# Patient Record
Sex: FEMALE | Race: WHITE | NOT HISPANIC OR LATINO | Employment: OTHER | URBAN - METROPOLITAN AREA
[De-identification: names, ages, dates, MRNs, and addresses within clinical notes are randomized per-mention and may not be internally consistent; named-entity substitution may affect disease eponyms.]

---

## 2017-01-31 ENCOUNTER — ALLSCRIPTS OFFICE VISIT (OUTPATIENT)
Dept: OTHER | Facility: OTHER | Age: 67
End: 2017-01-31

## 2017-01-31 ENCOUNTER — LAB REQUISITION (OUTPATIENT)
Dept: LAB | Facility: HOSPITAL | Age: 67
End: 2017-01-31
Payer: COMMERCIAL

## 2017-01-31 ENCOUNTER — GENERIC CONVERSION - ENCOUNTER (OUTPATIENT)
Dept: OTHER | Facility: OTHER | Age: 67
End: 2017-01-31

## 2017-01-31 ENCOUNTER — TRANSCRIBE ORDERS (OUTPATIENT)
Dept: ADMINISTRATIVE | Facility: HOSPITAL | Age: 67
End: 2017-01-31

## 2017-01-31 DIAGNOSIS — M25.512 PAIN IN LEFT SHOULDER: ICD-10-CM

## 2017-01-31 DIAGNOSIS — R31.29 OTHER MICROSCOPIC HEMATURIA: ICD-10-CM

## 2017-01-31 DIAGNOSIS — R10.32 ABDOMINAL PAIN, LEFT LOWER QUADRANT: Primary | ICD-10-CM

## 2017-01-31 DIAGNOSIS — R10.32 LEFT LOWER QUADRANT PAIN: ICD-10-CM

## 2017-01-31 DIAGNOSIS — Z12.31 ENCOUNTER FOR SCREENING MAMMOGRAM FOR MALIGNANT NEOPLASM OF BREAST: ICD-10-CM

## 2017-01-31 DIAGNOSIS — R31.29 MICROSCOPIC HEMATURIA: ICD-10-CM

## 2017-01-31 DIAGNOSIS — R63.5 ABNORMAL WEIGHT GAIN: ICD-10-CM

## 2017-01-31 DIAGNOSIS — E04.1 NONTOXIC SINGLE THYROID NODULE: ICD-10-CM

## 2017-01-31 DIAGNOSIS — M75.82 OTHER SHOULDER LESIONS, LEFT SHOULDER: ICD-10-CM

## 2017-01-31 DIAGNOSIS — Z12.11 ENCOUNTER FOR SCREENING FOR MALIGNANT NEOPLASM OF COLON: ICD-10-CM

## 2017-01-31 DIAGNOSIS — Z78.0 ASYMPTOMATIC MENOPAUSAL STATE: ICD-10-CM

## 2017-01-31 LAB
BILIRUB UR QL STRIP: NEGATIVE
CLARITY UR: NORMAL
COLOR UR: CLEAR
GLUCOSE (HISTORICAL): NEGATIVE
HGB UR QL STRIP.AUTO: NORMAL
KETONES UR STRIP-MCNC: NEGATIVE MG/DL
LEUKOCYTE ESTERASE UR QL STRIP: NEGATIVE
NITRITE UR QL STRIP: NEGATIVE
PH UR STRIP.AUTO: 5 [PH]
PROT UR STRIP-MCNC: NEGATIVE MG/DL
SP GR UR STRIP.AUTO: 1
UROBILINOGEN UR QL STRIP.AUTO: 0.2

## 2017-01-31 PROCEDURE — 87086 URINE CULTURE/COLONY COUNT: CPT | Performed by: PHYSICIAN ASSISTANT

## 2017-02-01 LAB — BACTERIA UR CULT: NORMAL

## 2017-02-03 ENCOUNTER — GENERIC CONVERSION - ENCOUNTER (OUTPATIENT)
Dept: OTHER | Facility: OTHER | Age: 67
End: 2017-02-03

## 2017-02-07 ENCOUNTER — HOSPITAL ENCOUNTER (OUTPATIENT)
Dept: CT IMAGING | Facility: HOSPITAL | Age: 67
Discharge: HOME | End: 2017-02-07
Payer: COMMERCIAL

## 2017-02-07 DIAGNOSIS — R31.29 OTHER MICROSCOPIC HEMATURIA: ICD-10-CM

## 2017-02-07 DIAGNOSIS — R10.32 LEFT LOWER QUADRANT PAIN: ICD-10-CM

## 2017-02-07 PROCEDURE — 74178 CT ABD&PLV WO CNTR FLWD CNTR: CPT

## 2017-02-07 RX ADMIN — IOHEXOL 100 ML: 350 INJECTION, SOLUTION INTRAVENOUS at 17:03

## 2017-02-09 ENCOUNTER — APPOINTMENT (OUTPATIENT)
Dept: PHYSICAL THERAPY | Facility: MEDICAL CENTER | Age: 67
End: 2017-02-09
Payer: COMMERCIAL

## 2017-02-10 ENCOUNTER — GENERIC CONVERSION - ENCOUNTER (OUTPATIENT)
Dept: OTHER | Facility: OTHER | Age: 67
End: 2017-02-10

## 2017-02-15 ENCOUNTER — APPOINTMENT (OUTPATIENT)
Dept: PHYSICAL THERAPY | Facility: MEDICAL CENTER | Age: 67
End: 2017-02-15
Payer: COMMERCIAL

## 2017-02-15 ENCOUNTER — GENERIC CONVERSION - ENCOUNTER (OUTPATIENT)
Dept: OTHER | Facility: OTHER | Age: 67
End: 2017-02-15

## 2017-02-15 PROCEDURE — G8991 OTHER PT/OT GOAL STATUS: HCPCS

## 2017-02-15 PROCEDURE — 97161 PT EVAL LOW COMPLEX 20 MIN: CPT

## 2017-02-15 PROCEDURE — G8990 OTHER PT/OT CURRENT STATUS: HCPCS

## 2017-02-21 ENCOUNTER — APPOINTMENT (OUTPATIENT)
Dept: PHYSICAL THERAPY | Facility: MEDICAL CENTER | Age: 67
End: 2017-02-21
Payer: COMMERCIAL

## 2017-02-21 PROCEDURE — 97110 THERAPEUTIC EXERCISES: CPT

## 2017-02-21 PROCEDURE — 97140 MANUAL THERAPY 1/> REGIONS: CPT

## 2017-02-24 ENCOUNTER — APPOINTMENT (OUTPATIENT)
Dept: PHYSICAL THERAPY | Facility: MEDICAL CENTER | Age: 67
End: 2017-02-24
Payer: COMMERCIAL

## 2017-02-27 ENCOUNTER — APPOINTMENT (OUTPATIENT)
Dept: PHYSICAL THERAPY | Facility: MEDICAL CENTER | Age: 67
End: 2017-02-27
Payer: COMMERCIAL

## 2017-02-28 ENCOUNTER — ALLSCRIPTS OFFICE VISIT (OUTPATIENT)
Dept: OTHER | Facility: OTHER | Age: 67
End: 2017-02-28

## 2017-03-01 ENCOUNTER — LAB CONVERSION - ENCOUNTER (OUTPATIENT)
Dept: OTHER | Facility: OTHER | Age: 67
End: 2017-03-01

## 2017-03-01 ENCOUNTER — HOSPITAL ENCOUNTER (OUTPATIENT)
Dept: BONE DENSITY | Facility: MEDICAL CENTER | Age: 67
Discharge: HOME | End: 2017-03-01
Payer: COMMERCIAL

## 2017-03-01 DIAGNOSIS — Z78.0 ASYMPTOMATIC MENOPAUSAL STATE: ICD-10-CM

## 2017-03-01 LAB — TSH SERPL DL<=0.05 MIU/L-ACNC: 1.36 MIU/L (ref 0.4–4.5)

## 2017-03-01 PROCEDURE — 77080 DXA BONE DENSITY AXIAL: CPT

## 2017-03-02 ENCOUNTER — GENERIC CONVERSION - ENCOUNTER (OUTPATIENT)
Dept: OTHER | Facility: OTHER | Age: 67
End: 2017-03-02

## 2017-03-02 ENCOUNTER — HOSPITAL ENCOUNTER (OUTPATIENT)
Dept: MAMMOGRAPHY | Facility: MEDICAL CENTER | Age: 67
Discharge: HOME | End: 2017-03-02
Payer: COMMERCIAL

## 2017-03-02 ENCOUNTER — APPOINTMENT (OUTPATIENT)
Dept: PHYSICAL THERAPY | Facility: MEDICAL CENTER | Age: 67
End: 2017-03-02
Payer: COMMERCIAL

## 2017-03-02 ENCOUNTER — APPOINTMENT (OUTPATIENT)
Dept: ULTRASOUND IMAGING | Facility: MEDICAL CENTER | Age: 67
End: 2017-03-02
Payer: COMMERCIAL

## 2017-03-02 DIAGNOSIS — Z12.31 ENCOUNTER FOR SCREENING MAMMOGRAM FOR MALIGNANT NEOPLASM OF BREAST: ICD-10-CM

## 2017-03-02 PROCEDURE — G0202 SCR MAMMO BI INCL CAD: HCPCS

## 2017-03-03 ENCOUNTER — GENERIC CONVERSION - ENCOUNTER (OUTPATIENT)
Dept: OTHER | Facility: OTHER | Age: 67
End: 2017-03-03

## 2017-03-03 ENCOUNTER — HOSPITAL ENCOUNTER (OUTPATIENT)
Dept: ULTRASOUND IMAGING | Facility: MEDICAL CENTER | Age: 67
Discharge: HOME | End: 2017-03-03
Payer: COMMERCIAL

## 2017-03-03 DIAGNOSIS — E04.1 NONTOXIC SINGLE THYROID NODULE: ICD-10-CM

## 2017-03-03 DIAGNOSIS — M81.0 AGE-RELATED OSTEOPOROSIS WITHOUT CURRENT PATHOLOGICAL FRACTURE: ICD-10-CM

## 2017-03-03 PROCEDURE — 76536 US EXAM OF HEAD AND NECK: CPT

## 2017-03-06 ENCOUNTER — GENERIC CONVERSION - ENCOUNTER (OUTPATIENT)
Dept: OTHER | Facility: OTHER | Age: 67
End: 2017-03-06

## 2017-03-07 ENCOUNTER — LAB CONVERSION - ENCOUNTER (OUTPATIENT)
Dept: OTHER | Facility: OTHER | Age: 67
End: 2017-03-07

## 2017-03-07 ENCOUNTER — GENERIC CONVERSION - ENCOUNTER (OUTPATIENT)
Dept: OTHER | Facility: OTHER | Age: 67
End: 2017-03-07

## 2017-03-07 LAB
25(OH)D3 SERPL-MCNC: 33 NG/ML (ref 30–100)
A/G RATIO (HISTORICAL): 1.5 (CALC) (ref 1–2.5)
ALBUMIN SERPL BCP-MCNC: 4 G/DL (ref 3.6–5.1)
ALP SERPL-CCNC: 89 U/L (ref 33–130)
ALT SERPL W P-5'-P-CCNC: 11 U/L (ref 6–29)
AST SERPL W P-5'-P-CCNC: 11 U/L (ref 10–35)
BILIRUB SERPL-MCNC: 0.3 MG/DL (ref 0.2–1.2)
BUN SERPL-MCNC: 16 MG/DL (ref 7–25)
BUN/CREA RATIO (HISTORICAL): NORMAL (CALC) (ref 6–22)
CALCIUM SERPL-MCNC: 9 MG/DL (ref 8.6–10.4)
CALCIUM SERPL-MCNC: 9 MG/DL (ref 8.6–10.4)
CHLORIDE SERPL-SCNC: 105 MMOL/L (ref 98–110)
CO2 SERPL-SCNC: 27 MMOL/L (ref 20–31)
CREAT SERPL-MCNC: 0.57 MG/DL (ref 0.5–0.99)
EGFR AFRICAN AMERICAN (HISTORICAL): 112 ML/MIN/1.73M2
EGFR-AMERICAN CALC (HISTORICAL): 97 ML/MIN/1.73M2
GAMMA GLOBULIN (HISTORICAL): 2.6 G/DL (CALC) (ref 1.9–3.7)
GLUCOSE (HISTORICAL): 98 MG/DL (ref 65–99)
POTASSIUM SERPL-SCNC: 4.9 MMOL/L (ref 3.5–5.3)
PTH-INTACT SERPL-MCNC: 60 PG/ML (ref 14–64)
SODIUM SERPL-SCNC: 137 MMOL/L (ref 135–146)
TOTAL PROTEIN (HISTORICAL): 6.6 G/DL (ref 6.1–8.1)

## 2017-03-09 ENCOUNTER — APPOINTMENT (OUTPATIENT)
Dept: PHYSICAL THERAPY | Facility: MEDICAL CENTER | Age: 67
End: 2017-03-09
Payer: COMMERCIAL

## 2017-03-09 PROCEDURE — 97110 THERAPEUTIC EXERCISES: CPT

## 2017-03-09 PROCEDURE — 97140 MANUAL THERAPY 1/> REGIONS: CPT

## 2017-03-16 ENCOUNTER — APPOINTMENT (OUTPATIENT)
Dept: PHYSICAL THERAPY | Facility: MEDICAL CENTER | Age: 67
End: 2017-03-16
Payer: COMMERCIAL

## 2017-03-16 PROCEDURE — 97110 THERAPEUTIC EXERCISES: CPT

## 2018-01-10 NOTE — RESULT NOTES
Verified Results  (Q) QUANTIFERON(R)-TB [facility] INCUBATED) [id number] 08:13AM Irena Newman   REPORT COMMENT:  SPLIT 08/30/2016 FROM [id number]  FASTING:YES     Test Name Result Flag Reference   QUANTIFERON(R)-TB GOLD,$(INCUBATED) NEGATIVE  NEGATIVE   Negative test result  M  tuberculosis complex   infection unlikely  NIL 0 05 IU/mL     MITOGEN-NIL 9 63 IU/mL     TB-NIL <0 00 IU/mL     The Nil tube value is used to determine if the patient  has a preexisting immune response which could cause a   false-positive reading on the test  In order for a   test to be valid, the Nil tube must have a value of   less than or equal to 8 0 IU/mL  The mitogen control tube is used to assure the patient   has a healthy immune status and also serves as a   control for correct blood handling and incubation  It   is used to detect false-negative readings  The mitogen   tube must have a gamma interferon value of greater   than or equal to 0 5 IU/mL higher than the value of   the Nil tube  The TB antigen tube is coated with the M  tuberculosis  specific antigens  For a test to be considered   positive, the TB antigen tube value minus the Nil tube   value must be greater than or equal to 0 35 IU/mL  For additional information, please refer to   http://education  [employer]/faq/QFT  (This link is being provided for informational/  educational purposes only )

## 2018-01-10 NOTE — RESULT NOTES
Verified Results  CT RENAL PROTOCOL [id number] 04:32PM William Peres   TW Order Number: [id number]    - Patient Instructions: To schedule this appointment, please contact Central Scheduling at [phone]  Test Name Result Flag Reference   CT RENAL PROTOCOL (Report)     CT ABDOMEN AND PELVIS WITH AND WITHOUT IV CONTRAST     INDICATION: Left lower quadrant pain and microhematuria  COMPARISON: November 3, 2015     TECHNIQUE: CT of the kidneys was performed without intravenous contrast  Dynamic postcontrast CT evaluation of the abdomen and pelvis was performed in both nephrographic and delayed phases after the administration of intravenous contrast  This    examination, like all CT scans performed in the [facility], was performed utilizing techniques to minimize radiation dose exposure, including the use of iterative reconstruction and automated exposure control  Axial, sagittal and    coronal reformatted images were submitted for interpretation  IV Contrast: iohexol (OMNIPAQUE) 350 MG/ML injection (MULTI-DOSE) 100 mL Note: (SINGLE DOSE/MULTI DOSE) information refers to the container from which the contrast was acquired  Contrast was injected one time intravenously without immediate    complication  Enteric Contrast: Enteric contrast was not administered  FINDINGS:     ABDOMEN     RIGHT KIDNEY AND URETER:   6 mm fat density mass triangular-shaped in the posterior midpole the right kidney is stable from prior studies likely angiomyolipoma    No detectable ureteral mass  No hydronephrosis or hydroureter  No urinary tract calculi  No perinephric collection  LEFT KIDNEY AND URETER:   No solid renal mass  No detectable ureteral mass  No hydronephrosis or hydroureter  No urinary tract calculi  No perinephric collection  URINARY BLADDER:   No bladder wall mass  No calculi  LUNG BASES: Unremarkable  LIVER/BILIARY TREE: Unremarkable       GALLBLADDER: No calcified gallstones  No pericholecystic inflammatory change  SPLEEN: Unremarkable  PANCREAS: Unremarkable  ADRENAL GLANDS: Unremarkable  STOMACH, BOWEL AND APPENDIX: Unremarkable  ABDOMINOPELVIC CAVITY: No ascites  No free intraperitoneal air  No lymphadenopathy  VESSELS: Unremarkable for patient's age  PELVIS     REPRODUCTIVE ORGANS: Unremarkable for patient's age  ABDOMINAL WALL/INGUINAL REGIONS: Unremarkable  OSSEOUS STRUCTURES: No acute fracture or destructive osseous lesion  IMPRESSION:     Normal examination  No CT abnormality identified to account for hematuria  6 mm angiomyolipoma posterior midpole cortex of the right kidney  This is stable dating back to November 20, 2010         Workstation performed: [id number]     Signed by:   Cathlean Primrose, DO   2/9/17

## 2018-01-11 NOTE — RESULT NOTES
Verified Results  (1) COMPREHENSIVE METABOLIC PANEL [id number] 00:36LF Tiana Aleksandar     Test Name Result Flag Reference   GLUCOSE 98 mg/dL  65-99   Fasting reference interval   UREA NITROGEN (BUN) 16 mg/dL  7-25   CREATININE 0 57 mg/dL  0 50-0 99   For patients >52years of age, the reference limit  for Creatinine is approximately 13% higher for people  identified as -American  eGFR NON-AFR   AMERICAN 97 mL/min/1 73m2  > OR = 60   eGFR AFRICAN AMERICAN 112 mL/min/1 73m2  > OR = 60   BUN/CREATININE RATIO   3-68   NOT APPLICABLE (calc)   SODIUM 137 mmol/L  135-146   POTASSIUM 4 9 mmol/L  3 5-5 3   CHLORIDE 105 mmol/L     CARBON DIOXIDE 27 mmol/L  20-31   CALCIUM 9 0 mg/dL  8 6-10 4   PROTEIN, TOTAL 6 6 g/dL  6 1-8 1   ALBUMIN 4 0 g/dL  3 6-5 1   GLOBULIN 2 6 g/dL (calc)  1 9-3 7   ALBUMIN/GLOBULIN RATIO 1 5 (calc)  1 0-2 5   BILIRUBIN, TOTAL 0 3 mg/dL  0 2-1 2   ALKALINE PHOSPHATASE 89 U/L     AST 11 U/L  10-35   ALT 11 U/L  6-29     (Q) PTH, INTACT AND CALCIUM [id number] 10:49AM Tiana Huertas     Test Name Result Flag Reference   PARATHYROID HORMONE,$INTACT 60 pg/mL  14-64   Interpretive Guide    Intact PTH           Calcium  ------------------    ----------           -------  Normal Parathyroid    Normal               Normal  Hypoparathyroidism    Low or Low Normal    Low  Hyperparathyroidism     Primary            Normal or High       High     Secondary          High                 Normal or Low     Tertiary           High                 High  Non-Parathyroid     Hypercalcemia      Low or Low Normal    High   CALCIUM 9 0 mg/dL  8 6-10 4     *(Q) VITAMIN D, 25-HYDROXY, LC/MS/MS [id number] 10:49AM Tiana Huertas   REPORT COMMENT:  FASTING:NO     Test Name Result Flag Reference   VITAMIN D, 25-OH, TOTAL 33 ng/mL     Vitamin D Status         25-OH Vitamin D:     Deficiency:                    <20 ng/mL  Insufficiency:             20 - 29 ng/mL  Optimal:                 > or = 30 ng/mL For 25-OH Vitamin D testing on patients on   D2-supplementation and patients for whom quantitation   of D2 and D3 fractions is required, the QuestAssureD(TM)  25-OH VIT D, (D2,D3), LC/MS/MS is recommended: order   code 26209 (patients >2yrs)  For more information on this test, go to:  http://education  [employer]/faq/YOK559  (This link is being provided for   informational/educational purposes only )

## 2018-01-12 VITALS
WEIGHT: 139.38 LBS | HEIGHT: 64 IN | BODY MASS INDEX: 23.79 KG/M2 | TEMPERATURE: 98 F | HEART RATE: 80 BPM | DIASTOLIC BLOOD PRESSURE: 78 MMHG | SYSTOLIC BLOOD PRESSURE: 134 MMHG

## 2018-01-12 NOTE — RESULT NOTES
Verified Results  * MAMMO SCREENING BILATERAL W CAD [id number] 05:49PM Sherrell Harp Order Number: [id number]    - Patient Instructions: To schedule this appointment, please contact Central Scheduling at [phone]  Do not wear any perfume, powder, lotion or deodorant on breast or underarm area  Please bring your doctors order, referral (if needed) and insurance information with you on the day of the test  Failure to bring this information may result in this test being rescheduled  Arrive 15 minutes prior to your appointment time to register  On the day of your test, please bring any prior mammogram or breast studies with you that were not performed at a [facility]  Failure to bring prior exams may result in your test needing to be rescheduled  Test Name Result Flag Reference   MAMMO SCREENING BILATERAL W CAD (Report)     Patient History:   Patient is postmenopausal    Family history of unknown cancer in unspecified relative  No Hormone Replacement Therapy   Patient has never smoked  Patient's BMI is 24 2  Reason for exam: screening, asymptomatic  Mammo Screening Bilateral W CAD: March 2, 2017 - Check In #:    [de-identified]   Bilateral CC and MLO view(s) were taken  Technologist: [place], RT(R)(M)   Prior study comparison: June 19, 2012, bilateral digital    screening mammogram performed at [place] [address]  2010, digital bilateral screening mammogram,    performed at [facility]  2002, bilateral screening    mammogram, performed at [facility]  There are scattered fibroglandular densities  The parenchymal pattern appears stable  No dominant soft tissue    mass or suspicious calcifications are noted  The skin and nipple   contours are within normal limits  No mammographic evidence of malignancy  No    significant changes when compared with prior studies       ACR BI-RADSï¾® Assessments: BiRad:1 - Negative Recommendation:   Routine screening mammogram in 1 year  A reminder letter will be   scheduled  Analyzed by CAD     8-10% of cancers will be missed on mammography  Management of a    palpable abnormality must be based on clinical grounds  Patients   will be notified of their results via letter from our facility  Accredited by [employer] of [employer] and FDA  Transcription Location: [facility] [address]: [id number]     Risk Value(s):   Tyrer-Cuzick 10 Year: 3 100%, Tyrer-Cuzick Lifetime: 6 300%,    Myriad Table: 1 5%, NAI 5 Year: 2 0%, NCI Lifetime: 7 3%   Signed by:   Desiree Haro MD   3/3/17     * DXA BONE DENSITY SPINE HIP AND PELVIS [id number] 07:51AM Heidi Valles    Order Number: [id number]    - Patient Instructions: To schedule this appointment, please contact Central Scheduling at [phone]  Test Name Result Flag Reference   DXA BONE DENSITY SPINE HIP AND PELVIS (Report)     CENTRAL DXA SCAN     CLINICAL HISTORY:  77year old post-menopausal  female risk factors include estrogen deficiency  TECHNIQUE: Bone densitometry was performed using a Droplr's W bone densitometer  Regions of interest appear properly placed  There are no obvious fractures or other confounding variables which could limit the study  None     COMPARISON: Baseline     RESULTS:    LUMBAR SPINE: L1-L4:   BMD 0 717 gm/cm2   T-score below normal, -3 0, serious osteoporosis  Z-score -1 1     LEFT TOTAL HIP:   BMD 0 761 gm/cm2   T-score below normal, -1 6   Z-score -0 1     LEFT FEMORAL NECK:   BMD 0 664 gm/cm2   T-score below normal, -1 7   Z-score 0 0     A 25-hydroxy vitamin D level, an intact PTH, and a comprehensive metabolic panel are suggested in view of the study results  A classic postmenopausal osteoporosis pattern is noted in this patient  Treatment is advised perhaps with oral or intravenous Bisphosphonates  IMPRESSION:   1   Based on the [facility] classification, this study identifies serious osteoporosis at the spine area with moderate cortical osteopenia and the patient is considered at elevated risk for fracture  2  A daily intake of calcium of at least 1200 mg and vitamin D, 800-1000 IU, as well as weight bearing and muscle strengthening exercise, fall prevention and avoidance of tobacco and excessive alcohol intake as basic preventive measures are recommended  3  Repeat DXA scan on the same equipment in 18-24 months as clinically indicated  The 10 year risk of hip fracture is 1 2%, with the 10 year risk of major osteoporotic fracture being 9 7%, as calculated by the [facility] fracture risk assessment tool (FRAX)  The current NOF guidelines recommend treating patients with FRAX 10 year risk score   of >3% for hip fracture and >20% for major osteoporotic fracture  WHO CLASSIFICATION:   Normal (a T-score of -1 0 or higher)   Low bone mineral density (a T-score of less than -1 0 but higher than -2 5)   Osteoporosis (a T-score of -2 5 or less)   Severe osteoporosis (a T-score of -2 5 or less with a fragility fracture)     Thank you for allowing us the opportunity to participate in your patient care  The expanded DEXA report will no longer be arriving in your mail  If you desire to view the full report please contact [facility] or access the PACS system          Workstation performed: [id number]     Signed by:   Sarah Chavarria MD   3/3/17

## 2018-01-13 VITALS
HEART RATE: 88 BPM | HEIGHT: 62 IN | SYSTOLIC BLOOD PRESSURE: 118 MMHG | BODY MASS INDEX: 25.99 KG/M2 | DIASTOLIC BLOOD PRESSURE: 62 MMHG | WEIGHT: 141.25 LBS | TEMPERATURE: 98.5 F

## 2018-01-13 NOTE — RESULT NOTES
Verified Results  [address] [place] [address] [id number] 01:32PM Aylin Wood   TW Order Number: [id number]    - Patient Instructions: To schedule this appointment, please contact Central Scheduling at [phone]  Test Name Result Flag Reference   US THYROID (Report)     THYROID ULTRASOUND     INDICATION: Follow-up thyroid nodules  COMPARISON: December 8, 2015     TECHNIQUE:  Ultrasound of the thyroid was performed with a high frequency linear transducer in transverse and sagittal planes including volumetric imaging sweeps as well as traditional still imaging technique  FINDINGS:     RIGHT GLAND: Enlarged measuring 4 5 x 2 0 x 2 2 cm  Volume = 9 3 mL, previously 5 3 mL  Parenchymal echotexture is diffusely heterogeneous  Lower pole heterogeneous hypoechoic nodule measuring 1 2 x 0 9 x 0 8 cm, previously 1 2 x 1 0 x 0 7 cm  VERY LOW SUSPICION PATTERN  Right lower pole nodule was biopsied in 2013, reportedly benign  LEFT GLAND: Enlarged measuring 4 5 x 1 7 x 1 7 cm  Volume = 6 mL, previously 5 mL  Parenchymal echotexture is diffusely heterogeneous  Lower interpolar heterogeneous hypoechoic nodule with hypoechoic rim measuring 0 9 x 0 6 x 0 5 cm, previously 0 8 x 0 5 x 0 5 cm  INTERMEDIATE SUSPICION PATTERN  ISTHMUS: The isthmus is 0 5 cm in AP dimension  No discrete nodules  IMPRESSION:   Diffusely enlarged heterogeneous thyroid gland, increasing from prior exam      Bilateral thyroid nodules  Right lower pole nodule has a very low suspicion pattern by GLENN criteria , has been stable in size, and was biopsied in 2013  The left lower interpolar nodule has an intermediate suspicion pattern by GLENN criteria and measures very slightly larger (which is not unexpected even for a benign nodule), but does not yet meet criteria for biopsy  Follow-up recommended in 12 months             GLENN GUIDELINES PROVIDED FOR REFERENCE:   INTERMEDIATE SUSPICION PATTERN (estimated risk of malignancy 10-20%): Solid hypoechoic nodule with smooth margins, without microcalcifications, extrathyroid extension, or shape taller than wide  BIOSPY AT >/= 1 cm  VERY LOW SUSPICION PATTERN (estimated risk of malignancy <3%): Spongiform or partially cystic nodule without any of the sonographic features described in low, intermediate, or high suspicion patterns  CONSIDER BIOPSY at >/= 2 cm, or OBSERVATION  Reference: 2015 American Thyroid Association Management Guidelines for Adult Patients with Thyroid Nodules and Differentiated Thyroid Cancer  Thyroid, Volume 26, Number 1, 2016  ##[id number]##[id number]          Workstation performed: [id number]     Signed by:    Davon Mulligan DO   3/5/17

## 2018-01-13 NOTE — RESULT NOTES
Verified Results  (1) URINE CULTURE 31Jan2017 11:55AM Chantal Sharp   TW Order Number: [id number]     Test Name Result Flag Reference   CLINICAL REPORT (Report)     Test:        Urine culture  Specimen Type:   Urine  Specimen Date:   1/31/2017 11:55 AM  Result Date:    2/1/2017 12:29 PM  Result Status:   Final result  Resulting Lab:   [address] [place] [address]            Tel: [phone]      CULTURE                                       ------------------                                   <10,000 cfu/ml Gram Negative Cocci

## 2018-01-15 NOTE — RESULT NOTES
Verified Results  Urine Dip Non-Automated- POC [id number] 11:55AM Mei Zapata     Test Name Result Flag Reference   Color Clear     Clarity Transparent     Leukocytes Negative     Nitrite Negative     Blood Moderate     Bilirubin Negative     Urobilinogen 0 2     Protein Negative     Ph 5 0     Specific Gravity 1 000     Ketone Negative     Glucose Negative

## 2018-01-15 NOTE — RESULT NOTES
Verified Results  (Q) TSH, 3RD GENERATION W/REFLEX TO FT4 [id number] 02:20PM Megan Cisneros   REPORT COMMENT:  FASTING:NO     Test Name Result Flag Reference   TSH W/REFLEX TO FT4 1 36 mIU/L  0 40-4 50

## 2018-01-18 NOTE — RESULT NOTES
Verified Results  (1) COMPREHENSIVE METABOLIC PANEL [id number] 98:77OH McGjv Quijano     Test Name Result Flag Reference   GLUCOSE 92 mg/dL  65-99   Fasting reference interval   UREA NITROGEN (BUN) 15 mg/dL  7-25   CREATININE 0 61 mg/dL  0 50-0 99   For patients >52years of age, the reference limit  for Creatinine is approximately 13% higher for people  identified as -American  eGFR NON-AFR  AMERICAN 94 mL/min/1 73m2  > OR = 60   eGFR AFRICAN AMERICAN 109 mL/min/1 73m2  > OR = 60   BUN/CREATININE RATIO   5-60   NOT APPLICABLE (calc)   ALT 15 U/L  6-29   ALBUMIN 4 1 g/dL  3 6-5 1   GLOBULIN 2 5 g/dL (calc)  1 9-3 7   ALBUMIN/GLOBULIN RATIO 1 6 (calc)  1 0-2 5   BILIRUBIN, TOTAL 0 4 mg/dL  0 2-1 2   ALKALINE PHOSPHATASE 74 U/L     AST 15 U/L  10-35   SODIUM 138 mmol/L  135-146   POTASSIUM 4 3 mmol/L  3 5-5 3   CHLORIDE 103 mmol/L     CARBON DIOXIDE 30 mmol/L  20-31   CALCIUM 8 9 mg/dL  8 6-10 4   PROTEIN, TOTAL 6 6 g/dL  6 1-8 1     (1) LIPID PANEL, FASTING [id number] 08:10AM Johann Tao     Test Name Result Flag Reference   CHOLESTEROL, TOTAL 188 mg/dL  125-200   HDL CHOLESTEROL 60 mg/dL  > OR = 46   TRIGLICERIDES 79 mg/dL  <469   LDL-CHOLESTEROL 112 mg/dL (calc)  <130   Desirable range <100 mg/dL for patients with CHD or  diabetes and <70 mg/dL for diabetic patients with  known heart disease  CHOL/HDLC RATIO 3 1 (calc)  < OR = 5 0   NON HDL CHOLESTEROL 128 mg/dL (calc)     Target for non-HDL cholesterol is 30 mg/dL higher than   LDL cholesterol target       (1) CBC/PLT/DIFF [id number] 08:10AM Iglesia Quijano     Test Name Result Flag Reference   WHITE BLOOD CELL COUNT 5 5 Thousand/uL  3 8-10 8   RED BLOOD CELL COUNT 4 68 Million/uL  3 80-5 10   HEMOGLOBIN 13 9 g/dL  11 7-15 5   HEMATOCRIT 41 6 %  35 0-45 0   MCV 88 9 fL  80 0-100 0   MCH 29 7 pg  27 0-33 0   EOSINOPHILS 2 0 %     BASOPHILS 0 6 %     ABSOLUTE MONOCYTES 424 cells/uL  200-950   ABSOLUTE EOSINOPHILS 110 cells/uL    ABSOLUTE BASOPHILS 33 cells/uL  0-200   NEUTROPHILS 36 9 %     LYMPHOCYTES 52 8 %     MONOCYTES 7 7 %     MCHC 33 4 g/dL  32 0-36 0   RDW 13 0 %  11 0-15 0   PLATELET COUNT 235 Thousand/uL  140-400   MPV 8 6 fL  7 5-11 5   ABSOLUTE NEUTROPHILS 2030 cells/uL  4115-2054   ABSOLUTE LYMPHOCYTES 2904 cells/uL  850-3900     (1) VITAMIN B12 [id number] 08:10AM Iglesia Barrett     Test Name Result Flag Reference   VITAMIN B12 249 pg/mL  200-1100   Please Note: Although the reference range for vitamin  B12 is 200-1100 pg/mL, it has been reported that between  5 and 10% of patients with values between 200 and 400  pg/mL may experience neuropsychiatric and hematologic  abnormalities due to occult B12 deficiency; less than 1%  of patients with values above 400 pg/mL will have symptoms  (1) IRON [id number] 08:10AM Tj Bocanegra     Test Name Result Flag Reference   IRON, TOTAL 100 mcg/dL       (1) OP TATO FERRITIN [id number] 08:10AM Iglesia Barrett     Test Name Result Flag Reference   FERRITIN 57 ng/mL       *(Q) VITAMIN D, 25-HYDROXY, LC/MS/MS [id number] 08:10AM Tj Bocanegra     Test Name Result Flag Reference   VITAMIN D, 25-OH, TOTAL 41 ng/mL     Vitamin D Status         25-OH Vitamin D:     Deficiency:                    <20 ng/mL  Insufficiency:             20 - 29 ng/mL  Optimal:                 > or = 30 ng/mL     For 25-OH Vitamin D testing on patients on   D2-supplementation and patients for whom quantitation   of D2 and D3 fractions is required, the QuestAssureD(TM)  25-OH VIT D, (D2,D3), LC/MS/MS is recommended: order   code 23428 (patients >2yrs)  For more information on this test, go to:  http://[employer]/faq/MFM416  (This link is being provided for   informational/educational purposes only )     (Q) TSH, 3RD GENERATION W/REFLEX TO FT4 [id number] 08:10AM Iglesia Barrett   REPORT COMMENT:  FASTING:YES  SPECIMEN BEING INCUBATED FOR TESTING, RESULTS TO FOLLOW       Test Name Result Flag Reference   TSH W/REFLEX TO FT4 1 49 mIU/L  0 40-4 50

## 2018-06-02 PROBLEM — R63.5 ABNORMAL WEIGHT GAIN: Status: ACTIVE | Noted: 2017-02-28

## 2018-06-02 PROBLEM — M81.0 OSTEOPOROSIS: Status: ACTIVE | Noted: 2017-03-03

## 2018-06-20 RX ORDER — UBIDECARENONE 50 MG
1 CAPSULE ORAL DAILY
COMMUNITY
End: 2019-02-19

## 2018-06-20 NOTE — PROGRESS NOTES
[place] [facility]  FAMILY PRACTICE OFFICE VISIT       NAME: Francisco Mary  AGE: 76 y o  SEX: female       : 1950        MRN: [id number]    DATE: 2018  TIME: 1:07 PM    Assessment and Plan     Problem List Items Addressed This Visit     Microscopic hematuria       The patient never followed up with Urology last year after having blood still in the urine upon repeat urinalysis  She did another urine dip today which shows that she does still have some blood in the urine  She was referred to Urology again today  Relevant Orders    Ambulatory referral to Urology    POCT urine dip (Completed)    Urine culture    Nontoxic single thyroid nodule       Given slip to recheck thyroid labs as well as thyroid ultrasound  Relevant Orders    Comprehensive metabolic panel    TSH, 3rd generation with Free T4 reflex    US thyroid    Osteoporosis      Patient had her last DEXA scan done in 2017  She was not interested in taking medication for her osteoporosis  She is currently only using vitamin-D 1000 daily  She reports not having very good dairy intake  She was encouraged to start using calcium 600 mg twice daily  We will plan on reviewing her DEXA scan next year to reassess her bone density  Vitamin B12 deficiency       Patient is currently using vitamin B12 1000 mcg daily  We will recheck with labs as ordered today  Relevant Orders    CBC and differential    Vitamin B12    Vitamin D deficiency       Currently taking 1000 units of vitamin-D daily  We will recheck with labs as ordered           Relevant Orders    CBC and differential    Vitamin D 25 hydroxy      Other Visit Diagnoses     Medicare annual wellness visit, subsequent    -  Primary    Relevant Orders    Ambulatory referral to Ophthalmology    Encounter for immunization        Relevant Orders    PNEUMOCOCCAL POLYSACCHARIDE VACCINE 23-VALENT =>3YO SQ IM (Pneumovax) (Completed)    Encounter for hepatitis C screening test for low risk patient        Relevant Orders    Hepatitis C antibody    Seborrheic keratosis        Relevant Orders    Ambulatory referral to Dermatology          Nontoxic single thyroid nodule    Given slip to recheck thyroid labs as well as thyroid ultrasound  Osteoporosis   Patient had her last DEXA scan done in March 2017  She was not interested in taking medication for her osteoporosis  She is currently only using vitamin-D 1000 daily  She reports not having very good dairy intake  She was encouraged to start using calcium 600 mg twice daily  We will plan on reviewing her DEXA scan next year to reassess her bone density  Microscopic hematuria    The patient never followed up with Urology last year after having blood still in the urine upon repeat urinalysis  She did another urine dip today which shows that she does still have some blood in the urine  She was referred to Urology again today  Vitamin B12 deficiency    Patient is currently using vitamin B12 1000 mcg daily  We will recheck with labs as ordered today  Vitamin D deficiency    Currently taking 1000 units of vitamin-D daily  We will recheck with labs as ordered  Chief Complaint     Chief Complaint   Patient presents with    Medicare Wellness Visit       History of Present Illness   Antonio Shaw is a 76y o -year-old female who presents for health maintenance visit  She notes that she had a blood in the urine last year  She forgot to see urology so wants to recheck now       She notes that she has a lesion on the right cheek - notes that it is itchy and getting a little bigger - would like it removed     Has a corn on the bottom of the feet - never saw podiatry but notes that when I have scraped it she is good for 1 year    She notes that she had a thyroid u/s last year that required a 1 year follow-up - denies sensing any growth of thyroid          Review of Systems   Review of Systems Constitutional: Negative for chills and fever  HENT: Negative for rhinorrhea and sore throat  Eyes: Positive for visual disturbance (thinks a little blurry)  Respiratory: Negative for cough, shortness of breath and wheezing  Cardiovascular: Negative for chest pain, palpitations and leg swelling  Gastrointestinal: Positive for nausea (in the morning for the last few years - not daily )  Negative for abdominal pain, constipation, diarrhea and vomiting  Endocrine: Negative for polydipsia and polyuria  Genitourinary: Positive for dysuria  Dyspareunia: occasionally with drinking something sweet    Musculoskeletal: Negative for arthralgias, back pain (not too much sciatica this year yet) and myalgias  Skin: Negative for rash  Notes lesion on right cheek   Neurological: Negative for dizziness, syncope and headaches  Hematological: Does not bruise/bleed easily  Psychiatric/Behavioral: Negative for dysphoric mood  The patient is not nervous/anxious          Active Problem List     Patient Active Problem List   Diagnosis    Fatigue    Microscopic hematuria    Nontoxic single thyroid nodule    Osteoporosis    Positive PPD    Sciatica    Simple goiter    Vitamin B12 deficiency    Vitamin D deficiency         Past Medical History:  Past Medical History:   Diagnosis Date    Anemia     last assessed: 5/27/2016    Anxiety     last assessed: 11/16/2015    Esophageal reflux     resolved: 2/28/2017    External hemorrhoids     last assessed: 11/20/2012    Migraine     resolved after went through menopause       Past Surgical History:  Past Surgical History:   Procedure Laterality Date    ESOPHAGOGASTRODUODENOSCOPY  2015    diagnostic; neg    FINE NEEDLE ASPIRATION  2013    thyroid nodule-negative for malignancy       Family History:  Family History   Problem Relation Age of Onset    Diabetes Mother         mellitus    Diabetes Father         mellitus    Arthritis Sister     Kidney disease Sister         kidneys are "shrinking" due to lots of protein    Thyroid disease Sister         disorder    Hypertension Sister        Social History:  Social History     Social History    Marital status: /Civil Union     Spouse name: N/A    Number of children: N/A    Years of education: N/A     Occupational History    Not on file  Social History Main Topics    Smoking status: Never Smoker    Smokeless tobacco: Never Used    Alcohol use No    Drug use: No    Sexual activity: Not on file     Other Topics Concern    Not on file     Social History Narrative    Denied: history of caffeine use       Objective     Vitals:    06/22/18 1005   BP: 110/70   Pulse: 72   Temp: 98 3 °F (36 8 °C)   SpO2: 97%     Wt Readings from Last 3 Encounters:   06/22/18 56 8 kg (125 lb 4 oz)   02/28/17 64 1 kg (141 lb 4 oz)   01/31/17 63 2 kg (139 lb 6 1 oz)       Physical Exam   Constitutional: She appears well-developed and well-nourished  HENT:   Head: Normocephalic and atraumatic  Right Ear: Hearing, tympanic membrane, external ear and ear canal normal    Left Ear: Hearing, tympanic membrane, external ear and ear canal normal    Nose: Nose normal    Mouth/Throat: Oropharynx is clear and moist and mucous membranes are normal    Eyes: Conjunctivae and lids are normal  Pupils are equal, round, and reactive to light  Neck: Trachea normal and normal range of motion  Neck supple  Carotid bruit is not present  Thyromegaly (borderline) present  No thyroid mass present  Cardiovascular: Normal rate, regular rhythm, S1 normal, S2 normal, normal heart sounds and intact distal pulses  No murmur heard  Pulses:       Radial pulses are 2+ on the right side, and 2+ on the left side  Posterior tibial pulses are 2+ on the right side, and 2+ on the left side  Pulmonary/Chest: Effort normal and breath sounds normal  She has no decreased breath sounds  She has no wheezes  She has no rhonchi   She has no rales    Abdominal: Soft  Normal appearance and bowel sounds are normal  She exhibits no distension and no mass  There is no hepatosplenomegaly  There is no tenderness  No hernia  Musculoskeletal: Normal range of motion  She exhibits no edema  Lymphadenopathy:     She has no cervical adenopathy  Neurological: She is alert  She has normal strength  No sensory deficit (light touch sensation intact and equal in UE and LE bilaterally)  Skin: Skin is warm and dry  No rash noted  Two-toned keratotic patch on the right cheek with a similar smaller lesion on the left cheek as noted   Psychiatric: She has a normal mood and affect   Her behavior is normal        Pertinent Laboratory/Diagnostic Studies:  Lab Results   Component Value Date    GLUCOSE 113 11/19/2015    BUN 16 03/06/2017    CREATININE 0 57 03/06/2017    CALCIUM 9 0 03/06/2017    CALCIUM 9 0 03/06/2017     03/06/2017    K 4 9 03/06/2017    CO2 27 03/06/2017     03/06/2017     Lab Results   Component Value Date    ALT 11 03/06/2017    AST 11 03/06/2017    ALKPHOS 89 03/06/2017    BILITOT 0 3 03/06/2017       Lab Results   Component Value Date    WBC 5 5 08/30/2016    HGB 13 9 08/30/2016    HCT 41 6 08/30/2016    MCV 88 9 08/30/2016     08/30/2016     Lab Results   Component Value Date    CHOL 188 08/30/2016     Lab Results   Component Value Date    TRIG 79 08/30/2016     Lab Results   Component Value Date    HDL 60 08/30/2016     Results for orders placed or performed in visit on 06/22/18   POCT urine dip   Result Value Ref Range    LEUKOCYTE ESTERASE,UA Negative      NITRITE,UA Negative     SL AMB POCT UROBILINOGEN 0 2     SL AMB POCT URINE PROTEIN Negative      PH,UA 5 0      BLOOD,UA Trace      SPECIFIC GRAVITY,UA 1 000      KETONES,UA Negative      BILIRUBIN,UA Negative     GLUCOSE, UA Negative      COLOR,UA Yellow      CLARITY,UA Clears        Orders Placed This Encounter   Procedures    Urine culture    US thyroid    PNEUMOCOCCAL POLYSACCHARIDE VACCINE 23-VALENT =>1YO SQ IM (Pneumovax)    CBC and differential    Comprehensive metabolic panel    TSH, 3rd generation with Free T4 reflex    Vitamin B12    Vitamin D 25 hydroxy    Hepatitis C antibody    Ambulatory referral to Urology    Ambulatory referral to Dermatology    Ambulatory referral to Ophthalmology    POCT urine dip       ALLERGIES:  No Known Allergies    Current Medications     Current Outpatient Prescriptions   Medication Sig Dispense Refill    Calcium Carbonate (CALCIUM 600 PO) Take 1 capsule by mouth 2 (two) times a day      cholecalciferol (VITAMIN D3) 1,000 units tablet Take 1 tablet by mouth daily      Red Yeast Rice 600 MG TABS Take 1 capsule by mouth daily      vitamin B-12 (CYANOCOBALAMIN) 100 MCG TABS Take 1,000 mcg by mouth daily       No current facility-administered medications for this visit            Health Maintenance     Health Maintenance   Topic Date Due    Hepatitis C Screening  1950    PAP SMEAR  1950    SLP PLAN OF CARE  1950    CRC Screening: Colonoscopy  1950    PNEUMOCOCCAL POLYSACCHARIDE VACCINE AGE 65 AND OVER  03/21/2015    GLAUCOMA SCREENING 67+ YR  03/21/2017    MAMMOGRAM  03/02/2018    INFLUENZA VACCINE  09/01/2018    DTaP,Tdap,and Td Vaccines (2 - Td) 05/12/2019    Fall Risk  06/22/2019    Depression Screening PHQ-9  06/22/2019    Urinary Incontinence Screening  06/22/2019     Immunization History   Administered Date(s) Administered    H1N1, All Formulations 10/29/2009    Hep A, adult 05/12/2009    Influenza Split High Dose Preservative Free IM 09/01/2016, 10/05/2017    Influenza TIV (IM) 12/19/2005, 11/08/2008, 10/11/2012, 09/26/2014, 09/16/2015    Pneumococcal Conjugate 13-Valent 02/28/2017    Pneumococcal Polysaccharide PPV23 06/22/2018    Tdap 05/12/2009    Tuberculin Skin Test-PPD Intradermal 04/01/1998       Tyree Vance PA-C  6/22/2018 1:07 PM  [place] [facility] [address] [facility]

## 2018-06-22 ENCOUNTER — OFFICE VISIT (OUTPATIENT)
Dept: FAMILY MEDICINE CLINIC | Facility: CLINIC | Age: 68
End: 2018-06-22
Payer: COMMERCIAL

## 2018-06-22 VITALS
DIASTOLIC BLOOD PRESSURE: 70 MMHG | OXYGEN SATURATION: 97 % | WEIGHT: 125.25 LBS | BODY MASS INDEX: 23.05 KG/M2 | HEART RATE: 72 BPM | TEMPERATURE: 98.3 F | HEIGHT: 62 IN | SYSTOLIC BLOOD PRESSURE: 110 MMHG

## 2018-06-22 DIAGNOSIS — L82.1 SEBORRHEIC KERATOSIS: ICD-10-CM

## 2018-06-22 DIAGNOSIS — E55.9 VITAMIN D DEFICIENCY: ICD-10-CM

## 2018-06-22 DIAGNOSIS — Z11.59 ENCOUNTER FOR HEPATITIS C SCREENING TEST FOR LOW RISK PATIENT: ICD-10-CM

## 2018-06-22 DIAGNOSIS — E53.8 VITAMIN B12 DEFICIENCY: ICD-10-CM

## 2018-06-22 DIAGNOSIS — Z00.00 MEDICARE ANNUAL WELLNESS VISIT, SUBSEQUENT: Primary | ICD-10-CM

## 2018-06-22 DIAGNOSIS — R31.29 MICROSCOPIC HEMATURIA: ICD-10-CM

## 2018-06-22 DIAGNOSIS — Z23 ENCOUNTER FOR IMMUNIZATION: ICD-10-CM

## 2018-06-22 DIAGNOSIS — M81.0 OSTEOPOROSIS, UNSPECIFIED OSTEOPOROSIS TYPE, UNSPECIFIED PATHOLOGICAL FRACTURE PRESENCE: ICD-10-CM

## 2018-06-22 DIAGNOSIS — E04.1 NONTOXIC SINGLE THYROID NODULE: ICD-10-CM

## 2018-06-22 PROBLEM — R63.5 ABNORMAL WEIGHT GAIN: Status: RESOLVED | Noted: 2017-02-28 | Resolved: 2018-06-22

## 2018-06-22 LAB
SL AMB  POCT GLUCOSE, UA: NEGATIVE
SL AMB LEUKOCYTE ESTERASE,UA: NEGATIVE
SL AMB POCT BILIRUBIN,UA: NEGATIVE
SL AMB POCT BLOOD,UA: ABNORMAL
SL AMB POCT CLARITY,UA: ABNORMAL
SL AMB POCT COLOR,UA: YELLOW
SL AMB POCT KETONES,UA: NEGATIVE
SL AMB POCT NITRITE,UA: NEGATIVE
SL AMB POCT PH,UA: 5
SL AMB POCT SPECIFIC GRAVITY,UA: 1
SL AMB POCT URINE PROTEIN: NEGATIVE
SL AMB POCT UROBILINOGEN: 0.2

## 2018-06-22 PROCEDURE — 3008F BODY MASS INDEX DOCD: CPT | Performed by: FAMILY MEDICINE

## 2018-06-22 PROCEDURE — 90732 PPSV23 VACC 2 YRS+ SUBQ/IM: CPT

## 2018-06-22 PROCEDURE — 1101F PT FALLS ASSESS-DOCD LE1/YR: CPT | Performed by: FAMILY MEDICINE

## 2018-06-22 PROCEDURE — G0439 PPPS, SUBSEQ VISIT: HCPCS | Performed by: FAMILY MEDICINE

## 2018-06-22 PROCEDURE — 87086 URINE CULTURE/COLONY COUNT: CPT | Performed by: PHYSICIAN ASSISTANT

## 2018-06-22 PROCEDURE — 99214 OFFICE O/P EST MOD 30 MIN: CPT | Performed by: FAMILY MEDICINE

## 2018-06-22 PROCEDURE — 81002 URINALYSIS NONAUTO W/O SCOPE: CPT | Performed by: FAMILY MEDICINE

## 2018-06-22 PROCEDURE — G0009 ADMIN PNEUMOCOCCAL VACCINE: HCPCS

## 2018-06-22 PROCEDURE — 3725F SCREEN DEPRESSION PERFORMED: CPT | Performed by: FAMILY MEDICINE

## 2018-06-22 RX ORDER — PYRIDOXINE HCL (VITAMIN B6) 50 MG
1000 TABLET ORAL DAILY
COMMUNITY
End: 2018-11-01

## 2018-06-22 NOTE — PATIENT INSTRUCTIONS
Nontoxic single thyroid nodule    Given slip to recheck thyroid labs as well as thyroid ultrasound  Osteoporosis   Patient had her last DEXA scan done in March 2017  She was not interested in taking medication for her osteoporosis  She is currently only using vitamin-D 1000 daily  She reports not having very good dairy intake  She was encouraged to start using calcium 600 mg twice daily  We will plan on reviewing her DEXA scan next year to reassess her bone density  Microscopic hematuria    The patient never followed up with Urology last year after having blood still in the urine upon repeat urinalysis  She did another urine dip today which shows that she does still have some blood in the urine  She was referred to Urology again today  Vitamin B12 deficiency    Patient is currently using vitamin B12 1000 mcg daily  We will recheck with labs as ordered today  Vitamin D deficiency    Currently taking 1000 units of vitamin-D daily  We will recheck with labs as ordered

## 2018-06-22 NOTE — PROGRESS NOTES
Assessment and Plan:    Problem List Items Addressed This Visit     None      Visit Diagnoses     Medicare annual wellness visit, subsequent        Encounter for immunization        Relevant Orders    PNEUMOCOCCAL POLYSACCHARIDE VACCINE 23-VALENT =>1YO SQ IM (Pneumovax)        Health Maintenance Due   Topic Date Due    Hepatitis C Screening  1950    PAP SMEAR  1950    SLP PLAN OF CARE  1950    CRC Screening: Colonoscopy  1950    PNEUMOCOCCAL POLYSACCHARIDE VACCINE AGE 72 AND OVER  03/21/2015    GLAUCOMA SCREENING 67+ YR  03/21/2017    MAMMOGRAM  03/02/2018         HPI:  Ruth Johansen is a 76 y o  female here for her Subsequent Wellness Visit      Patient Active Problem List   Diagnosis    Abnormal weight gain    Fatigue    Microscopic hematuria    Nontoxic single thyroid nodule    Osteoporosis    Positive PPD    Sciatica    Simple goiter    Vitamin B12 deficiency    Vitamin D deficiency     Past Medical History:   Diagnosis Date    Anemia     last assessed: 5/27/2016    Anxiety     last assessed: 11/16/2015    Esophageal reflux     resolved: 2/28/2017    External hemorrhoids     last assessed: 11/20/2012    Migraine     resolved after went through menopause     Past Surgical History:   Procedure Laterality Date    ESOPHAGOGASTRODUODENOSCOPY  2015    diagnostic; neg    FINE NEEDLE ASPIRATION  2013    thyroid nodule-negative for malignancy     Family History   Problem Relation Age of Onset    Diabetes Mother         mellitus    Diabetes Father         mellitus    Arthritis Sister     Kidney disease Sister         kidneys are "shrinking" due to lots of protein    Thyroid disease Sister         disorder    Hypertension Sister      History   Smoking Status    Never Smoker   Smokeless Tobacco    Never Used     History   Alcohol Use No      History   Drug Use No       Current Outpatient Prescriptions   Medication Sig Dispense Refill    Calcium Carbonate (CALCIUM 600 PO) Take 1 capsule by mouth 2 (two) times a day      cholecalciferol (VITAMIN D3) 1,000 units tablet Take 1 tablet by mouth daily      Red Yeast Rice 600 MG TABS Take 1 capsule by mouth daily      vitamin B-12 (CYANOCOBALAMIN) 100 MCG TABS Take 1,000 mcg by mouth daily       No current facility-administered medications for this visit  No Known Allergies  Immunization History   Administered Date(s) Administered    H1N1, All Formulations 10/29/2009    Hep A, adult 05/12/2009    Influenza Split High Dose Preservative Free IM 09/01/2016, 10/05/2017    Influenza TIV (IM) 12/19/2005, 11/08/2008, 10/11/2012, 09/26/2014, 09/16/2015    Pneumococcal Conjugate 13-Valent 02/28/2017    Tdap 05/12/2009    Tuberculin Skin Test-PPD Intradermal 04/01/1998       Patient Care Team:  Yancy Stovall MD as PCP - General  Diana Leggett MD      Medicare Screening Tests and Risk Assessments:  [facility]     ISAR:   Previous hospitalizations?:  No       Once in a Lifetime Medicare Screening:       Medicare Screening Tests and Risk Assessment:   AAA Risk Assessment    None Indicated:  Yes    Osteoporosis Risk Assessment    HIV Risk Assessment    None indicated:  Yes        Drug and Alcohol Use:   Tobacco use    Cigarettes:  never smoker    Tobacco use duration    Tobacco Cessation Readiness    Alcohol use    Alcohol use:  never    Alcohol Treatment Readiness   Illicit Drug Use    Drug use:  never        Diet & Exercise:   Diet   What is your diet?:  Regular   How many servings a day of the following:   Fruits and Vegetables:  1-2, 3-4 Meat:  1-2   Whole Grains:  1    Dairy:  1 Soda:  1   Coffee:  1 Tea:  1   Exercise    Do you currently exercise?:  yes    Frequency:  occasional    Minutes per day:  40    Type of exercise:  walking   Streching         Cognitive Impairment Screening:   Anxiety screenings preformed:   Yes Anxiety screen score:  0   Depression screening preformed:  Yes    Geriatric Depression scale score:  0    Depression screening results:  negative for symptoms   Cognitive Impairment Screening    Do you have difficulty learning or retaining new information?:  No Do you have difficulty handling new tasks?:  No   Do you have difficulty with reasoning?:  No Do you have difficulty with spatial ability and orientation?:  No   Do you have difficulty with language?:  No Do you have difficulty with behavior?:  No       Functional Ability/Level of Safety:   Hearing    Hearing difficulties:  No Bilateral:  normal   Left:  normal Right:  normal   Hearing aid:  No    Hearing Impairment Assessment    Hearing status:  No impairment   Current Activities    Help needed with the folllowing:    Using the phone:  No Transportation:  No   Shopping:  No Preparing Meals:  No   Doing Housework:  No Doing Laundry:  No    Managing Money:  No   ADL    Feeding:  Independant   Oral hygiene and Facial grooming:  Independant   Bathing:  Independant   Upper Body Dressing:  Independant   Lower Body Dressing:  Independant   Toileting:  Independant   Bed Mobility:  Independant   Fall Risk   Have you fallen in the last 12 months?:  No    Injury History       Home Safety:   Home Safety Risk Factors   Unfamilar with surroundings:  No Uneven floors:  No   Stairs or handrail saftey risk:  Yes Loose rugs:  No   Household clutter:  No Poor household lighting:  No   No grab bars in bathroom:  Yes Further evaluation needed:  No       Advanced Directives:   Advanced Directives    Living Will:  No Durable POA for healthcare:  No   Advanced directive:  No    Patient's End of Life Decisions        Urinary Incontinence:   Do you have urinary incontinence?:  No        Glaucoma:            Provider Screening     Preventative Screening/Counseling:   Cardiovascular Screening/Counseling:   (Labs Q5 years, EKG optional one-time)   General:  Screening Current           Diabetes Screening/Counseling:   (2 tests/year if Pre-Diabetes or 1 test/year if no Diabetes)     Due for: Labs:  Blood Glucose         Colorectal Cancer Screening/Counseling:   (FOBT Q1 yr; Flex Sig Q4 yrs or Q10 yrs after Screening Colonoscopy; Screening Colonoscpy Q2 yrs High Risk or Q10 yrs Low Risk; Barium Enema Q2 yrs High Risk or Q4 yrs Low Risk)   General:  Patient Declines           Prostate Cancer Screening/Counseling:   (Annual)          Breast Cancer Screening/Counseling:   (Baseline Age 28 - 43; Annual Age 36+)   General:  Patient Declines          Cervical Cancer Screening/Counseling:   (Annual for High Risk or Childbearing Age with Abnormal Pap in Last 3 yrs; Every 2 all others)   General:  Screening Not Indicated           Osteoporosis Screening/Counseling:   (Every 2 Yrs if at risk or more if medically necessary)   General:  Screening Current           AAA Screening/Counseling:   (Once per Lifetime with risk factors)    General:  Screening Not Indicated           Glaucoma Screening/Counseling:   (Annual)    Due for: Referrals:  referral to ophthalmology         HIV Screening/Counseling:   (Voluntary; Once annually for high risk OR 3 times for Pregnancy at diagnosis of IUP; 3rd trimester; and at Labor         Hepatitis C Screening:   Hepatitis C Counseling Provided:  Yes Hepatitis C Screening Accepted: Yes              Immunizations:   Influenza (annual): Influenza UTD This Year   Pneumococcal (Once in a Lifetime):  Pneumococcal Due Today   Zostavax (Medicare D Coverage, Pt >70 yo):  Risks & Benefits Discussed   Tdap (Non-Medicare Wellness Visit required):   Tdap Vaccine UTD       Other Preventative Couseling (Non-Medicare Wellness Visit Required):       Referrals (Non-Medicare Wellness Visit Required):       Medical Equipment/Suppliers:

## 2018-06-22 NOTE — ASSESSMENT & PLAN NOTE
Patient had her last DEXA scan done in March 2017  She was not interested in taking medication for her osteoporosis  She is currently only using vitamin-D 1000 daily  She reports not having very good dairy intake  She was encouraged to start using calcium 600 mg twice daily  We will plan on reviewing her DEXA scan next year to reassess her bone density

## 2018-06-22 NOTE — ASSESSMENT & PLAN NOTE
The patient never followed up with Urology last year after having blood still in the urine upon repeat urinalysis  She did another urine dip today which shows that she does still have some blood in the urine  She was referred to Urology again today

## 2018-06-23 LAB — BACTERIA UR CULT: NORMAL

## 2018-07-06 ENCOUNTER — HOSPITAL ENCOUNTER (OUTPATIENT)
Dept: ULTRASOUND IMAGING | Facility: MEDICAL CENTER | Age: 68
Discharge: HOME | End: 2018-07-06
Payer: COMMERCIAL

## 2018-07-06 DIAGNOSIS — E04.1 NONTOXIC SINGLE THYROID NODULE: ICD-10-CM

## 2018-07-06 PROCEDURE — 76536 US EXAM OF HEAD AND NECK: CPT

## 2018-07-10 LAB
25(OH)D3 SERPL-MCNC: 41 NG/ML (ref 30–100)
ALBUMIN SERPL-MCNC: 4.2 G/DL (ref 3.6–5.1)
ALBUMIN/GLOB SERPL: 1.5 (CALC) (ref 1–2.5)
ALP SERPL-CCNC: 97 U/L (ref 33–130)
ALT SERPL-CCNC: 24 U/L (ref 6–29)
AST SERPL-CCNC: 19 U/L (ref 10–35)
BASOPHILS # BLD AUTO: 41 CELLS/UL (ref 0–200)
BASOPHILS NFR BLD AUTO: 0.7 %
BILIRUB SERPL-MCNC: 0.4 MG/DL (ref 0.2–1.2)
BUN SERPL-MCNC: 12 MG/DL (ref 7–25)
BUN/CREAT SERPL: NORMAL (CALC) (ref 6–22)
CALCIUM SERPL-MCNC: 9 MG/DL (ref 8.6–10.4)
CHLORIDE SERPL-SCNC: 104 MMOL/L (ref 98–110)
CO2 SERPL-SCNC: 25 MMOL/L (ref 20–31)
CREAT SERPL-MCNC: 0.65 MG/DL (ref 0.5–0.99)
EOSINOPHIL # BLD AUTO: 87 CELLS/UL (ref 15–500)
EOSINOPHIL NFR BLD AUTO: 1.5 %
ERYTHROCYTE [DISTWIDTH] IN BLOOD BY AUTOMATED COUNT: 12.2 % (ref 11–15)
GLOBULIN SER CALC-MCNC: 2.8 G/DL (CALC) (ref 1.9–3.7)
GLUCOSE SERPL-MCNC: 89 MG/DL (ref 65–99)
HCT VFR BLD AUTO: 42 % (ref 35–45)
HCV AB S/CO SERPL IA: 0.02
HCV AB SERPL QL IA: NORMAL
HGB BLD-MCNC: 14.1 G/DL (ref 11.7–15.5)
LYMPHOCYTES # BLD AUTO: 2819 CELLS/UL (ref 850–3900)
LYMPHOCYTES NFR BLD AUTO: 48.6 %
MCH RBC QN AUTO: 29.4 PG (ref 27–33)
MCHC RBC AUTO-ENTMCNC: 33.6 G/DL (ref 32–36)
MCV RBC AUTO: 87.5 FL (ref 80–100)
MONOCYTES # BLD AUTO: 429 CELLS/UL (ref 200–950)
MONOCYTES NFR BLD AUTO: 7.4 %
NEUTROPHILS # BLD AUTO: 2424 CELLS/UL (ref 1500–7800)
NEUTROPHILS NFR BLD AUTO: 41.8 %
PLATELET # BLD AUTO: 324 THOUSAND/UL (ref 140–400)
PMV BLD REES-ECKER: 10.3 FL (ref 7.5–12.5)
POTASSIUM SERPL-SCNC: 4.5 MMOL/L (ref 3.5–5.3)
PROT SERPL-MCNC: 7 G/DL (ref 6.1–8.1)
RBC # BLD AUTO: 4.8 MILLION/UL (ref 3.8–5.1)
SL AMB EGFR AFRICAN AMERICAN: 106 ML/MIN/1.73M2
SL AMB EGFR NON AFRICAN AMERICAN: 91 ML/MIN/1.73M2
SODIUM SERPL-SCNC: 138 MMOL/L (ref 135–146)
TSH SERPL-ACNC: 1.62 MIU/L (ref 0.4–4.5)
VIT B12 SERPL-MCNC: 1722 PG/ML (ref 200–1100)
WBC # BLD AUTO: 5.8 THOUSAND/UL (ref 3.8–10.8)

## 2018-11-01 ENCOUNTER — OFFICE VISIT (OUTPATIENT)
Dept: FAMILY MEDICINE CLINIC | Facility: CLINIC | Age: 68
End: 2018-11-01
Payer: COMMERCIAL

## 2018-11-01 VITALS
DIASTOLIC BLOOD PRESSURE: 80 MMHG | HEART RATE: 76 BPM | WEIGHT: 134 LBS | HEIGHT: 62 IN | SYSTOLIC BLOOD PRESSURE: 130 MMHG | BODY MASS INDEX: 24.66 KG/M2 | TEMPERATURE: 99.1 F | OXYGEN SATURATION: 98 %

## 2018-11-01 DIAGNOSIS — K52.9 GASTROENTERITIS: Primary | ICD-10-CM

## 2018-11-01 PROCEDURE — 99213 OFFICE O/P EST LOW 20 MIN: CPT | Performed by: FAMILY MEDICINE

## 2018-11-01 RX ORDER — ONDANSETRON 4 MG/1
4 TABLET, ORALLY DISINTEGRATING ORAL EVERY 6 HOURS PRN
Qty: 10 TABLET | Refills: 0 | Status: SHIPPED | OUTPATIENT
Start: 2018-11-01 | End: 2019-12-05 | Stop reason: SDUPTHER

## 2018-11-01 NOTE — PROGRESS NOTES
Assessment/Plan:     Diagnoses and all orders for this visit:    Gastroenteritis  Comments: Will treat symptoms of viral gastroenteritis with Zofran 0DT tablets every 6 hours as needed  Recommended popsicles and very light diet as she feels better  Orders:  -     ondansetron (ZOFRAN-ODT) 4 mg disintegrating tablet; Take 1 tablet (4 mg total) by mouth every 6 (six) hours as needed for nausea or vomiting          Subjective:      Patient ID: Evelyne Ontiveros is a 76 y o  female  She is here with her   She woke up in the middle the night last night with vomiting  This has continued during the day  She denies diarrhea or change in bowels  Other members of the family had it with a recovered rapidly  She she feels feverish and chilled  The following portions of the patient's history were reviewed and updated as appropriate: allergies, current medications, past family history, past medical history, past social history, past surgical history and problem list     Review of Systems   Constitutional: Positive for chills, fatigue and fever  HENT: Negative for congestion  Respiratory: Negative for cough  Gastrointestinal: Positive for nausea and vomiting  Negative for diarrhea  Objective:      /80   Pulse 76   Temp 99 1 °F (37 3 °C) (Tympanic)   Ht 5' 1 8" (1 57 m)   Wt 60 8 kg (134 lb)   SpO2 98%   BMI 24 67 kg/m²          Physical Exam   Constitutional: She appears well-developed and well-nourished  Appears fatigued   Neck: Normal range of motion  Cardiovascular: Normal rate and regular rhythm  Pulmonary/Chest: Effort normal and breath sounds normal    Abdominal: Soft  Bowel sounds are normal  She exhibits no distension  There is no tenderness  Nursing note and vitals reviewed

## 2018-11-20 ENCOUNTER — OFFICE VISIT (OUTPATIENT)
Dept: FAMILY MEDICINE CLINIC | Facility: CLINIC | Age: 68
End: 2018-11-20
Payer: COMMERCIAL

## 2018-11-20 VITALS
SYSTOLIC BLOOD PRESSURE: 132 MMHG | BODY MASS INDEX: 24.73 KG/M2 | HEIGHT: 62 IN | WEIGHT: 134.4 LBS | HEART RATE: 76 BPM | DIASTOLIC BLOOD PRESSURE: 78 MMHG | RESPIRATION RATE: 16 BRPM | OXYGEN SATURATION: 98 %

## 2018-11-20 DIAGNOSIS — S39.012A STRAIN OF LUMBAR REGION, INITIAL ENCOUNTER: Primary | ICD-10-CM

## 2018-11-20 PROCEDURE — 3008F BODY MASS INDEX DOCD: CPT | Performed by: PHYSICIAN ASSISTANT

## 2018-11-20 PROCEDURE — 1036F TOBACCO NON-USER: CPT | Performed by: PHYSICIAN ASSISTANT

## 2018-11-20 PROCEDURE — 1160F RVW MEDS BY RX/DR IN RCRD: CPT | Performed by: PHYSICIAN ASSISTANT

## 2018-11-20 PROCEDURE — 99213 OFFICE O/P EST LOW 20 MIN: CPT | Performed by: PHYSICIAN ASSISTANT

## 2018-11-20 RX ORDER — CELECOXIB 200 MG/1
200 CAPSULE ORAL DAILY PRN
Qty: 30 CAPSULE | Refills: 0 | Status: SHIPPED | OUTPATIENT
Start: 2018-11-20 | End: 2019-02-14

## 2018-11-20 NOTE — PROGRESS NOTES
[facility]  FAMILY PRACTICE ACUTE OFFICE VISIT       NAME: Antonio Nieves  AGE: 76 y o  SEX: female       : 1950        MRN: [id number]    DATE: 2018  TIME: 7:26 PM    Assessment and Plan     Problem List Items Addressed This Visit     None      Visit Diagnoses     Strain of lumbar region, initial encounter    -  Primary    Reassured appears muscular and not kidney related  Start Celebrex daily with food and try heat  Stretches for back provided  Call if worsens or persists  Relevant Medications    celecoxib (CeleBREX) 200 mg capsule              Chief Complaint     Chief Complaint   Patient presents with    Back Pain     left lower back pain times one week        History of Present Illness   Antonio Sumner is a 76y o -year-old female who presents for back pain  Back Pain   This is a new problem  Episode onset: about a week ago  The problem occurs intermittently  The pain is present in the lumbar spine (on left)  Radiates to: had left shoulder pain too but this has resolved  Pain scale: 5-6/10 today  Associated symptoms include leg pain (chronic in the left leg)  Pertinent negatives include no abdominal pain, bladder incontinence, bowel incontinence, dysuria, fever, numbness, tingling or weakness  Risk factors: notes that he was lifting heavy pot 2 days prior to this happening  Treatments tried: Tylenol a few times  The treatment provided no relief  Review of Systems   Review of Systems   Constitutional: Negative for fever  Gastrointestinal: Negative for abdominal pain and bowel incontinence  Genitourinary: Negative for bladder incontinence and dysuria  Musculoskeletal: Positive for back pain  Neurological: Negative for tingling, weakness and numbness         Active Problem List     Patient Active Problem List   Diagnosis    Fatigue    Microscopic hematuria    Nontoxic single thyroid nodule    Osteoporosis    Positive PPD    Sciatica    Simple goiter    Vitamin B12 deficiency    Vitamin D deficiency    Gastroenteritis         Social History:  Social History     Social History    Marital status: /Civil Union     Spouse name: N/A    Number of children: N/A    Years of education: N/A     Occupational History    Not on file  Social History Main Topics    Smoking status: Never Smoker    Smokeless tobacco: Never Used    Alcohol use No    Drug use: No    Sexual activity: Not on file     Other Topics Concern    Not on file     Social History Narrative    Denied: history of caffeine use       Objective     Vitals:    11/20/18 1627   BP: 132/78   BP Location: Left arm   Patient Position: Sitting   Cuff Size: Standard   Pulse: 76   Resp: 16   SpO2: 98%   Weight: 61 kg (134 lb 6 4 oz)   Height: 5' 1 8" (1 57 m)     Wt Readings from Last 3 Encounters:   11/20/18 61 kg (134 lb 6 4 oz)   11/01/18 60 8 kg (134 lb)   06/22/18 56 8 kg (125 lb 4 oz)       Physical Exam   Constitutional: She appears well-developed and well-nourished  No distress  Cardiovascular: Normal rate, regular rhythm, normal heart sounds and intact distal pulses  No murmur heard  Pulmonary/Chest: Effort normal and breath sounds normal  She has no wheezes  She has no rales  Musculoskeletal:        Right shoulder: She exhibits normal range of motion (but painful with lateral rotation to the left (in the left lumbar area)), no tenderness and no spasm  Lumbar back: She exhibits normal range of motion and no tenderness  Neurological: She has normal strength  No sensory deficit  Reflex Scores:       Patellar reflexes are 2+ on the right side and 2+ on the left side  Negative seated SLR bilaterally   Psychiatric: She has a normal mood and affect   Her behavior is normal            ALLERGIES:  No Known Allergies    Current Medications     Current Outpatient Prescriptions   Medication Sig Dispense Refill    Calcium Carbonate (CALCIUM 600 PO) Take 1 capsule by mouth 2 (two) times a day      cholecalciferol (VITAMIN D3) 1,000 units tablet Take 1 tablet by mouth daily      Red Yeast Rice 600 MG TABS Take 1 capsule by mouth daily      celecoxib (CeleBREX) 200 mg capsule Take 1 capsule (200 mg total) by mouth daily as needed (back pain) Take with food  30 capsule 0    ondansetron (ZOFRAN-ODT) 4 mg disintegrating tablet Take 1 tablet (4 mg total) by mouth every 6 (six) hours as needed for nausea or vomiting (Patient not taking: Reported on 11/20/2018 ) 10 tablet 0     No current facility-administered medications for this visit            Lucia Mello PA-C  11/20/2018 7:26 PM  [facility]

## 2018-11-20 NOTE — PATIENT INSTRUCTIONS
Lower Back Exercises   WHAT YOU NEED TO KNOW:   Lower back exercises help heal and strengthen your back muscles to prevent another injury  Ask your healthcare provider if you need to see a physical therapist for more advanced exercises  DISCHARGE INSTRUCTIONS:   Return to the emergency department if:   · You have severe pain that prevents you from moving  Contact your healthcare provider if:   · Your pain becomes worse  · You have new pain  · You have questions or concerns about your condition or care  Do lower back exercises safely:   · Do the exercises on a mat or firm surface  (not on a bed) to support your spine and prevent low back pain  · Move slowly and smoothly  Avoid fast or jerky motions  · Breathe normally  Do not hold your breath  · Stop if you feel pain  It is normal to feel some discomfort at first  Regular exercise will help decrease your discomfort over time  Lower back exercises: Your healthcare provider may recommend that you do back exercises 10 to 30 minutes each day  He may also recommend that you do exercises 1 to 3 times each day  Ask your healthcare provider which exercises are best for you and how often to do them  · Ankle pumps:  Lie on your back  Move your foot up (with your toes pointing toward your head)  Then, move your foot down (with your toes pointing away from you)  Repeat this exercise 10 times on each side  · Heel slides:  Lie on your back  Slowly bend one leg and then straighten it  Next, bend the other leg and then straighten it  Repeat 10 times on each side  · Pelvic tilt:  Lie on your back with your knees bent and feet flat on the floor  Place your arms in a relaxed position beside your body  Tighten the muscles of your abdomen and flatten your back against the floor  Hold for 5 seconds  Repeat 5 times  · Back stretch:  Lie on your back with your hands behind your head   Bend your knees and turn the lower half of your body to one side  Hold this position for 10 seconds  Repeat 3 times on each side  · Straight leg raises:  Lie on your back with one leg straight  Bend the other knee  Tighten your abdomen and then slowly lift the straight leg up about 6 to 12 inches off the floor  Hold for 1 to 5 seconds  Lower your leg slowly  Repeat 10 times on each leg  · Knee-to-chest:  Lie on your back with your knees bent and feet flat on the floor  Pull one of your knees toward your chest and hold it there for 5 seconds  Return your leg to the starting position  Lift the other knee toward your chest and hold for 5 seconds  Do this 5 times on each side  · Cat and camel:  Place your hands and knees on the floor  Arch your back upward toward the ceiling and lower your head  Round out your spine as much as you can  Hold for 5 seconds  Lift your head upward and push your chest downward toward the floor  Hold for 5 seconds  Do 3 sets or as directed  · Wall squats:  Stand with your back against a wall  Tighten the muscles of your abdomen  Slowly lower your body until your knees are bent at a 45 degree angle  Hold this position for 5 seconds  Slowly move back up to a standing position  Repeat 10 times  · Curl up:  Lie on your back with your knees bent and feet flat on the floor  Place your hands, palms down, underneath the curve in your lower back  Next, with your elbows on the floor, lift your shoulders and chest 2 to 3 inches  Keep your head in line with your shoulders  Hold this position for 5 seconds  When you can do this exercise without pain for 10 to 15 seconds, you may add a rotation  While your shoulders and chest are lifted off the ground, turn slightly to the left and hold  Repeat on the other side  · Bird dog:  Place your hands and knees on the floor  Keep your wrists directly below your shoulders and your knees directly below your hips   Pull your belly button in toward your spine  Do not flatten or arch your back  Tighten your abdominal muscles  Raise one arm straight out so that it is aligned with your head  Next, raise the leg opposite your arm  Hold this position for 15 seconds  Lower your arm and leg slowly and change sides  Do 5 sets  © 2017 [employer] [employer] Information is for End User's use only and may not be sold, redistributed or otherwise used for commercial purposes  All illustrations and images included in CareNotes® are the copyrighted property of [employer] , [employer]  or [employer] Fall[employer]  The above information is an  only  It is not intended as medical advice for individual conditions or treatments  Talk to your doctor, nurse or pharmacist before following any medical regimen to see if it is safe and effective for you

## 2018-11-27 ENCOUNTER — IMMUNIZATION (OUTPATIENT)
Dept: FAMILY MEDICINE CLINIC | Facility: CLINIC | Age: 68
End: 2018-11-27
Payer: COMMERCIAL

## 2018-11-27 DIAGNOSIS — Z23 NEED FOR INFLUENZA VACCINATION: Primary | ICD-10-CM

## 2018-11-27 PROCEDURE — 4040F PNEUMOC VAC/ADMIN/RCVD: CPT | Performed by: PHYSICIAN ASSISTANT

## 2018-11-27 PROCEDURE — G0008 ADMIN INFLUENZA VIRUS VAC: HCPCS | Performed by: PHYSICIAN ASSISTANT

## 2018-11-27 PROCEDURE — 90662 IIV NO PRSV INCREASED AG IM: CPT | Performed by: PHYSICIAN ASSISTANT

## 2019-01-12 NOTE — PROGRESS NOTES
[facility] PRACTICE ACUTE OFFICE VISIT       NAME: Antonio Nieves  AGE: 76 y o  SEX: female       : 1950        MRN: [id number]    DATE: 1/15/2019  TIME: 8:53 PM    Assessment and Plan     Problem List Items Addressed This Visit     Callus of foot     Removed one from each foot - tolerated well  Will monitor for recurrence  Relevant Medications    salicylic acid 17 % gel    Plantar wart of both feet     Reviewed that 4 of her lesions of concern were warts  They were pared down with #15 blade and treated with Histofreeze  Encouraged to use salicylic acid nightly and follow-up in 1 month for repeat treatment if needed  Relevant Medications    salicylic acid 17 % gel      Other Visit Diagnoses     Encounter for screening mammogram for breast cancer    -  Primary    Relevant Orders    Mammo screening bilateral w cad    Encounter for screening colonoscopy        Relevant Orders    Occult Bloood,Fecal Immunochemical          Callus of foot  Removed one from each foot - tolerated well  Will monitor for recurrence  Plantar wart of both feet  Reviewed that 4 of her lesions of concern were warts  They were pared down with #15 blade and treated with Histofreeze  Encouraged to use salicylic acid nightly and follow-up in 1 month for repeat treatment if needed  Chief Complaint     Chief Complaint   Patient presents with    Foot Problem     foot calluses       History of Present Illness   Antonio Wetzel Older is a 76y o -year-old female who presents for removal of lesion from foot  Patient presents today for a callus to be pared down from the base of her right foot  She is previously had this done and found relief for many months  She has tried callus pad but notes that it has not been helpful             Review of Systems   Review of Systems   Skin:        Calluses on bottom of feet       Active Problem List     Patient Active Problem List   Diagnosis  Fatigue    Microscopic hematuria    Nontoxic single thyroid nodule    Osteoporosis    Positive PPD    Sciatica    Simple goiter    Vitamin B12 deficiency    Vitamin D deficiency    Gastroenteritis    Callus of foot    Plantar wart of both feet         Social History:  Social History     Social History    Marital status: /Civil Union     Spouse name: N/A    Number of children: N/A    Years of education: N/A     Occupational History    Not on file  Social History Main Topics    Smoking status: Never Smoker    Smokeless tobacco: Never Used    Alcohol use No    Drug use: No    Sexual activity: Not on file     Other Topics Concern    Not on file     Social History Narrative    Denied: history of caffeine use       Objective     Vitals:    01/15/19 0901   BP: 138/80   BP Location: Left arm   Patient Position: Sitting   Cuff Size: Standard   Pulse: 80   Weight: 62 3 kg (137 lb 6 oz)   Height: 5' 1 8" (1 57 m)     Wt Readings from Last 3 Encounters:   01/15/19 62 3 kg (137 lb 6 oz)   11/20/18 61 kg (134 lb 6 4 oz)   11/01/18 60 8 kg (134 lb)         Physical Exam   Constitutional: She appears well-developed and well-nourished  No distress  Skin:   Single callus and 2 verruca on the plantar aspect of each foot at the level of distal metatarsals   Vitals reviewed      Lesion Destruction  Date/Time: 1/15/2019 8:49 PM  Performed by: Viktor Flowers by: Edith Maciel     Procedure Details - Lesion Destruction:     Number of Lesions:  6  Lesion 1:     Body area:  Lower extremity    Lower extremity location:  R foot    Malignancy: benign lesion      Malignancy comment:  Callus    Destruction method comment:  Removed with 15 blade    Cosmetic: no     Lesion 2:     Body area:  Lower extremity    Lower extremity location:  R foot    Malignancy: benign lesion      Malignancy comment:  Verruca    Destruction method comment:  Pared with 15 blade and treated with Histofreeze x 20 seconds Cosmetic: no     Lesion 3:     Body area:  Lower extremity    Lower extremity location:  R foot    Malignancy: benign lesion      Malignancy comment:  Verruca    Destruction method comment:  Pared with 15 blade and treated with Histofreeze x 20 seconds    Cosmetic: no     Lesion 4:     Body area:  Lower extremity    Lower extremity location:  L foot    Malignancy: benign lesion      Malignancy comment:  Callus    Destruction method comment:  Removed with 15 blade    Cosmetic: no     Lesion 5:     Body area:  Lower extremity    Lower extremity location:  L foot    Malignancy: benign lesion      Malignancy comment:  Verruca    Destruction method comment:  Pared with 15 blade and treated with Histofreeze x 20 seconds    Cosmetic: no     Lesion 6:     Body area:  Lower extremity    Lower extremity location:  L foot    Malignancy: benign lesion      Malignancy comment:  Verruca    Destruction method comment:  Pared with 15 blade and treated with Histofreeze x 20 seconds    Cosmetic: no                   Orders Placed This Encounter   Procedures    Lesion Destruction    Occult Bloood,Fecal Immunochemical    Mammo screening bilateral w cad       ALLERGIES:  No Known Allergies    Current Medications     Current Outpatient Prescriptions   Medication Sig Dispense Refill    Calcium Carbonate (CALCIUM 600 PO) Take 1 capsule by mouth 2 (two) times a day      celecoxib (CeleBREX) 200 mg capsule Take 1 capsule (200 mg total) by mouth daily as needed (back pain) Take with food  30 capsule 0    cholecalciferol (VITAMIN D3) 1,000 units tablet Take 1 tablet by mouth daily      ondansetron (ZOFRAN-ODT) 4 mg disintegrating tablet Take 1 tablet (4 mg total) by mouth every 6 (six) hours as needed for nausea or vomiting 10 tablet 0    Red Yeast Rice 600 MG TABS Take 1 capsule by mouth daily      salicylic acid 17 % gel Apply topically daily at bedtime 15 g 2     No current facility-administered medications for this visit  Dinorah Fowler PA-C  1/15/2019 8:53 PM  [facility]

## 2019-01-15 ENCOUNTER — OFFICE VISIT (OUTPATIENT)
Dept: FAMILY MEDICINE CLINIC | Facility: CLINIC | Age: 69
End: 2019-01-15
Payer: COMMERCIAL

## 2019-01-15 VITALS
HEART RATE: 80 BPM | WEIGHT: 137.38 LBS | HEIGHT: 62 IN | DIASTOLIC BLOOD PRESSURE: 80 MMHG | SYSTOLIC BLOOD PRESSURE: 138 MMHG | BODY MASS INDEX: 25.28 KG/M2

## 2019-01-15 DIAGNOSIS — B07.0 PLANTAR WART OF BOTH FEET: ICD-10-CM

## 2019-01-15 DIAGNOSIS — Z12.11 ENCOUNTER FOR SCREENING COLONOSCOPY: ICD-10-CM

## 2019-01-15 DIAGNOSIS — L84 CALLUS OF FOOT: ICD-10-CM

## 2019-01-15 DIAGNOSIS — Z12.31 ENCOUNTER FOR SCREENING MAMMOGRAM FOR BREAST CANCER: Primary | ICD-10-CM

## 2019-01-15 PROCEDURE — 99213 OFFICE O/P EST LOW 20 MIN: CPT | Performed by: PHYSICIAN ASSISTANT

## 2019-01-16 NOTE — ASSESSMENT & PLAN NOTE
Reviewed that 4 of her lesions of concern were warts  They were pared down with #15 blade and treated with Histofreeze  Encouraged to use salicylic acid nightly and follow-up in 1 month for repeat treatment if needed

## 2019-02-09 NOTE — PROGRESS NOTES
[facility]  FAMILY PRACTICE OFFICE VISIT       NAME: Lyndsey Mcclain  AGE: 76 y o  SEX: female       : 1950        MRN: [id number]    DATE: 2019  TIME: 8:02 PM    Assessment and Plan     Problem List Items Addressed This Visit        Musculoskeletal and Integument    Callus of foot - Primary     Pared down with metal curette without complication  Plantar wart of both feet     Pared down with metal curette and then treated with Histofreeze times 20 seconds per area  Encouraged patient to restart using salicylic acid nightly  Other    Vitamin B12 deficiency     Recheck with labs  Relevant Orders    Vitamin B12    Vitamin D deficiency     On daily 1000 units of vitamin D  Recheck with labs  Relevant Orders    Vitamin D 25 hydroxy    Dizziness     We discussed that she does not appear to have vertigo based upon her negative Familia-Hallpike test today  Will send for labs to further assess  She also notes some difficulty with her vision recently  She will schedule an appointment with Ophthalmology, whom she has not seen in many years  Relevant Orders    CBC and differential    Comprehensive metabolic panel    TSH, 3rd generation with Free T4 reflex    POCT ECG    Paresthesia of left arm     Patient noted some numbness in her left normal walking today  We reviewed her EKG today was normal   She should call if the symptom recurs  Other Visit Diagnoses     Dyslipidemia        Relevant Orders    Lipid Panel with Direct LDL reflex    Decreased visual acuity        Relevant Orders    Ambulatory referral to Ophthalmology          Callus of foot  Pared down with metal curette without complication  Plantar wart of both feet  Pared down with metal curette and then treated with Histofreeze times 20 seconds per area  Encouraged patient to restart using salicylic acid nightly      Dizziness  We discussed that she does not appear to have vertigo based upon her negative Familia-Hallpike test today  Will send for labs to further assess  She also notes some difficulty with her vision recently  She will schedule an appointment with Ophthalmology, whom she has not seen in many years  Paresthesia of left arm  Patient noted some numbness in her left normal walking today  We reviewed her EKG today was normal   She should call if the symptom recurs  Vitamin B12 deficiency  Recheck with labs  Vitamin D deficiency  On daily 1000 units of vitamin D  Recheck with labs  Chief Complaint     Chief Complaint   Patient presents with    Follow-up     plantar wart     Dizziness     for 2 weeks       History of Present Illness   Antonio NÚÑEZ Larisa Ramírez is a 76y o -year-old female who presents for treatment of calluses and warts  Patient presents today to follow-up on the calluses and plantar warts were treated about 1 month ago  They were all repaired down with the #15 blade and then treated with Histofreeze x 20 seconds (on warts)  She was directed to use salicylic acid nightly, which she states she has not been consistent with - stopped using it a few weeks ago  She feels that these have improved over the past month - no longer painful  Also notes concern about some dizziness that she has been experiencing intermittently over the last 2 weeks  She notes that it occurred upon wakening a few times  Sometimes she feels that she is not able to walk in a straight line when it is occurring  During her 1st episode of it did seem as though the room was spinning  She notes that she does have some difficulty with her vision seen an doctor in many years  She also notes that she brief numbness in left arm when she went for a walk today  Dizziness   Pertinent negatives include no chest pain  Review of Systems   Review of Systems   Respiratory: Negative for shortness of breath  Cardiovascular: Negative for chest pain and palpitations  Skin:        Warts and calluses as noted   Neurological: Positive for dizziness         Active Problem List     Patient Active Problem List   Diagnosis    Fatigue    Microscopic hematuria    Nontoxic single thyroid nodule    Osteoporosis    Positive PPD    Sciatica    Simple goiter    Vitamin B12 deficiency    Vitamin D deficiency    Callus of foot    Plantar wart of both feet    Dizziness    Paresthesia of left arm         Past Medical History:  Past Medical History:   Diagnosis Date    Anemia     last assessed: 5/27/2016    Anxiety     last assessed: 11/16/2015    Esophageal reflux     resolved: 2/28/2017    External hemorrhoids     last assessed: 11/20/2012    Migraine     resolved after went through menopause       Past Surgical History:  Past Surgical History:   Procedure Laterality Date    ESOPHAGOGASTRODUODENOSCOPY  2015    diagnostic; neg    FINE NEEDLE ASPIRATION  2013    thyroid nodule-negative for malignancy       Family History:  Family History   Problem Relation Age of Onset    Diabetes Mother         mellitus    Diabetes Father         mellitus    Arthritis Sister     Kidney disease Sister         kidneys are "shrinking" due to lots of protein    Thyroid disease Sister         disorder    Hypertension Sister        Social History:  Social History     Socioeconomic History    Marital status: /Civil Union     Spouse name: Not on file    Number of children: Not on file    Years of education: Not on file    Highest education level: Not on file   Occupational History    Not on file   Social Needs    Financial resource strain: Not on file    Food insecurity:     Worry: Not on file     Inability: Not on file    Transportation needs:     Medical: Not on file     Non-medical: Not on file   Tobacco Use    Smoking status: Never Smoker    Smokeless tobacco: Never Used   Substance and Sexual Activity    Alcohol use: No    Drug use: No    Sexual activity: Not on file Lifestyle    Physical activity:     Days per week: Not on file     Minutes per session: Not on file    Stress: Not on file   Relationships    Social connections:     Talks on phone: Not on file     Gets together: Not on file     Attends Gnosticist service: Not on file     Active member of club or organization: Not on file     Attends meetings of clubs or organizations: Not on file     Relationship status: Not on file    Intimate partner violence:     Fear of current or ex partner: Not on file     Emotionally abused: Not on file     Physically abused: Not on file     Forced sexual activity: Not on file   Other Topics Concern    Not on file   Social History Narrative    Denied: history of caffeine use       Objective     Vitals:    02/14/19 1301   BP: 138/80   BP Location: Left arm   Patient Position: Sitting   Cuff Size: Standard   Pulse: 98   Weight: 62 6 kg (138 lb)   Height: 5' 1 8" (1 57 m)     Wt Readings from Last 3 Encounters:   02/14/19 62 6 kg (138 lb)   01/15/19 62 3 kg (137 lb 6 oz)   11/20/18 61 kg (134 lb 6 4 oz)       Physical Exam   Constitutional: She appears well-developed and well-nourished  No distress  Neck: Normal range of motion  Neck supple  No thyromegaly present  Cardiovascular: Normal rate, normal heart sounds and intact distal pulses  No murmur heard  Pulmonary/Chest: Effort normal and breath sounds normal  She has no wheezes  She has no rales  Musculoskeletal: She exhibits no edema  Lymphadenopathy:     She has no cervical adenopathy  Neurological: She has normal strength  No sensory deficit  Negative Familia-Hallpike bilaterally   Skin: Skin is warm and dry  Callus on the base of the right foot near 2nd MTPJ and 3 verruca across ball of right foot as well as 3 warts noted across the ball of the left foot   Psychiatric: She has a normal mood and affect  Vitals reviewed  EKG: unchanged from previous tracings, normal sinus rhythm     Ventricular rate: 65 bpm      Pertinent Laboratory/Diagnostic Studies:  Lab Results   Component Value Date    GLUCOSE 113 11/19/2015    BUN 12 07/09/2018    CREATININE 0 57 03/06/2017    CALCIUM 9 0 07/09/2018     03/06/2017    K 4 5 07/09/2018    CO2 25 07/09/2018     07/09/2018     Lab Results   Component Value Date    ALT 24 07/09/2018    AST 19 07/09/2018    ALKPHOS 97 07/09/2018    BILITOT 0 3 03/06/2017       Lab Results   Component Value Date    WBC 5 8 07/09/2018    HGB 14 1 07/09/2018    HCT 42 0 07/09/2018    MCV 87 5 07/09/2018     07/09/2018     Lab Results   Component Value Date    CHOL 188 08/30/2016     Lab Results   Component Value Date    TRIG 79 08/30/2016     Lab Results   Component Value Date    HDL 60 08/30/2016     Results for orders placed or performed in visit on 07/09/18   Comprehensive metabolic panel   Result Value Ref Range    Glucose, Random 89 65 - 99 mg/dL    BUN 12 7 - 25 mg/dL    Creatinine 0 65 0 50 - 0 99 mg/dL    eGFR Non  91 > OR = 60 mL/min/1 73m2    eGFR  106 > OR = 60 mL/min/1 73m2    SL AMB BUN/CREATININE RATIO NOT APPLICABLE 6 - 22 (calc)    Sodium 138 135 - 146 mmol/L    Potassium 4 5 3 5 - 5 3 mmol/L    Chloride 104 98 - 110 mmol/L    CO2 25 20 - 31 mmol/L    SL AMB CALCIUM 9 0 8 6 - 10 4 mg/dL    Protein, Total 7 0 6 1 - 8 1 g/dL    Albumin 4 2 3 6 - 5 1 g/dL    Globulin 2 8 1 9 - 3 7 g/dL (calc)    Albumin/Globulin Ratio 1 5 1 0 - 2 5 (calc)    TOTAL BILIRUBIN 0 4 0 2 - 1 2 mg/dL    Alkaline Phosphatase 97 33 - 130 U/L    AST 19 10 - 35 U/L    ALT 24 6 - 29 U/L   CBC and differential   Result Value Ref Range    White Blood Cell Count 5 8 3 8 - 10 8 Thousand/uL    Red Blood Cell Count 4 80 3 80 - 5 10 Million/uL    Hemoglobin 14 1 11 7 - 15 5 g/dL    HCT 42 0 35 0 - 45 0 %    MCV 87 5 80 0 - 100 0 fL    MCH 29 4 27 0 - 33 0 pg    MCHC 33 6 32 0 - 36 0 g/dL    RDW 12 2 11 0 - 15 0 %    Platelet Count 529 998 - 400 Thousand/uL    SL AMB MPV 10 3 7 5 - 12 5 fL    Neutrophils (Absolute) 2,424 1,500 - 7,800 cells/uL    Lymphocytes (Absolute) 2,819 850 - 3,900 cells/uL    Monocytes (Absolute) 429 200 - 950 cells/uL    Eosinophils (Absolute) 87 15 - 500 cells/uL    Basophils ABS 41 0 - 200 cells/uL    Neutrophils 41 8 %    Lymphocytes 48 6 %    Monocytes 7 4 %    Eosinophils 1 5 %    Basophils PCT 0 7 %   Hepatitis C Ab W/Refl To HCV RNA, Qn, PCR   Result Value Ref Range    HEP C AB NON-REACTIVE NON-REACTIVE    Signal to Cut-Off 0 02 <1 00   Vitamin B12   Result Value Ref Range    Vitamin B-12 1,722 (H) 200 - 1,100 pg/mL   Vitamin D 25 hydroxy   Result Value Ref Range    Vitamin D, 25-Hydroxy, Serum 41 30 - 100 ng/mL   TSH, 3rd generation with Free T4 reflex   Result Value Ref Range    TSH W/RFX TO FREE T4 1 62 0 40 - 4 50 mIU/L       Orders Placed This Encounter   Procedures    CBC and differential    Comprehensive metabolic panel    Lipid Panel with Direct LDL reflex    TSH, 3rd generation with Free T4 reflex    Vitamin B12    Vitamin D 25 hydroxy    Ambulatory referral to Ophthalmology    POCT ECG       ALLERGIES:  No Known Allergies    Current Medications     Current Outpatient Medications   Medication Sig Dispense Refill    Calcium Carbonate (CALCIUM 600 PO) Take 1 capsule by mouth 2 (two) times a day      cholecalciferol (VITAMIN D3) 1,000 units tablet Take 1 tablet by mouth daily      ondansetron (ZOFRAN-ODT) 4 mg disintegrating tablet Take 1 tablet (4 mg total) by mouth every 6 (six) hours as needed for nausea or vomiting 10 tablet 0    Red Yeast Rice 600 MG TABS Take 1 capsule by mouth daily      salicylic acid 17 % gel Apply topically daily at bedtime (Patient not taking: Reported on 2/14/2019) 15 g 2     No current facility-administered medications for this visit            Health Maintenance     Health Maintenance   Topic Date Due    BMI: Followup Plan  03/21/1968    CRC Screening: FOBTx3/FIT  03/21/2000    MAMMOGRAM  03/02/2018  DXA SCAN  03/01/2019    DTaP,Tdap,and Td Vaccines (2 - Td) 05/12/2019    Fall Risk  06/22/2019    Depression Screening PHQ  06/22/2019    Urinary Incontinence Screening  06/22/2019    Medicare Annual Wellness Visit (AWV)  06/22/2019    BMI: Adult  02/14/2020    Hepatitis C Screening  Completed    INFLUENZA VACCINE  Completed    Pneumococcal PPSV23/PCV13 65+ Years / High and Highest Risk  Completed    HEPATITIS B VACCINES  Aged Out     Immunization History   Administered Date(s) Administered    H1N1, All Formulations 10/29/2009    Hep A, adult 05/12/2009    Influenza Split High Dose Preservative Free IM 09/01/2016, 10/05/2017    Influenza TIV (IM) 12/19/2005, 11/08/2008, 10/11/2012, 09/26/2014, 09/16/2015    Influenza, high dose seasonal 0 5 mL 11/27/2018    Pneumococcal Conjugate 13-Valent 02/28/2017    Pneumococcal Polysaccharide PPV23 06/22/2018    Tdap 05/12/2009    Tuberculin Skin Test-PPD Intradermal 04/01/1998       Param Alan PA-C  2/14/2019 8:02 PM  [facility]

## 2019-02-14 ENCOUNTER — OFFICE VISIT (OUTPATIENT)
Dept: FAMILY MEDICINE CLINIC | Facility: CLINIC | Age: 69
End: 2019-02-14
Payer: COMMERCIAL

## 2019-02-14 VITALS
WEIGHT: 138 LBS | SYSTOLIC BLOOD PRESSURE: 138 MMHG | HEIGHT: 62 IN | BODY MASS INDEX: 25.4 KG/M2 | DIASTOLIC BLOOD PRESSURE: 80 MMHG | HEART RATE: 98 BPM

## 2019-02-14 DIAGNOSIS — B07.0 PLANTAR WART OF BOTH FEET: ICD-10-CM

## 2019-02-14 DIAGNOSIS — R42 DIZZINESS: ICD-10-CM

## 2019-02-14 DIAGNOSIS — R20.2 PARESTHESIA OF LEFT ARM: ICD-10-CM

## 2019-02-14 DIAGNOSIS — L84 CALLUS OF FOOT: Primary | ICD-10-CM

## 2019-02-14 DIAGNOSIS — E55.9 VITAMIN D DEFICIENCY: ICD-10-CM

## 2019-02-14 DIAGNOSIS — E78.5 DYSLIPIDEMIA: ICD-10-CM

## 2019-02-14 DIAGNOSIS — H54.7 DECREASED VISUAL ACUITY: ICD-10-CM

## 2019-02-14 DIAGNOSIS — E53.8 VITAMIN B12 DEFICIENCY: ICD-10-CM

## 2019-02-14 PROBLEM — K52.9 GASTROENTERITIS: Status: RESOLVED | Noted: 2018-11-01 | Resolved: 2019-02-14

## 2019-02-14 PROCEDURE — 3008F BODY MASS INDEX DOCD: CPT | Performed by: PHYSICIAN ASSISTANT

## 2019-02-14 PROCEDURE — 93000 ELECTROCARDIOGRAM COMPLETE: CPT | Performed by: PHYSICIAN ASSISTANT

## 2019-02-14 PROCEDURE — 99214 OFFICE O/P EST MOD 30 MIN: CPT | Performed by: PHYSICIAN ASSISTANT

## 2019-02-14 PROCEDURE — 1160F RVW MEDS BY RX/DR IN RCRD: CPT | Performed by: PHYSICIAN ASSISTANT

## 2019-02-15 NOTE — ASSESSMENT & PLAN NOTE
Patient noted some numbness in her left normal walking today  We reviewed her EKG today was normal   She should call if the symptom recurs

## 2019-02-15 NOTE — ASSESSMENT & PLAN NOTE
Pared down with metal curette and then treated with Histofreeze times 20 seconds per area  Encouraged patient to restart using salicylic acid nightly

## 2019-02-15 NOTE — ASSESSMENT & PLAN NOTE
We discussed that she does not appear to have vertigo based upon her negative [place]-Hallpike test today  Will send for labs to further assess  She also notes some difficulty with her vision recently  She will schedule an appointment with Ophthalmology, whom she has not seen in many years

## 2019-02-18 LAB
25(OH)D3 SERPL-MCNC: 40 NG/ML (ref 30–100)
ALBUMIN SERPL-MCNC: 4.4 G/DL (ref 3.6–5.1)
ALBUMIN/GLOB SERPL: 1.7 (CALC) (ref 1–2.5)
ALP SERPL-CCNC: 82 U/L (ref 33–130)
ALT SERPL-CCNC: 12 U/L (ref 6–29)
AST SERPL-CCNC: 13 U/L (ref 10–35)
BASOPHILS # BLD AUTO: 31 CELLS/UL (ref 0–200)
BASOPHILS NFR BLD AUTO: 0.6 %
BILIRUB SERPL-MCNC: 0.4 MG/DL (ref 0.2–1.2)
BUN SERPL-MCNC: 13 MG/DL (ref 7–25)
BUN/CREAT SERPL: NORMAL (CALC) (ref 6–22)
CALCIUM SERPL-MCNC: 9.8 MG/DL (ref 8.6–10.4)
CHLORIDE SERPL-SCNC: 104 MMOL/L (ref 98–110)
CHOLEST SERPL-MCNC: 208 MG/DL
CHOLEST/HDLC SERPL: 3.4 (CALC)
CO2 SERPL-SCNC: 27 MMOL/L (ref 20–32)
CREAT SERPL-MCNC: 0.73 MG/DL (ref 0.5–0.99)
EOSINOPHIL # BLD AUTO: 68 CELLS/UL (ref 15–500)
EOSINOPHIL NFR BLD AUTO: 1.3 %
ERYTHROCYTE [DISTWIDTH] IN BLOOD BY AUTOMATED COUNT: 12 % (ref 11–15)
GLOBULIN SER CALC-MCNC: 2.6 G/DL (CALC) (ref 1.9–3.7)
GLUCOSE SERPL-MCNC: 98 MG/DL (ref 65–99)
HCT VFR BLD AUTO: 42.8 % (ref 35–45)
HDLC SERPL-MCNC: 61 MG/DL
HGB BLD-MCNC: 14.5 G/DL (ref 11.7–15.5)
LDLC SERPL CALC-MCNC: 127 MG/DL (CALC)
LYMPHOCYTES # BLD AUTO: 2766 CELLS/UL (ref 850–3900)
LYMPHOCYTES NFR BLD AUTO: 53.2 %
MCH RBC QN AUTO: 29.6 PG (ref 27–33)
MCHC RBC AUTO-ENTMCNC: 33.9 G/DL (ref 32–36)
MCV RBC AUTO: 87.3 FL (ref 80–100)
MONOCYTES # BLD AUTO: 380 CELLS/UL (ref 200–950)
MONOCYTES NFR BLD AUTO: 7.3 %
NEUTROPHILS # BLD AUTO: 1955 CELLS/UL (ref 1500–7800)
NEUTROPHILS NFR BLD AUTO: 37.6 %
NONHDLC SERPL-MCNC: 147 MG/DL (CALC)
PLATELET # BLD AUTO: 275 THOUSAND/UL (ref 140–400)
PMV BLD REES-ECKER: 10.7 FL (ref 7.5–12.5)
POTASSIUM SERPL-SCNC: 4.4 MMOL/L (ref 3.5–5.3)
PROT SERPL-MCNC: 7 G/DL (ref 6.1–8.1)
RBC # BLD AUTO: 4.9 MILLION/UL (ref 3.8–5.1)
SL AMB EGFR AFRICAN AMERICAN: 98 ML/MIN/1.73M2
SL AMB EGFR NON AFRICAN AMERICAN: 85 ML/MIN/1.73M2
SODIUM SERPL-SCNC: 139 MMOL/L (ref 135–146)
TRIGL SERPL-MCNC: 92 MG/DL
TSH SERPL-ACNC: 1.91 MIU/L (ref 0.4–4.5)
VIT B12 SERPL-MCNC: 420 PG/ML (ref 200–1100)
WBC # BLD AUTO: 5.2 THOUSAND/UL (ref 3.8–10.8)

## 2019-02-19 ENCOUNTER — TELEPHONE (OUTPATIENT)
Dept: FAMILY MEDICINE CLINIC | Facility: CLINIC | Age: 69
End: 2019-02-19

## 2019-02-19 DIAGNOSIS — R42 DIZZINESS: Primary | ICD-10-CM

## 2019-02-19 DIAGNOSIS — E78.5 HYPERLIPIDEMIA, UNSPECIFIED HYPERLIPIDEMIA TYPE: ICD-10-CM

## 2019-02-19 RX ORDER — ATORVASTATIN CALCIUM 20 MG/1
20 TABLET, FILM COATED ORAL DAILY
Qty: 30 TABLET | Refills: 3
Start: 2019-02-19 | End: 2019-04-02 | Stop reason: SDUPTHER

## 2019-02-19 NOTE — TELEPHONE ENCOUNTER
----- Message from Marzena Chen PA-C sent at 2/17/2019 11:53 PM EST -----  Please tell the patient that her cholesterol was elevated  Her calculated ASCVD risk is 8 7% so a statin is recommended for lowering her cholesterol and risk  If agreeable to this, please start her on atorvastatin 20 mg daily, have her stop her red yeast rice, and provide her slips to recheck lipid panel and CMP in 3 months  If not agreeable to starting this, please offer an appointment to discuss further  Her other labs were okay

## 2019-04-01 LAB
ALBUMIN SERPL-MCNC: 4.1 G/DL (ref 3.6–5.1)
ALBUMIN/GLOB SERPL: 1.6 (CALC) (ref 1–2.5)
ALP SERPL-CCNC: 74 U/L (ref 33–130)
ALT SERPL-CCNC: 13 U/L (ref 6–29)
AST SERPL-CCNC: 14 U/L (ref 10–35)
BILIRUB SERPL-MCNC: 0.5 MG/DL (ref 0.2–1.2)
BUN SERPL-MCNC: 14 MG/DL (ref 7–25)
BUN/CREAT SERPL: NORMAL (CALC) (ref 6–22)
CALCIUM SERPL-MCNC: 9.1 MG/DL (ref 8.6–10.4)
CHLORIDE SERPL-SCNC: 106 MMOL/L (ref 98–110)
CHOLEST SERPL-MCNC: 145 MG/DL
CHOLEST/HDLC SERPL: 2.7 (CALC)
CO2 SERPL-SCNC: 30 MMOL/L (ref 20–32)
CREAT SERPL-MCNC: 0.62 MG/DL (ref 0.5–0.99)
GLOBULIN SER CALC-MCNC: 2.5 G/DL (CALC) (ref 1.9–3.7)
GLUCOSE SERPL-MCNC: 88 MG/DL (ref 65–99)
HDLC SERPL-MCNC: 54 MG/DL
LDLC SERPL CALC-MCNC: 75 MG/DL (CALC)
NONHDLC SERPL-MCNC: 91 MG/DL (CALC)
POTASSIUM SERPL-SCNC: 4 MMOL/L (ref 3.5–5.3)
PROT SERPL-MCNC: 6.6 G/DL (ref 6.1–8.1)
SL AMB EGFR AFRICAN AMERICAN: 107 ML/MIN/1.73M2
SL AMB EGFR NON AFRICAN AMERICAN: 92 ML/MIN/1.73M2
SODIUM SERPL-SCNC: 140 MMOL/L (ref 135–146)
TRIGL SERPL-MCNC: 84 MG/DL

## 2019-04-02 DIAGNOSIS — E78.5 HYPERLIPIDEMIA, UNSPECIFIED HYPERLIPIDEMIA TYPE: ICD-10-CM

## 2019-04-02 RX ORDER — ATORVASTATIN CALCIUM 20 MG/1
20 TABLET, FILM COATED ORAL DAILY
Qty: 90 TABLET | Refills: 3
Start: 2019-04-02 | End: 2020-03-14

## 2019-04-05 ENCOUNTER — TELEPHONE (OUTPATIENT)
Dept: FAMILY MEDICINE CLINIC | Facility: CLINIC | Age: 69
End: 2019-04-05

## 2019-04-16 ENCOUNTER — OFFICE VISIT (OUTPATIENT)
Dept: FAMILY MEDICINE CLINIC | Facility: CLINIC | Age: 69
End: 2019-04-16
Payer: COMMERCIAL

## 2019-04-16 VITALS
WEIGHT: 135 LBS | HEART RATE: 78 BPM | DIASTOLIC BLOOD PRESSURE: 66 MMHG | OXYGEN SATURATION: 98 % | SYSTOLIC BLOOD PRESSURE: 132 MMHG | TEMPERATURE: 98.3 F | BODY MASS INDEX: 24.84 KG/M2 | HEIGHT: 62 IN

## 2019-04-16 DIAGNOSIS — R42 DIZZINESS: ICD-10-CM

## 2019-04-16 DIAGNOSIS — F41.9 ANXIETY: Primary | ICD-10-CM

## 2019-04-16 PROCEDURE — 1160F RVW MEDS BY RX/DR IN RCRD: CPT | Performed by: PHYSICIAN ASSISTANT

## 2019-04-16 PROCEDURE — 99213 OFFICE O/P EST LOW 20 MIN: CPT | Performed by: PHYSICIAN ASSISTANT

## 2019-04-16 PROCEDURE — 3008F BODY MASS INDEX DOCD: CPT | Performed by: PHYSICIAN ASSISTANT

## 2019-04-16 PROCEDURE — 1036F TOBACCO NON-USER: CPT | Performed by: PHYSICIAN ASSISTANT

## 2019-04-16 RX ORDER — ALPRAZOLAM 0.25 MG/1
.125-.25 TABLET ORAL DAILY PRN
Qty: 15 TABLET | Refills: 0 | Status: SHIPPED | OUTPATIENT
Start: 2019-04-16 | End: 2019-12-05

## 2019-06-25 DIAGNOSIS — E78.5 HYPERLIPIDEMIA, UNSPECIFIED HYPERLIPIDEMIA TYPE: ICD-10-CM

## 2019-06-25 RX ORDER — ATORVASTATIN CALCIUM 20 MG/1
20 TABLET, FILM COATED ORAL DAILY
Qty: 90 TABLET | Refills: 3 | Status: CANCELLED | OUTPATIENT
Start: 2019-06-25

## 2019-10-07 ENCOUNTER — IMMUNIZATIONS (OUTPATIENT)
Dept: FAMILY MEDICINE CLINIC | Facility: CLINIC | Age: 69
End: 2019-10-07
Payer: COMMERCIAL

## 2019-10-07 DIAGNOSIS — Z23 NEED FOR INFLUENZA VACCINATION: Primary | ICD-10-CM

## 2019-10-07 PROCEDURE — 90662 IIV NO PRSV INCREASED AG IM: CPT

## 2019-10-07 PROCEDURE — G0008 ADMIN INFLUENZA VIRUS VAC: HCPCS

## 2019-12-04 NOTE — PROGRESS NOTES
FAMILY PRACTICE OFFICE VISIT  [facility] - [facility]       NAME: Antonio Nieves  AGE: 71 y o  SEX: female       : 1950        MRN: [id number]    DATE: 2019  TIME: 6:55 PM    Assessment and Plan     Problem List Items Addressed This Visit        Endocrine    Nontoxic single thyroid nodule     Reviewed results of ultrasound from last year which states no further follow-up is needed since there is not been a change in size over the last 5 years  Relevant Orders    TSH, 3rd generation with Free T4 reflex       Musculoskeletal and Integument    Plantar wart of both feet     Warts were treated curettage bilaterally  She was given a refill to restart using salicylic acid gel at bedtime  Encouraged to continue it until her skin lines are no longer interrupted in each area  Call with any problems or concerns  Relevant Medications    salicylic acid 17 % gel       Other    Vitamin B12 deficiency     Recheck with labs as ordered today  Relevant Orders    CBC and differential    Vitamin B12    Vitamin D deficiency     Currently vitamin-D 1000 units daily  Recheck with labs ordered today  Relevant Orders    Vitamin D 25 hydroxy    Callus of foot     Removed completely with curettage today  Return as needed for removal          Anxiety     Improved since last year  Will continue to monitor  No longer feels that she requires for travel  Other Visit Diagnoses     Medicare annual wellness visit, subsequent    -  Primary    Post-menopause        Relevant Orders    DXA bone density spine hip and pelvis    Gastroenteritis        Given refill on Zofran to have in case needed while on upcoming trip      Relevant Medications    ondansetron (ZOFRAN-ODT) 4 mg disintegrating tablet    Screening for colorectal cancer        Relevant Orders    Occult Blood, Fecal Immunochemical    Need for Tdap vaccination        Relevant Medications tetanus-diphtheria-acellular pertussis (BOOSTRIX) injection    Postnasal drip        Encouraged to try Flonase 2 sprays per nostril daily over the next few weeks  Can continue if beneficial     Relevant Medications    fluticasone (FLONASE) 50 mcg/act nasal spray    Diabetes mellitus screening        Relevant Orders    Comprehensive metabolic panel    Lipid screening        Relevant Orders    Lipid Panel with Direct LDL reflex          Nontoxic single thyroid nodule  Reviewed results of ultrasound from last year which states no further follow-up is needed since there is not been a change in size over the last 5 years  Plantar wart of both feet  Warts were treated curettage bilaterally  She was given a refill to restart using salicylic acid gel at bedtime  Encouraged to continue it until her skin lines are no longer interrupted in each area  Call with any problems or concerns  Anxiety  Improved since last year  Will continue to monitor  No longer feels that she requires for travel  Callus of foot  Removed completely with curettage today  Return as needed for removal     Vitamin B12 deficiency  Recheck with labs as ordered today  Vitamin D deficiency  Currently vitamin-D 1000 units daily  Recheck with labs ordered today  Chief Complaint     Chief Complaint   Patient presents with    Medicare Wellness Visit    Follow-up     anxiety     Foot Pain     left heel pain for the past 2 weeks       History of Present Illness   Antonio De La Cruz is a 71y o -year-old female who presents for Medicare AWV and chronic problem follow-up visit  The patient reports that recently anxiety level has been improved  She has not been using the Xanax - only had it for travel and took 1/2 tab  Today's PHQ2 score was 0  The patient continues with Lipitor 20 milligrams daily  Last LDL was 75 in April 2019  The patient denies myalgias        The patient has a history of vitamin-D deficiency and is currently taking 1000 units daily  Last vitamin-D level was 40 in February 2019  She notes the heels have been bothering her for 2 weeks - notes that is worse on the left - better with walking and massage - worse after sitting -     She notes that her sister who is 77 y o  Was recently diagnosed with breast cancer - had lumpectomy and had chemo - doing well currently         Review of Systems   Review of Systems   Constitutional: Negative for chills and fever  HENT: Positive for postnasal drip and voice change  Negative for congestion, rhinorrhea and sore throat  Eyes: Negative for visual disturbance  Respiratory: Negative for cough, shortness of breath and wheezing  Cardiovascular: Positive for palpitations (only when stressed - heart beats a little fast - every 3 months or so - lasts for no more 20 seconds)  Negative for chest pain and leg swelling  Gastrointestinal: Negative for abdominal pain, constipation, diarrhea, nausea and vomiting  Endocrine: Negative for polydipsia and polyuria  Genitourinary: Negative for dysuria and frequency  Musculoskeletal: Negative for arthralgias and myalgias  Heel pain as in HPI   Skin: Negative for rash  Neurological: Negative for dizziness and headaches  Hematological: Does not bruise/bleed easily  Psychiatric/Behavioral: Negative for dysphoric mood  The patient is nervous/anxious          Active Problem List     Patient Active Problem List   Diagnosis    Fatigue    Microscopic hematuria    Nontoxic single thyroid nodule    Osteoporosis    Positive PPD    Sciatica    Simple goiter    Vitamin B12 deficiency    Vitamin D deficiency    Callus of foot    Plantar wart of both feet    Dizziness    Paresthesia of left arm    Anxiety         Past Medical History:  Past Medical History:   Diagnosis Date    Anemia     last assessed: 5/27/2016    Anxiety     last assessed: 11/16/2015    Esophageal reflux     resolved: 2/28/2017    External hemorrhoids     last assessed: 11/20/2012    Migraine     resolved after went through menopause       Past Surgical History:  Past Surgical History:   Procedure Laterality Date    ESOPHAGOGASTRODUODENOSCOPY  2015    diagnostic; neg    FINE NEEDLE ASPIRATION  2013    thyroid nodule-negative for malignancy       Family History:  Family History   Problem Relation Age of Onset    Diabetes Mother         mellitus    Diabetes Father         mellitus    Arthritis Sister     Kidney disease Sister         kidneys are "shrinking" due to lots of protein    Thyroid disease Sister         disorder    Hypertension Sister     Breast cancer Sister     Breast cancer Family        Social History:  Social History     Socioeconomic History    Marital status: /Civil Union     Spouse name: Not on file    Number of children: Not on file    Years of education: Not on file    Highest education level: Not on file   Occupational History    Not on file   Social Needs    Financial resource strain: Not on file    Food insecurity:     Worry: Not on file     Inability: Not on file    Transportation needs:     Medical: Not on file     Non-medical: Not on file   Tobacco Use    Smoking status: Never Smoker    Smokeless tobacco: Never Used   Substance and Sexual Activity    Alcohol use: Never     Frequency: Never    Drug use: Never    Sexual activity: Not on file   Lifestyle    Physical activity:     Days per week: Not on file     Minutes per session: Not on file    Stress: Not on file   Relationships    Social connections:     Talks on phone: Not on file     Gets together: Not on file     Attends Taoism service: Not on file     Active member of club or organization: Not on file     Attends meetings of clubs or organizations: Not on file     Relationship status: Not on file    Intimate partner violence:     Fear of current or ex partner: Not on file     Emotionally abused: Not on file     Physically abused: Not on file     Forced sexual activity: Not on file   Other Topics Concern    Not on file   Social History Narrative    Denied: history of caffeine use       Objective     Vitals:    12/05/19 1039   BP: 130/74   BP Location: Left arm   Patient Position: Sitting   Cuff Size: Standard   Pulse: 72   Temp: 98 1 °F (36 7 °C)   Weight: 60 kg (132 lb 3 2 oz)   Height: 5' 1" (1 549 m)     Wt Readings from Last 3 Encounters:   12/05/19 60 kg (132 lb 3 2 oz)   04/16/19 61 2 kg (135 lb)   02/14/19 62 6 kg (138 lb)       Physical Exam   Constitutional: She appears well-developed and well-nourished  HENT:   Head: Normocephalic and atraumatic  Right Ear: Hearing, tympanic membrane, external ear and ear canal normal    Left Ear: Hearing, tympanic membrane, external ear and ear canal normal    Nose: Nose normal    Mouth/Throat: Oropharynx is clear and moist and mucous membranes are normal    Eyes: Pupils are equal, round, and reactive to light  Conjunctivae and lids are normal    Neck: Trachea normal and normal range of motion  Neck supple  Carotid bruit is not present  Thyromegaly (Mild diffuse enlargement) present  No thyroid mass present  Cardiovascular: Normal rate, regular rhythm, S1 normal, S2 normal, normal heart sounds and intact distal pulses  No murmur heard  Pulses:       Radial pulses are 2+ on the right side, and 2+ on the left side  Posterior tibial pulses are 2+ on the right side, and 2+ on the left side  Pulmonary/Chest: Effort normal and breath sounds normal  She has no decreased breath sounds  She has no wheezes  She has no rhonchi  She has no rales  Abdominal: Soft  Normal appearance and bowel sounds are normal  She exhibits no distension and no mass  There is no hepatosplenomegaly  There is no tenderness  No hernia  Musculoskeletal: Normal range of motion  She exhibits no edema  Lymphadenopathy:     She has no cervical adenopathy  Neurological: She is alert   She has normal strength  No sensory deficit (light touch sensation intact and equal in UE and LE bilaterally)  Skin: Skin is warm and dry  No rash noted  Scattered warts present the plantar aspect of both as well a callus present plantar aspect the right near 2nd MTP joint   Psychiatric: She has a normal mood and affect  Her behavior is normal    Vitals reviewed        Pertinent Laboratory/Diagnostic Studies:  Lab Results   Component Value Date    GLUCOSE 113 11/19/2015    BUN 14 04/01/2019    CREATININE 0 62 04/01/2019    CALCIUM 9 1 04/01/2019     03/06/2017    K 4 0 04/01/2019    CO2 30 04/01/2019     04/01/2019     Lab Results   Component Value Date    ALT 13 04/01/2019    AST 14 04/01/2019    ALKPHOS 74 04/01/2019    BILITOT 0 3 03/06/2017       Lab Results   Component Value Date    WBC 5 2 02/15/2019    HGB 14 5 02/15/2019    HCT 42 8 02/15/2019    MCV 87 3 02/15/2019     02/15/2019     Lab Results   Component Value Date    CHOL 188 08/30/2016     Lab Results   Component Value Date    TRIG 84 04/01/2019     Lab Results   Component Value Date    HDL 54 04/01/2019     Results for orders placed or performed in visit on 04/01/19   Lipid Panel with Direct LDL reflex   Result Value Ref Range    Total Cholesterol 145 <200 mg/dL    HDL 54 >50 mg/dL    Triglycerides 84 <150 mg/dL    LDL Direct 75 mg/dL (calc)    Chol HDLC Ratio 2 7 <5 0 (calc)    Non-HDL Cholesterol 91 <130 mg/dL (calc)   Comprehensive metabolic panel   Result Value Ref Range    Glucose, Random 88 65 - 99 mg/dL    BUN 14 7 - 25 mg/dL    Creatinine 0 62 0 50 - 0 99 mg/dL    eGFR Non  92 > OR = 60 mL/min/1 73m2    eGFR  107 > OR = 60 mL/min/1 73m2    SL AMB BUN/CREATININE RATIO NOT APPLICABLE 6 - 22 (calc)    Sodium 140 135 - 146 mmol/L    Potassium 4 0 3 5 - 5 3 mmol/L    Chloride 106 98 - 110 mmol/L    CO2 30 20 - 32 mmol/L    SL AMB CALCIUM 9 1 8 6 - 10 4 mg/dL    Protein, Total 6 6 6 1 - 8 1 g/dL    Albumin 4 1 3 6 - 5 1 g/dL    Globulin 2 5 1 9 - 3 7 g/dL (calc)    Albumin/Globulin Ratio 1 6 1 0 - 2 5 (calc)    TOTAL BILIRUBIN 0 5 0 2 - 1 2 mg/dL    Alkaline Phosphatase 74 33 - 130 U/L    AST 14 10 - 35 U/L    ALT 13 6 - 29 U/L       Orders Placed This Encounter   Procedures    Occult Blood, Fecal Immunochemical    DXA bone density spine hip and pelvis    CBC and differential    Comprehensive metabolic panel    TSH, 3rd generation with Free T4 reflex    Lipid Panel with Direct LDL reflex    Vitamin D 25 hydroxy    Vitamin B12       ALLERGIES:  No Known Allergies    Current Medications     Current Outpatient Medications   Medication Sig Dispense Refill    atorvastatin (LIPITOR) 20 mg tablet Take 1 tablet (20 mg total) by mouth daily 90 tablet 3    Calcium Carbonate (CALCIUM 600 PO) Take 1 capsule by mouth 2 (two) times a day      cholecalciferol (VITAMIN D3) 1,000 units tablet Take 1 tablet by mouth daily      ondansetron (ZOFRAN-ODT) 4 mg disintegrating tablet Take 1 tablet (4 mg total) by mouth every 6 (six) hours as needed for nausea or vomiting 10 tablet 0    fluticasone (FLONASE) 50 mcg/act nasal spray 2 sprays into each nostril daily 16 g 0    salicylic acid 17 % gel Apply topically daily at bedtime 15 g 2    tetanus-diphtheria-acellular pertussis (BOOSTRIX) injection Inject 0 5 mL into a muscle once for 1 dose 0 5 mL 0     No current facility-administered medications for this visit            Health Maintenance     Health Maintenance   Topic Date Due    Cervical Cancer Screening  03/21/1971    CRC Screening: FOBTx3/FIT  03/21/2000    MAMMOGRAM  03/02/2018    DXA SCAN  03/01/2019    DTaP,Tdap,and Td Vaccines (2 - Td) 05/12/2019    Medicare Annual Wellness Visit (AWV)  06/22/2019    Fall Risk  12/05/2020    Depression Screening PHQ  12/05/2020    Urinary Incontinence Screening  12/05/2020    BMI: Adult  12/05/2020    Hepatitis C Screening  Completed    Influenza Vaccine  Completed    Pneumococcal Vaccine: 65+ Years  Completed    Pneumococcal Vaccine: Pediatrics (0 to 5 Years) and At-Risk Patients (6 to 59 Years)  Aged Out    HIB Vaccine  Aged Out    Hepatitis B Vaccine  Aged Out    IPV Vaccine  Aged Out    Hepatitis A Vaccine  Aged Out    Meningococcal ACWY Vaccine  Aged Out    HPV Vaccine  Aged [employer] History   Administered Date(s) Administered    H1N1, All Formulations 10/29/2009    Hep A, adult 05/12/2009    Influenza Split High Dose Preservative Free IM 09/01/2016, 10/05/2017    Influenza TIV (IM) 12/19/2005, 11/08/2008, 10/11/2012, 09/26/2014, 09/16/2015    Influenza, high dose seasonal 0 5 mL 11/27/2018, 10/07/2019    Pneumococcal Conjugate 13-Valent 02/28/2017    Pneumococcal Polysaccharide PPV23 06/22/2018    Tdap 05/12/2009    Tuberculin Skin Test-PPD Intradermal 04/01/1998       Larry Brian PA-C  12/5/2019 6:55 PM  [facility]

## 2019-12-05 ENCOUNTER — OFFICE VISIT (OUTPATIENT)
Dept: FAMILY MEDICINE CLINIC | Facility: CLINIC | Age: 69
End: 2019-12-05
Payer: COMMERCIAL

## 2019-12-05 VITALS
TEMPERATURE: 98.1 F | WEIGHT: 132.2 LBS | DIASTOLIC BLOOD PRESSURE: 74 MMHG | HEIGHT: 61 IN | SYSTOLIC BLOOD PRESSURE: 130 MMHG | BODY MASS INDEX: 24.96 KG/M2 | HEART RATE: 72 BPM

## 2019-12-05 DIAGNOSIS — Z23 NEED FOR TDAP VACCINATION: ICD-10-CM

## 2019-12-05 DIAGNOSIS — Z13.220 LIPID SCREENING: ICD-10-CM

## 2019-12-05 DIAGNOSIS — K52.9 GASTROENTERITIS: ICD-10-CM

## 2019-12-05 DIAGNOSIS — Z12.12 SCREENING FOR COLORECTAL CANCER: ICD-10-CM

## 2019-12-05 DIAGNOSIS — Z13.1 DIABETES MELLITUS SCREENING: ICD-10-CM

## 2019-12-05 DIAGNOSIS — Z12.11 SCREENING FOR COLORECTAL CANCER: ICD-10-CM

## 2019-12-05 DIAGNOSIS — E53.8 VITAMIN B12 DEFICIENCY: ICD-10-CM

## 2019-12-05 DIAGNOSIS — Z00.00 MEDICARE ANNUAL WELLNESS VISIT, SUBSEQUENT: Primary | ICD-10-CM

## 2019-12-05 DIAGNOSIS — Z78.0 POST-MENOPAUSE: ICD-10-CM

## 2019-12-05 DIAGNOSIS — R09.82 POSTNASAL DRIP: ICD-10-CM

## 2019-12-05 DIAGNOSIS — B07.0 PLANTAR WART OF BOTH FEET: ICD-10-CM

## 2019-12-05 DIAGNOSIS — F41.9 ANXIETY: ICD-10-CM

## 2019-12-05 DIAGNOSIS — E55.9 VITAMIN D DEFICIENCY: ICD-10-CM

## 2019-12-05 DIAGNOSIS — E04.1 NONTOXIC SINGLE THYROID NODULE: ICD-10-CM

## 2019-12-05 DIAGNOSIS — L84 CALLUS OF FOOT: ICD-10-CM

## 2019-12-05 PROCEDURE — 1125F AMNT PAIN NOTED PAIN PRSNT: CPT | Performed by: PHYSICIAN ASSISTANT

## 2019-12-05 PROCEDURE — 1101F PT FALLS ASSESS-DOCD LE1/YR: CPT | Performed by: PHYSICIAN ASSISTANT

## 2019-12-05 PROCEDURE — 99214 OFFICE O/P EST MOD 30 MIN: CPT | Performed by: PHYSICIAN ASSISTANT

## 2019-12-05 PROCEDURE — 1170F FXNL STATUS ASSESSED: CPT | Performed by: PHYSICIAN ASSISTANT

## 2019-12-05 PROCEDURE — 1160F RVW MEDS BY RX/DR IN RCRD: CPT | Performed by: PHYSICIAN ASSISTANT

## 2019-12-05 PROCEDURE — 1036F TOBACCO NON-USER: CPT | Performed by: PHYSICIAN ASSISTANT

## 2019-12-05 PROCEDURE — 3725F SCREEN DEPRESSION PERFORMED: CPT | Performed by: PHYSICIAN ASSISTANT

## 2019-12-05 PROCEDURE — G0439 PPPS, SUBSEQ VISIT: HCPCS | Performed by: PHYSICIAN ASSISTANT

## 2019-12-05 PROCEDURE — 3008F BODY MASS INDEX DOCD: CPT | Performed by: PHYSICIAN ASSISTANT

## 2019-12-05 RX ORDER — ONDANSETRON 4 MG/1
4 TABLET, ORALLY DISINTEGRATING ORAL EVERY 6 HOURS PRN
Qty: 10 TABLET | Refills: 0 | Status: SHIPPED | OUTPATIENT
Start: 2019-12-05 | End: 2020-12-07

## 2019-12-05 RX ORDER — FLUTICASONE PROPIONATE 50 MCG
2 SPRAY, SUSPENSION (ML) NASAL DAILY
Qty: 16 G | Refills: 0 | Status: SHIPPED | OUTPATIENT
Start: 2019-12-05 | End: 2020-11-18

## 2019-12-05 NOTE — PATIENT INSTRUCTIONS
Medicare Preventive Visit Patient Instructions  Thank you for completing your Welcome to Medicare Visit or Medicare Annual Wellness Visit today  Your next wellness visit will be due in one year (12/5/2020)  The screening/preventive services that you may require over the next 5-10 years are detailed below  Some tests may not apply to you based off risk factors and/or age  Screening tests ordered at today's visit but not completed yet may show as past due  Also, please note that scanned in results may not display below  Preventive Screenings:  Service Recommendations Previous Testing/Comments   Colorectal Cancer Screening  * Colonoscopy    * Fecal Occult Blood Test (FOBT)/Fecal Immunochemical Test (FIT)  * Fecal DNA/Cologuard Test  * Flexible Sigmoidoscopy Age: 54-65 years old   Colonoscopy: every 10 years (may be performed more frequently if at higher risk)  OR  FOBT/FIT: every 1 year  OR  Cologuard: every 3 years  OR  Sigmoidoscopy: every 5 years  Screening may be recommended earlier than age 48 if at higher risk for colorectal cancer  Also, an individualized decision between you and your healthcare provider will decide whether screening between the ages of 74-80 would be appropriate  Colonoscopy: Not on file  FOBT/FIT: Not on file  Cologuard: Not on file  Sigmoidoscopy: Not on file         Breast Cancer Screening Age: 36 years old  Frequency: every 1-2 years  Not required if history of left and right mastectomy Mammogram: 03/02/2017       Cervical Cancer Screening Between the ages of 21-29, pap smear recommended once every 3 years  Between the ages of 33-67, can perform pap smear with HPV co-testing every 5 years     Recommendations may differ for women with a history of total hysterectomy, cervical cancer, or abnormal pap smears in past  Pap Smear: Not on file    Screening Not Indicated   Hepatitis C Screening Once for adults born between [facility]  More frequently in patients at high risk for Hepatitis C Hep C Antibody: 07/09/2018    Screening Current   Diabetes Screening 1-2 times per year if you're at risk for diabetes or have pre-diabetes Fasting glucose: No results in last 5 years   A1C: No results in last 5 years    Screening Current   Cholesterol Screening Once every 5 years if you don't have a lipid disorder  May order more often based on risk factors  Lipid panel: 04/01/2019    Screening Not Indicated  History Lipid Disorder     Other Preventive Screenings Covered by Medicare:  1  Abdominal Aortic Aneurysm (AAA) Screening: covered once if your at risk  You're considered to be at risk if you have a family history of AAA  2  Lung Cancer Screening: covers low dose CT scan once per year if you meet all of the following conditions: (1) Age 50-69; (2) No signs or symptoms of lung cancer; (3) Current smoker or have quit smoking within the last 15 years; (4) You have a tobacco smoking history of at least 30 pack years (packs per day multiplied by number of years you smoked); (5) You get a written order from a healthcare provider  3  Glaucoma Screening: covered annually if you're considered high risk: (1) You have diabetes OR (2) Family history of glaucoma OR (3)  aged 48 and older OR (3)  American aged 72 and older  3  Osteoporosis Screening: covered every 2 years if you meet one of the following conditions: (1) You're estrogen deficient and at risk for osteoporosis based off medical history and other findings; (2) Have a vertebral abnormality; (3) On glucocorticoid therapy for more than 3 months; (4) Have primary hyperparathyroidism; (5) On osteoporosis medications and need to assess response to drug therapy  · Last bone density test (DXA Scan): 03/01/2017  5  HIV Screening: covered annually if you're between the age of 12-76  Also covered annually if you are younger than 13 and older than 72 with risk factors for HIV infection   For pregnant patients, it is covered up to 3 times per pregnancy  Immunizations:  Immunization Recommendations   Influenza Vaccine Annual influenza vaccination during flu season is recommended for all persons aged >= 6 months who do not have contraindications   Pneumococcal Vaccine (Prevnar and Pneumovax)  * Prevnar = PCV13  * Pneumovax = PPSV23   Adults 25-60 years old: 1-3 doses may be recommended based on certain risk factors  Adults 72 years old: Prevnar (PCV13) vaccine recommended followed by Pneumovax (PPSV23) vaccine  If already received PPSV23 since turning 65, then PCV13 recommended at least one year after PPSV23 dose  Hepatitis B Vaccine 3 dose series if at intermediate or high risk (ex: diabetes, end stage renal disease, liver disease)   Tetanus (Td) Vaccine - COST NOT COVERED BY MEDICARE PART B Following completion of primary series, a booster dose should be given every 10 years to maintain immunity against tetanus  Td may also be given as tetanus wound prophylaxis  Tdap Vaccine - COST NOT COVERED BY MEDICARE PART B Recommended at least once for all adults  For pregnant patients, recommended with each pregnancy  Shingles Vaccine (Shingrix) - COST NOT COVERED BY MEDICARE PART B  2 shot series recommended in those aged 48 and above     Health Maintenance Due:      Topic Date Due    Cervical Cancer Screening  03/21/1971    CRC Screening: FOBTx3/FIT  03/21/2000    MAMMOGRAM  03/02/2018    DXA SCAN  03/01/2019    Hepatitis C Screening  Completed     Immunizations Due:      Topic Date Due    DTaP,Tdap,and Td Vaccines (2 - Td) 05/12/2019     Advance Directives   What are advance directives? Advance directives are legal documents that state your wishes and plans for medical care  These plans are made ahead of time in case you lose your ability to make decisions for yourself  Advance directives can apply to any medical decision, such as the treatments you want, and if you want to donate organs  What are the types of advance directives?   There are many types of advance directives, and each state has rules about how to use them  You may choose a combination of any of the following:  · Living will: This is a written record of the treatment you want  You can also choose which treatments you do not want, which to limit, and which to stop at a certain time  This includes surgery, medicine, IV fluid, and tube feedings  · Durable power of  for healthcare [facility]): This is a written record that states who you want to make healthcare choices for you when you are unable to make them for yourself  This person, called a proxy, is usually a family member or a friend  You may choose more than 1 proxy  · Do not resuscitate (DNR) order:  A DNR order is used in case your heart stops beating or you stop breathing  It is a request not to have certain forms of treatment, such as CPR  A DNR order may be included in other types of advance directives  · Medical directive: This covers the care that you want if you are in a coma, near death, or unable to make decisions for yourself  You can list the treatments you want for each condition  Treatment may include pain medicine, surgery, blood transfusions, dialysis, IV or tube feedings, and a ventilator (breathing machine)  · Values history: This document has questions about your views, beliefs, and how you feel and think about life  This information can help others choose the care that you would choose  Why are advance directives important? An advance directive helps you control your care  Although spoken wishes may be used, it is better to have your wishes written down  Spoken wishes can be misunderstood, or not followed  Treatments may be given even if you do not want them  An advance directive may make it easier for your family to make difficult choices about your care        © Copyright [employer] 2018 Information is for End User's use only and may not be sold, redistributed or otherwise used for commercial purposes   All illustrations and images included in CareNotes® are the copyrighted property of A D A M , Inc  or Gayathri Simmons

## 2019-12-05 NOTE — ASSESSMENT & PLAN NOTE
Warts were treated curettage bilaterally  She was given a refill to restart using salicylic acid gel at bedtime  Encouraged to continue it until her skin lines are no longer interrupted in each area  Call with any problems or concerns

## 2019-12-05 NOTE — PROGRESS NOTES
Assessment and Plan:     Problem List Items Addressed This Visit        Endocrine    Nontoxic single thyroid nodule     Reviewed results of ultrasound from last year which states no further follow-up is needed since there is not been a change in size over the last 5 years  Relevant Orders    TSH, 3rd generation with Free T4 reflex       Musculoskeletal and Integument    Plantar wart of both feet     Warts were treated curettage bilaterally  She was given a refill to restart using salicylic acid gel at bedtime  Encouraged to continue it until her skin lines are no longer interrupted in each area  Call with any problems or concerns  Relevant Medications    salicylic acid 17 % gel       Other    Vitamin B12 deficiency     Recheck with labs as ordered today  Relevant Orders    CBC and differential    Vitamin B12    Vitamin D deficiency     Currently vitamin-D 1000 units daily  Recheck with labs ordered today  Relevant Orders    Vitamin D 25 hydroxy    Callus of foot     Removed completely with curettage today  Return as needed for removal          Anxiety     Improved since last year  Will continue to monitor  No longer feels that she requires for travel  Other Visit Diagnoses     Medicare annual wellness visit, subsequent    -  Primary    Post-menopause        Relevant Orders    DXA bone density spine hip and pelvis    Gastroenteritis        Given refill on Zofran to have in case needed while on upcoming trip  Relevant Medications    ondansetron (ZOFRAN-ODT) 4 mg disintegrating tablet    Screening for colorectal cancer        Relevant Orders    Occult Blood, Fecal Immunochemical    Need for Tdap vaccination        Relevant Medications    tetanus-diphtheria-acellular pertussis (BOOSTRIX) injection    Postnasal drip        Encouraged to try Flonase 2 sprays per nostril daily over the next few weeks    Can continue if beneficial     Relevant Medications    fluticasone (FLONASE) 50 mcg/act nasal spray    Diabetes mellitus screening        Relevant Orders    Comprehensive metabolic panel    Lipid screening        Relevant Orders    Lipid Panel with Direct LDL reflex           Preventive health issues were discussed with patient, and age appropriate screening tests were ordered as noted in patient's After Visit Summary  Personalized health advice and appropriate referrals for health education or preventive services given if needed, as noted in patient's After Visit Summary       History of Present Illness:     Patient presents for Medicare Annual Wellness visit    Patient Care Team:  Watson Stearns PA-C as PCP - General (Family Medicine)  Cholo Mcclendon MD     Problem List:     Patient Active Problem List   Diagnosis    Fatigue    Microscopic hematuria    Nontoxic single thyroid nodule    Osteoporosis    Positive PPD    Sciatica    Simple goiter    Vitamin B12 deficiency    Vitamin D deficiency    Callus of foot    Plantar wart of both feet    Dizziness    Paresthesia of left arm    Anxiety      Past Medical and Surgical History:     Past Medical History:   Diagnosis Date    Anemia     last assessed: 5/27/2016    Anxiety     last assessed: 11/16/2015    Esophageal reflux     resolved: 2/28/2017    External hemorrhoids     last assessed: 11/20/2012    Migraine     resolved after went through menopause     Past Surgical History:   Procedure Laterality Date    ESOPHAGOGASTRODUODENOSCOPY  2015    diagnostic; neg    FINE NEEDLE ASPIRATION  2013    thyroid nodule-negative for malignancy      Family History:     Family History   Problem Relation Age of Onset    Diabetes Mother         mellitus    Diabetes Father         mellitus    Arthritis Sister     Kidney disease Sister         kidneys are "shrinking" due to lots of protein    Thyroid disease Sister         disorder    Hypertension Sister     Breast cancer Sister     Breast cancer Family       Social History:     Social History     Socioeconomic History    Marital status: /Civil Union     Spouse name: None    Number of children: None    Years of education: None    Highest education level: None   Occupational History    None   Social Needs    Financial resource strain: None    Food insecurity:     Worry: None     Inability: None    Transportation needs:     Medical: None     Non-medical: None   Tobacco Use    Smoking status: Never Smoker    Smokeless tobacco: Never Used   Substance and Sexual Activity    Alcohol use: Never     Frequency: Never    Drug use: Never    Sexual activity: None   Lifestyle    Physical activity:     Days per week: None     Minutes per session: None    Stress: None   Relationships    Social connections:     Talks on phone: None     Gets together: None     Attends [employer] service: None     Active member of club or organization: None     Attends meetings of clubs or organizations: None     Relationship status: None    Intimate partner violence:     Fear of current or ex partner: None     Emotionally abused: None     Physically abused: None     Forced sexual activity: None   Other Topics Concern    None   Social History Narrative    Denied: history of caffeine use       Medications and Allergies:     Current Outpatient Medications   Medication Sig Dispense Refill    atorvastatin (LIPITOR) 20 mg tablet Take 1 tablet (20 mg total) by mouth daily 90 tablet 3    Calcium Carbonate (CALCIUM 600 PO) Take 1 capsule by mouth 2 (two) times a day      cholecalciferol (VITAMIN D3) 1,000 units tablet Take 1 tablet by mouth daily      ondansetron (ZOFRAN-ODT) 4 mg disintegrating tablet Take 1 tablet (4 mg total) by mouth every 6 (six) hours as needed for nausea or vomiting 10 tablet 0    fluticasone (FLONASE) 50 mcg/act nasal spray 2 sprays into each nostril daily 16 g 0    salicylic acid 17 % gel Apply topically daily at bedtime 15 g 2    tetanus-diphtheria-acellular pertussis (BOOSTRIX) injection Inject 0 5 mL into a muscle once for 1 dose 0 5 mL 0     No current facility-administered medications for this visit  No Known Allergies   Immunizations:     Immunization History   Administered Date(s) Administered    H1N1, All Formulations 10/29/2009    Hep A, adult 05/12/2009    Influenza Split High Dose Preservative Free IM 09/01/2016, 10/05/2017    Influenza TIV (IM) 12/19/2005, 11/08/2008, 10/11/2012, 09/26/2014, 09/16/2015    Influenza, high dose seasonal 0 5 mL 11/27/2018, 10/07/2019    Pneumococcal Conjugate 13-Valent 02/28/2017    Pneumococcal Polysaccharide PPV23 06/22/2018    Tdap 05/12/2009    Tuberculin Skin Test-PPD Intradermal 04/01/1998      Health Maintenance:         Topic Date Due    Cervical Cancer Screening  03/21/1971    CRC Screening: FOBTx3/FIT  03/21/2000    MAMMOGRAM  03/02/2018    DXA SCAN  03/01/2019    Hepatitis C Screening  Completed         Topic Date Due    DTaP,Tdap,and Td Vaccines (2 - Td) 05/12/2019      Medicare Health Risk Assessment:     /74 (BP Location: Left arm, Patient Position: Sitting, Cuff Size: Standard)   Pulse 72   Temp 98 1 °F (36 7 °C)   Ht 5' 1" (1 549 m)   Wt 60 kg (132 lb 3 2 oz)   BMI 24 98 kg/m²      Antonio is here for her Subsequent Wellness visit  Health Risk Assessment:   Patient rates overall health as good  Patient feels that their physical health rating is same  Eyesight was rated as same  Hearing was rated as same  Patient feels that their emotional and mental health rating is same  Pain experienced in the last 7 days has been some  Patient's pain rating has been 4/10  Depression Screening:   PHQ-2 Score: 0      Fall Risk Screening: In the past year, patient has experienced: no history of falling in past year      Urinary Incontinence Screening:   Patient has not leaked urine accidently in the last six months       Home Safety:  Patient does not have trouble with stairs inside or outside of their home  Patient has working smoke alarms and has no working carbon monoxide detector  Home safety hazards include: none  Nutrition:   Current diet is Regular and No Added Salt  Medications:   Patient is currently taking over-the-counter supplements  OTC medications include: see medication list  Patient is able to manage medications  Activities of Daily Living (ADLs)/Instrumental Activities of Daily Living (IADLs):   Walk and transfer into and out of bed and chair?: Yes  Dress and groom yourself?: Yes    Bathe or shower yourself?: Yes    Feed yourself?  Yes  Do your laundry/housekeeping?: Yes  Manage your money, pay your bills and track your expenses?: Yes  Make your own meals?: Yes    Do your own shopping?: Yes    Previous Hospitalizations:   Any hospitalizations or ED visits within the last 12 months?: No      Advance Care Planning:   Living will: No    Durable POA for healthcare: No    Advanced directive: No      PREVENTIVE SCREENINGS      Cardiovascular Screening:    General: Screening Not Indicated and History Lipid Disorder      Diabetes Screening:     General: Screening Current      Colorectal Cancer Screening:       Due for: FOBT/FIT      Breast Cancer Screening:       Due for: Mammogram        Cervical Cancer Screening:    General: Screening Not Indicated      Osteoporosis Screening:    General: Screening Not Indicated and History Osteoporosis    Due for: DXA Axial      Abdominal Aortic Aneurysm (AAA) Screening:        General: Screening Not Indicated      Lung Cancer Screening:     General: Screening Not Indicated      Hepatitis C Screening:    General: Screening Current      Marshall Chin PA-C

## 2019-12-05 NOTE — ASSESSMENT & PLAN NOTE
Reviewed results of ultrasound from last year which states no further follow-up is needed since there is not been a change in size over the last 5 years

## 2019-12-17 DIAGNOSIS — B07.0 PLANTAR WART OF BOTH FEET: ICD-10-CM

## 2019-12-17 NOTE — TELEPHONE ENCOUNTER
Pt's  call to let you know that [facility] is on backorder on Salicylic acid gel, they would like to have a new order sent to [facility]   Thanks  Called [facility], rx cancelled

## 2020-02-05 ENCOUNTER — HOSPITAL ENCOUNTER (OUTPATIENT)
Dept: MAMMOGRAPHY | Facility: MEDICAL CENTER | Age: 70
Discharge: HOME | End: 2020-02-05
Payer: COMMERCIAL

## 2020-02-05 ENCOUNTER — TRANSCRIBE ORDERS (OUTPATIENT)
Dept: ADMINISTRATIVE | Facility: HOSPITAL | Age: 70
End: 2020-02-05

## 2020-02-05 VITALS — WEIGHT: 132 LBS | BODY MASS INDEX: 24.92 KG/M2 | HEIGHT: 61 IN

## 2020-02-05 DIAGNOSIS — Z12.31 ENCOUNTER FOR SCREENING MAMMOGRAM FOR BREAST CANCER: ICD-10-CM

## 2020-02-05 PROCEDURE — 77067 SCR MAMMO BI INCL CAD: CPT

## 2020-02-07 ENCOUNTER — OFFICE VISIT (OUTPATIENT)
Dept: FAMILY MEDICINE CLINIC | Facility: CLINIC | Age: 70
End: 2020-02-07
Payer: COMMERCIAL

## 2020-02-07 ENCOUNTER — HOSPITAL ENCOUNTER (OUTPATIENT)
Dept: BONE DENSITY | Facility: MEDICAL CENTER | Age: 70
Discharge: HOME | End: 2020-02-07
Payer: COMMERCIAL

## 2020-02-07 VITALS
HEIGHT: 61 IN | HEART RATE: 66 BPM | DIASTOLIC BLOOD PRESSURE: 70 MMHG | SYSTOLIC BLOOD PRESSURE: 96 MMHG | WEIGHT: 132 LBS | OXYGEN SATURATION: 98 % | BODY MASS INDEX: 24.92 KG/M2

## 2020-02-07 DIAGNOSIS — G89.29 HEEL PAIN, CHRONIC, LEFT: Primary | ICD-10-CM

## 2020-02-07 DIAGNOSIS — Z78.0 POST-MENOPAUSE: ICD-10-CM

## 2020-02-07 DIAGNOSIS — M79.672 HEEL PAIN, CHRONIC, LEFT: Primary | ICD-10-CM

## 2020-02-07 PROCEDURE — 1036F TOBACCO NON-USER: CPT | Performed by: FAMILY MEDICINE

## 2020-02-07 PROCEDURE — 77080 DXA BONE DENSITY AXIAL: CPT

## 2020-02-07 PROCEDURE — 1160F RVW MEDS BY RX/DR IN RCRD: CPT | Performed by: FAMILY MEDICINE

## 2020-02-07 PROCEDURE — 4040F PNEUMOC VAC/ADMIN/RCVD: CPT | Performed by: FAMILY MEDICINE

## 2020-02-07 PROCEDURE — 3008F BODY MASS INDEX DOCD: CPT | Performed by: FAMILY MEDICINE

## 2020-02-07 PROCEDURE — 99213 OFFICE O/P EST LOW 20 MIN: CPT | Performed by: FAMILY MEDICINE

## 2020-02-07 NOTE — PROGRESS NOTES
FAMILY PRACTICE OFFICE VISIT  [facility]  [id number]   [address], [address]      NAME: Ashely Shaw  AGE: 71 y o  SEX: female  : 1950   MRN: [id number]    DATE: 2020  TIME: 12:27 PM    Assessment and Plan     Problem List Items Addressed This Visit        Other    Heel pain, chronic, left - Primary          Patient Instructions   Continues with left heel pain, now present for about 2 months despite conservative treatment with using stretching, rolling foot on iced water bottle, avoiding going barefoot, using sneakers with heel insert  Would like her to see Podiatry, may require injection  also has plantar warts with callus formation        Chief Complaint     Chief Complaint   Patient presents with    Foot Pain       History of Present Illness   Antonio Ladd is a 71y o -year-old female who I am seeing for the 1st time in a few years, she had been seen here by Kristel Sheldon in December, continues with left heel pain, very bothersome, has been using questioning, avoiding going barefoot, icing with frozen water bottle, has heel/shoe inserts    Also has warts      Review of Systems   Review of Systems   Constitutional:        No other new complaints       Active Problem List     Patient Active Problem List   Diagnosis    Fatigue    Microscopic hematuria    Nontoxic single thyroid nodule    Osteoporosis    Positive PPD    Sciatica    Simple goiter    Vitamin B12 deficiency    Vitamin D deficiency    Callus of foot    Plantar wart of both feet    Dizziness    Paresthesia of left arm    Anxiety    Heel pain, chronic, left       Past Medical History:  Reviewed    Past Surgical History:  Reviewed    Family History:  Reviewed    Social History:  Reviewed    Objective     Vitals:    20 1144   BP: 96/70   BP Location: Left arm   Patient Position: Sitting   Cuff Size: Standard   Pulse: 66   SpO2: 98% Weight: 59 9 kg (132 lb)   Height: 5' 1" (1 549 m)     Body mass index is 24 94 kg/m²  BP Readings from Last 3 Encounters:   02/07/20 96/70   12/05/19 130/74   04/16/19 132/66       Wt Readings from Last 3 Encounters:   02/07/20 59 9 kg (132 lb)   02/05/20 59 9 kg (132 lb)   12/05/19 60 kg (132 lb 3 2 oz)       Physical Exam   Constitutional:   Pleasant female seated no acute distress, favors left heel with ambulation   Musculoskeletal:   Tender left heel, no Achilles tenderness, no calf tenderness, no edema  Pulses intact, no redness or warmth  Does have plantar warts/callus also       ALLERGIES:  No Known Allergies    Current Medications     Current Outpatient Medications   Medication Sig Dispense Refill    atorvastatin (LIPITOR) 20 mg tablet Take 1 tablet (20 mg total) by mouth daily 90 tablet 3    Calcium Carbonate (CALCIUM 600 PO) Take 1 capsule by mouth 2 (two) times a day      cholecalciferol (VITAMIN D3) 1,000 units tablet Take 1 tablet by mouth daily      fluticasone (FLONASE) 50 mcg/act nasal spray 2 sprays into each nostril daily 16 g 0    ondansetron (ZOFRAN-ODT) 4 mg disintegrating tablet Take 1 tablet (4 mg total) by mouth every 6 (six) hours as needed for nausea or vomiting 10 tablet 0    salicylic acid 17 % gel Apply topically daily at bedtime 15 g 2     No current facility-administered medications for this visit  No orders of the defined types were placed in this encounter          Radha Jesus DO

## 2020-02-07 NOTE — PATIENT INSTRUCTIONS
Continues with left heel pain, now present for about 2 months despite conservative treatment with using stretching, rolling foot on iced water bottle, avoiding going barefoot, using sneakers with heel insert  Would like her to see Podiatry, may require injection       also has plantar warts with callus formation

## 2020-02-12 ENCOUNTER — TELEPHONE (OUTPATIENT)
Dept: FAMILY MEDICINE CLINIC | Facility: CLINIC | Age: 70
End: 2020-02-12

## 2020-02-12 DIAGNOSIS — M85.852 OSTEOPENIA OF BOTH HIPS: ICD-10-CM

## 2020-02-12 DIAGNOSIS — M81.0 OSTEOPOROSIS OF LUMBAR SPINE: Primary | ICD-10-CM

## 2020-02-12 DIAGNOSIS — M85.851 OSTEOPENIA OF BOTH HIPS: ICD-10-CM

## 2020-02-12 DIAGNOSIS — M81.0 OSTEOPOROSIS, UNSPECIFIED OSTEOPOROSIS TYPE, UNSPECIFIED PATHOLOGICAL FRACTURE PRESENCE: Primary | ICD-10-CM

## 2020-02-12 RX ORDER — PHENOL 1.4 %
600 AEROSOL, SPRAY (ML) MUCOUS MEMBRANE 2 TIMES DAILY WITH MEALS
Start: 2020-02-12

## 2020-02-12 NOTE — TELEPHONE ENCOUNTER
----- Message from Erica Mcguire PA-C sent at 2/11/2020  5:27 PM EST -----  Please let the patient know that her DEXA showed significant osteoporosis in the lumbar spine as well as osteopenia in the total hip/femoral neck  Please provide her a slip to check her PTH in addition to the other labs recently ordered  Treatment was recommended with Prolia and then Reclast  Please provide referral to Endocrinology  Please encourage patient to get at least 1200 mg of calcium and 800-1000 units of vitamin D daily between diet and supplementation  Please encourage regular weight bearing exercise and avoidance of smoking as well as excessive alcohol intake  The patient should plan on repeating the DEXA scan in 2 years

## 2020-02-13 NOTE — TELEPHONE ENCOUNTER
Patient also needed PTH ordered  I placed the order  Please make sure that she gets a copy of that as well

## 2020-03-02 ENCOUNTER — CONSULT (OUTPATIENT)
Dept: ENDOCRINOLOGY | Facility: CLINIC | Age: 70
End: 2020-03-02
Payer: COMMERCIAL

## 2020-03-02 VITALS
WEIGHT: 133.4 LBS | SYSTOLIC BLOOD PRESSURE: 138 MMHG | HEIGHT: 62 IN | HEART RATE: 64 BPM | BODY MASS INDEX: 24.55 KG/M2 | DIASTOLIC BLOOD PRESSURE: 84 MMHG

## 2020-03-02 DIAGNOSIS — M81.0 OSTEOPOROSIS, UNSPECIFIED OSTEOPOROSIS TYPE, UNSPECIFIED PATHOLOGICAL FRACTURE PRESENCE: Primary | ICD-10-CM

## 2020-03-02 DIAGNOSIS — M85.851 OSTEOPENIA OF BOTH HIPS: ICD-10-CM

## 2020-03-02 DIAGNOSIS — M81.0 OSTEOPOROSIS OF LUMBAR SPINE: ICD-10-CM

## 2020-03-02 DIAGNOSIS — M85.852 OSTEOPENIA OF BOTH HIPS: ICD-10-CM

## 2020-03-02 PROCEDURE — 1160F RVW MEDS BY RX/DR IN RCRD: CPT | Performed by: INTERNAL MEDICINE

## 2020-03-02 PROCEDURE — 3008F BODY MASS INDEX DOCD: CPT | Performed by: INTERNAL MEDICINE

## 2020-03-02 PROCEDURE — 99204 OFFICE O/P NEW MOD 45 MIN: CPT | Performed by: INTERNAL MEDICINE

## 2020-03-02 RX ORDER — ALENDRONATE SODIUM 70 MG/1
70 TABLET ORAL
Qty: 4 TABLET | Refills: 6 | Status: SHIPPED | OUTPATIENT
Start: 2020-03-02 | End: 2020-12-07

## 2020-03-02 NOTE — PATIENT INSTRUCTIONS
Alendronate (By mouth)   Alendronate ([id number])  Treats or prevents osteoporosis  Also treats Paget disease of the bone  Brand Name(s): Binosto Fosamax   There may be other brand names for this medicine  When This Medicine Should Not Be Used: This medicine is not right for everyone  Do not use it if you had an allergic reaction to alendronate, or if you have esophagus problems or trouble swallowing  Do not use it if you cannot stand or sit upright for at least 30 minutes after you take the medicine  How to Use This Medicine:   Liquid, Tablet, Fizzy Tablet  · Take this medicine in the morning on an empty stomach  Follow the directions exactly to lower the risk of esophagus problems  · Sit or stand while you take this medicine  Do not lie down for at least 30 minutes after you take the medicine, and do not lie down until after you have eaten  · Use plain water to take your medicine  The medicine may not work as well if you use other liquids  ¨ Tablet: Swallow whole with 6 to 8 ounces of water  Do not chew or suck on the tablet  ¨ Oral liquid: Measure the oral liquid medicine with a marked measuring spoon, oral syringe, or medicine cup  Drink at least 2 ounces (1/4 cup) of water after you take the liquid medicine  ¨ Effervescent tablet: Dissolve the tablet in 4 ounces of room temperature water  Wait at least 5 minutes after the bubbling stops  Then stir the solution for 10 seconds and drink it  · Wait at least 30 minutes after you take this medicine before you eat or drink or take any other medicine  This will help your body absorb the medicine  · This medicine should come with a Medication Guide  Ask your pharmacist for a copy if you do not have one  · Missed dose: Take a dose the next morning  Do not take 2 tablets on the same day  Then go back to your regular schedule  · Store the medicine in a closed container at room temperature, away from heat, moisture, and direct light   Keep the effervescent tablets in the blister pack until you are ready to use them  Drugs and Foods to Avoid:   Ask your doctor or pharmacist before using any other medicine, including over-the-counter medicines, vitamins, and herbal products  · Some medicines can affect how alendronate works  Tell your doctor if you are using any of the following:   ¨ Cancer medicines  ¨ NSAID pain or arthritis medicine (including aspirin, celecoxib, diclofenac, ibuprofen, naproxen)  ¨ Steroid medicine (including as hydrocortisone, methylprednisolone, prednisone, prednisolone, dexamethasone)  · Take alendronate at least 30 minutes before you take any other oral medicine, including aluminum, magnesium, iron, or calcium supplements, or antacids  Warnings While Using This Medicine:   · Tell your doctor if you are pregnant or breastfeeding, or if you have kidney disease, heartburn, anemia, blood clotting problems, ulcers or other stomach or bowel problems, a vitamin D deficiency, or a history of cancer  Tell your doctor if you have dental problems or if you wear dentures  Also tell your doctor if you smoke or drink alcohol  · This medicine may cause the following problems:   ¨ Damage to your esophagus  ¨ Increased risk for a thigh bone fracture  ¨ Low calcium levels  · Tell any doctor or dentist who treats you that you are using this medicine  This medicine may cause jaw problems, especially if you have a tooth pulled or other dental work  · The effervescent tablet contains sodium  Tell your doctor if you are on a low-salt diet  · Your doctor will check your progress and the effects of this medicine at regular visits  Keep all appointments  · Keep all medicine out of the reach of children  Never share your medicine with anyone    Possible Side Effects While Using This Medicine:   Call your doctor right away if you notice any of these side effects:  · Allergic reaction: Itching or hives, swelling in your face or hands, swelling or tingling in your mouth or throat, chest tightness, trouble breathing  · Blistering, peeling, red skin rash  · Chest pain, new or worsening heartburn, or a burning feeling in your throat  · Muscle spasms or twitching, tingling or numbness in your fingers, toes, or around your mouth  · Pain or difficulty when swallowing  · Pain, swelling, numbness, or a heavy feeling in your mouth or jaw, loose teeth, or other tooth problems  · Severe bone, joint, or muscle pain  · Unusual pain in your thigh, groin, or hip  If you notice these less serious side effects, talk with your doctor:   · Mild stomach pain or upset  If you notice other side effects that you think are caused by this medicine, tell your doctor  Call your doctor for medical advice about side effects  You may report side effects to FDA at 2-785-FDA-3414  © 2017 [employer] Information is for End User's use only and may not be sold, redistributed or otherwise used for commercial purposes  The above information is an  only  It is not intended as medical advice for individual conditions or treatments  Talk to your doctor, nurse or pharmacist before following any medical regimen to see if it is safe and effective for you

## 2020-03-02 NOTE — PROGRESS NOTES
Surinder Haque 71 y o  female MRN: [id number]    Encounter: [id number]      Assessment/Plan     Problem List Items Addressed This Visit        Musculoskeletal and Integument    Osteoporosis - Primary     DEXA scan reviewed with the patient-shows osteoporosis-patient counseled on increased risk of fracture  Counseled on fall prevention  For now continue calcium and vitamin-D supplementations  She should be on pharmacological therapy to improve bone mineral density and decrease  risk of fractures  I have discussed either oral or IV bisphosphonates as well as Prolia  She has in start oral bisphosphonate once a week  Side effects discussed  Osteoporosis of lumbar spine    Relevant Medications    alendronate (FOSAMAX) 70 mg tablet      Other Visit Diagnoses     Osteopenia of both hips            CC:   Osteoporosis    History of Present Illness     HPI:  77-year-old female referred here for evaluation osteoporosis-  She has No history of fragility fractures   Takes Calcium and Vitamin D twice daily   No falls  Steady on feet  Does not use any assistive devices    Not  On chronic steroids anticonvulsants  No history of parental  hip fracture     Went through menopause around 52 - not on estrogen     Review of Systems   Constitutional: Positive for fatigue  Negative for unexpected weight change  Eyes: Negative for visual disturbance  Respiratory: Negative for cough and shortness of breath  Cardiovascular: Negative for palpitations and leg swelling  Gastrointestinal: Negative for constipation, diarrhea, nausea and vomiting  Musculoskeletal: Positive for arthralgias and myalgias  Negative for gait problem  Psychiatric/Behavioral: Positive for sleep disturbance  All other systems reviewed and are negative        Historical Information   Past Medical History:   Diagnosis Date    Anemia     last assessed: 5/27/2016    Anxiety     last assessed: 11/16/2015    Esophageal reflux     resolved: 2/28/2017    External hemorrhoids     last assessed: 11/20/2012    Migraine     resolved after went through menopause     Past Surgical History:   Procedure Laterality Date    ESOPHAGOGASTRODUODENOSCOPY  2015    diagnostic; neg    FINE NEEDLE ASPIRATION  2013    thyroid nodule-negative for malignancy     Social History   Social History     Substance and Sexual Activity   Alcohol Use Never    Frequency: Never     Social History     Substance and Sexual Activity   Drug Use Never     Social History     Tobacco Use   Smoking Status Never Smoker   Smokeless Tobacco Never Used     Family History:   Family History   Problem Relation Age of Onset    Diabetes Mother         mellitus    Diabetes Father         mellitus    Arthritis Sister     Kidney disease Sister         kidneys are "shrinking" due to lots of protein    Thyroid disease Sister         disorder    Hypertension Sister     Breast cancer Sister     Breast cancer Family     No Known Problems Daughter     No Known Problems Maternal Grandmother     No Known Problems Maternal Grandfather     No Known Problems Paternal Grandmother     No Known Problems Paternal Grandfather     No Known Problems Sister     No Known Problems Sister     Osteoporosis Sister     No Known Problems Sister     No Known Problems Sister     No Known Problems Daughter     No Known Problems Maternal Aunt     No Known Problems Paternal Aunt        Meds/Allergies   Current Outpatient Medications   Medication Sig Dispense Refill    atorvastatin (LIPITOR) 20 mg tablet Take 1 tablet (20 mg total) by mouth daily 90 tablet 3    calcium carbonate (CALCIUM 600) 600 MG tablet Take 1 tablet (600 mg total) by mouth 2 (two) times a day with meals      cholecalciferol (VITAMIN D3) 1,000 units tablet Take 1 tablet by mouth daily      salicylic acid 17 % gel Apply topically daily at bedtime 15 g 2    alendronate (FOSAMAX) 70 mg tablet Take 1 tablet (70 mg total) by mouth every 7 days 4 tablet 6    fluticasone (FLONASE) 50 mcg/act nasal spray 2 sprays into each nostril daily (Patient not taking: Reported on 3/2/2020) 16 g 0    ondansetron (ZOFRAN-ODT) 4 mg disintegrating tablet Take 1 tablet (4 mg total) by mouth every 6 (six) hours as needed for nausea or vomiting (Patient not taking: Reported on 3/2/2020) 10 tablet 0     No current facility-administered medications for this visit  No Known Allergies    Objective   Vitals: Blood pressure 138/84, pulse 64, height 5' 2" (1 575 m), weight 60 5 kg (133 lb 6 4 oz)  Physical Exam   Constitutional: She is oriented to person, place, and time  She appears well-developed and well-nourished  No distress  HENT:   Head: Normocephalic and atraumatic  Eyes: EOM are normal  No scleral icterus  Neck: Normal range of motion  Neck supple  No thyromegaly present  Cardiovascular: Normal rate, regular rhythm and normal heart sounds  No murmur heard  Pulmonary/Chest: Effort normal and breath sounds normal  No respiratory distress  She has no wheezes  She has no rales  Abdominal: Soft  Bowel sounds are normal  She exhibits no distension  There is no tenderness  There is no guarding  Musculoskeletal: Normal range of motion  She exhibits no edema or deformity  Lymphadenopathy:     She has no cervical adenopathy  Neurological: She is alert and oriented to person, place, and time  Skin: Skin is warm and dry  Psychiatric: She has a normal mood and affect  Her behavior is normal  Judgment and thought content normal    Vitals reviewed  The history was obtained from the review of the chart, patient and family      Lab Results:   Lab Results   Component Value Date/Time    Potassium 4 0 04/01/2019 08:12 AM    Chloride 106 04/01/2019 08:12 AM    CO2 30 04/01/2019 08:12 AM    BUN 14 04/01/2019 08:12 AM    Creatinine 0 62 04/01/2019 08:12 AM    SL AMB CALCIUM 9 1 04/01/2019 08:12 AM         Imaging Studies:            Results for orders placed during the hospital encounter of 02/07/20   DXA bone density spine hip and pelvis    Impression 1  Based on the [facility] classification, this study identifies a diagnosis of osteoporosis, serious at the spine area and the patient is considered at increased risk for fracture  2  A daily intake of calcium of at least 1200 mg and vitamin D, 800-1000 IU, as well as weight bearing and muscle strengthening exercise, fall prevention and avoidance of tobacco and excessive alcohol intake as basic preventive measures are recommended  3  Repeat DXA scan on the same equipment in 18-24 months as clinically indicated  The 10 year risk of hip fracture is 1 9%, with the 10 year risk of major osteoporotic fracture being 11%, as calculated by the [facility] fracture risk assessment tool (FRAX)  The current NOF guidelines recommend treating patients with FRAX 10 year risk score   of >3% for hip fracture and >20% for major osteoporotic fracture  WHO CLASSIFICATION:  Normal (a T-score of -1 0 or higher)  Low bone mineral density (a T-score of less than -1 0 but higher than -2 5)  Osteoporosis (a T-score of -2 5 or less)  Severe osteoporosis (a T-score of -2 5 or less with a fragility fracture)    Thank you for allowing us the opportunity to participate in your patient care  The expanded DEXA report will no longer be arriving in your mail  If you desire to view the full report please contact [facility] or access the PACS system          Workstation performed: [id number]                  I have personally reviewed pertinent reports  Portions of the record may have been created with voice recognition software  Occasional wrong word or "sound a like" substitutions may have occurred due to the inherent limitations of voice recognition software  Read the chart carefully and recognize, using context, where substitutions have occurred

## 2020-03-10 ENCOUNTER — TELEPHONE (OUTPATIENT)
Dept: ENDOCRINOLOGY | Facility: CLINIC | Age: 70
End: 2020-03-10

## 2020-03-10 NOTE — TELEPHONE ENCOUNTER
called concern that after wife took Fosamax, she started experiencing abdominal pain and vomiting  Please advise

## 2020-03-10 NOTE — TELEPHONE ENCOUNTER
Spoke with  and he would like literature on reclast before scheduling this infusion, I will mail out information on this IV infusion

## 2020-03-10 NOTE — TELEPHONE ENCOUNTER
After the first dose ? How long after ?  Would suggest switching to IV infusion once a year if she is having GI upset with oral

## 2020-03-13 DIAGNOSIS — E78.5 HYPERLIPIDEMIA, UNSPECIFIED HYPERLIPIDEMIA TYPE: ICD-10-CM

## 2020-03-14 RX ORDER — ATORVASTATIN CALCIUM 20 MG/1
TABLET, FILM COATED ORAL
Qty: 90 TABLET | Refills: 1 | Status: SHIPPED | OUTPATIENT
Start: 2020-03-14 | End: 2020-03-20

## 2020-03-18 DIAGNOSIS — E78.5 HYPERLIPIDEMIA, UNSPECIFIED HYPERLIPIDEMIA TYPE: ICD-10-CM

## 2020-03-20 RX ORDER — ATORVASTATIN CALCIUM 20 MG/1
TABLET, FILM COATED ORAL
Qty: 90 TABLET | Refills: 1 | Status: SHIPPED | OUTPATIENT
Start: 2020-03-20 | End: 2020-09-08 | Stop reason: SDUPTHER

## 2020-09-08 DIAGNOSIS — E78.5 HYPERLIPIDEMIA, UNSPECIFIED HYPERLIPIDEMIA TYPE: ICD-10-CM

## 2020-09-09 RX ORDER — ATORVASTATIN CALCIUM 20 MG/1
20 TABLET, FILM COATED ORAL DAILY
Qty: 90 TABLET | Refills: 0 | Status: SHIPPED | OUTPATIENT
Start: 2020-09-09 | End: 2020-12-21

## 2020-09-09 NOTE — TELEPHONE ENCOUNTER
Approved, but she will need visit scheduled prior to next refills  Please call her to schedule an appointment for physical in December

## 2020-10-07 ENCOUNTER — CLINICAL SUPPORT (OUTPATIENT)
Dept: FAMILY MEDICINE CLINIC | Facility: CLINIC | Age: 70
End: 2020-10-07
Payer: COMMERCIAL

## 2020-10-07 VITALS — TEMPERATURE: 97.8 F

## 2020-10-07 DIAGNOSIS — Z23 NEED FOR INFLUENZA VACCINATION: Primary | ICD-10-CM

## 2020-10-07 PROCEDURE — G0008 ADMIN INFLUENZA VIRUS VAC: HCPCS

## 2020-10-07 PROCEDURE — 90662 IIV NO PRSV INCREASED AG IM: CPT

## 2020-10-21 LAB
25(OH)D3 SERPL-MCNC: 39 NG/ML (ref 30–100)
ALBUMIN SERPL-MCNC: 4.1 G/DL (ref 3.6–5.1)
ALBUMIN/GLOB SERPL: 1.6 (CALC) (ref 1–2.5)
ALP SERPL-CCNC: 80 U/L (ref 37–153)
ALT SERPL-CCNC: 17 U/L (ref 6–29)
AST SERPL-CCNC: 15 U/L (ref 10–35)
BASOPHILS # BLD AUTO: 53 CELLS/UL (ref 0–200)
BASOPHILS NFR BLD AUTO: 0.9 %
BILIRUB SERPL-MCNC: 0.6 MG/DL (ref 0.2–1.2)
BUN SERPL-MCNC: 12 MG/DL (ref 7–25)
BUN/CREAT SERPL: NORMAL (CALC) (ref 6–22)
CALCIUM SERPL-MCNC: 9.2 MG/DL (ref 8.6–10.4)
CALCIUM SERPL-MCNC: 9.2 MG/DL (ref 8.6–10.4)
CHLORIDE SERPL-SCNC: 105 MMOL/L (ref 98–110)
CHOLEST SERPL-MCNC: 151 MG/DL
CHOLEST/HDLC SERPL: 2.7 (CALC)
CO2 SERPL-SCNC: 27 MMOL/L (ref 20–32)
CREAT SERPL-MCNC: 0.68 MG/DL (ref 0.6–0.93)
EOSINOPHIL # BLD AUTO: 124 CELLS/UL (ref 15–500)
EOSINOPHIL NFR BLD AUTO: 2.1 %
ERYTHROCYTE [DISTWIDTH] IN BLOOD BY AUTOMATED COUNT: 11.9 % (ref 11–15)
GLOBULIN SER CALC-MCNC: 2.6 G/DL (CALC) (ref 1.9–3.7)
GLUCOSE SERPL-MCNC: 93 MG/DL (ref 65–99)
HCT VFR BLD AUTO: 40.6 % (ref 35–45)
HDLC SERPL-MCNC: 56 MG/DL
HGB BLD-MCNC: 13.6 G/DL (ref 11.7–15.5)
LDLC SERPL CALC-MCNC: 79 MG/DL (CALC)
LYMPHOCYTES # BLD AUTO: 3044 CELLS/UL (ref 850–3900)
LYMPHOCYTES NFR BLD AUTO: 51.6 %
MCH RBC QN AUTO: 29.7 PG (ref 27–33)
MCHC RBC AUTO-ENTMCNC: 33.5 G/DL (ref 32–36)
MCV RBC AUTO: 88.6 FL (ref 80–100)
MONOCYTES # BLD AUTO: 395 CELLS/UL (ref 200–950)
MONOCYTES NFR BLD AUTO: 6.7 %
NEUTROPHILS # BLD AUTO: 2283 CELLS/UL (ref 1500–7800)
NEUTROPHILS NFR BLD AUTO: 38.7 %
NONHDLC SERPL-MCNC: 95 MG/DL (CALC)
PLATELET # BLD AUTO: 279 THOUSAND/UL (ref 140–400)
PMV BLD REES-ECKER: 10.7 FL (ref 7.5–12.5)
POTASSIUM SERPL-SCNC: 4 MMOL/L (ref 3.5–5.3)
PROT SERPL-MCNC: 6.7 G/DL (ref 6.1–8.1)
PTH-INTACT SERPL-MCNC: 50 PG/ML (ref 14–64)
RBC # BLD AUTO: 4.58 MILLION/UL (ref 3.8–5.1)
SL AMB EGFR AFRICAN AMERICAN: 103 ML/MIN/1.73M2
SL AMB EGFR NON AFRICAN AMERICAN: 89 ML/MIN/1.73M2
SODIUM SERPL-SCNC: 137 MMOL/L (ref 135–146)
TRIGL SERPL-MCNC: 82 MG/DL
TSH SERPL-ACNC: 1.51 MIU/L (ref 0.4–4.5)
VIT B12 SERPL-MCNC: 218 PG/ML (ref 200–1100)
WBC # BLD AUTO: 5.9 THOUSAND/UL (ref 3.8–10.8)

## 2020-11-04 ENCOUNTER — VBI (OUTPATIENT)
Dept: ADMINISTRATIVE | Facility: OTHER | Age: 70
End: 2020-11-04

## 2020-11-18 DIAGNOSIS — R09.82 POSTNASAL DRIP: ICD-10-CM

## 2020-11-18 RX ORDER — FLUTICASONE PROPIONATE 50 MCG
SPRAY, SUSPENSION (ML) NASAL
Qty: 16 G | Refills: 0 | Status: SHIPPED | OUTPATIENT
Start: 2020-11-18 | End: 2020-12-07

## 2020-12-07 ENCOUNTER — OFFICE VISIT (OUTPATIENT)
Dept: FAMILY MEDICINE CLINIC | Facility: CLINIC | Age: 70
End: 2020-12-07
Payer: COMMERCIAL

## 2020-12-07 VITALS
DIASTOLIC BLOOD PRESSURE: 80 MMHG | HEART RATE: 76 BPM | SYSTOLIC BLOOD PRESSURE: 122 MMHG | BODY MASS INDEX: 23.89 KG/M2 | OXYGEN SATURATION: 98 % | HEIGHT: 62 IN | TEMPERATURE: 98 F | WEIGHT: 129.8 LBS

## 2020-12-07 DIAGNOSIS — Z12.11 SCREENING FOR COLORECTAL CANCER: ICD-10-CM

## 2020-12-07 DIAGNOSIS — E04.1 NONTOXIC SINGLE THYROID NODULE: ICD-10-CM

## 2020-12-07 DIAGNOSIS — Z12.31 ENCOUNTER FOR SCREENING MAMMOGRAM FOR BREAST CANCER: ICD-10-CM

## 2020-12-07 DIAGNOSIS — Z23 NEED FOR TDAP VACCINATION: ICD-10-CM

## 2020-12-07 DIAGNOSIS — L84 CALLUS OF FOOT: ICD-10-CM

## 2020-12-07 DIAGNOSIS — M81.0 OSTEOPOROSIS OF LUMBAR SPINE: ICD-10-CM

## 2020-12-07 DIAGNOSIS — Z12.12 SCREENING FOR COLORECTAL CANCER: ICD-10-CM

## 2020-12-07 DIAGNOSIS — E53.8 VITAMIN B12 DEFICIENCY: ICD-10-CM

## 2020-12-07 DIAGNOSIS — Z00.00 MEDICARE ANNUAL WELLNESS VISIT, SUBSEQUENT: Primary | ICD-10-CM

## 2020-12-07 DIAGNOSIS — E78.5 HYPERLIPIDEMIA, UNSPECIFIED HYPERLIPIDEMIA TYPE: ICD-10-CM

## 2020-12-07 DIAGNOSIS — E55.9 VITAMIN D DEFICIENCY: ICD-10-CM

## 2020-12-07 PROCEDURE — 1170F FXNL STATUS ASSESSED: CPT | Performed by: PHYSICIAN ASSISTANT

## 2020-12-07 PROCEDURE — 99204 OFFICE O/P NEW MOD 45 MIN: CPT | Performed by: PHYSICIAN ASSISTANT

## 2020-12-07 PROCEDURE — T1015 CLINIC SERVICE: HCPCS | Performed by: PHYSICIAN ASSISTANT

## 2020-12-07 PROCEDURE — G0439 PPPS, SUBSEQ VISIT: HCPCS | Performed by: PHYSICIAN ASSISTANT

## 2020-12-07 PROCEDURE — 1036F TOBACCO NON-USER: CPT | Performed by: PHYSICIAN ASSISTANT

## 2020-12-07 PROCEDURE — 1160F RVW MEDS BY RX/DR IN RCRD: CPT | Performed by: PHYSICIAN ASSISTANT

## 2020-12-07 PROCEDURE — 3725F SCREEN DEPRESSION PERFORMED: CPT | Performed by: PHYSICIAN ASSISTANT

## 2020-12-07 PROCEDURE — 3008F BODY MASS INDEX DOCD: CPT | Performed by: PHYSICIAN ASSISTANT

## 2020-12-07 PROCEDURE — 1125F AMNT PAIN NOTED PAIN PRSNT: CPT | Performed by: PHYSICIAN ASSISTANT

## 2020-12-07 RX ORDER — TETANUS TOXOID, REDUCED DIPHTHERIA TOXOID AND ACELLULAR PERTUSSIS VACCINE, ADSORBED 5; 2.5; 8; 8; 2.5 [IU]/.5ML; [IU]/.5ML; UG/.5ML; UG/.5ML; UG/.5ML
0.5 SUSPENSION INTRAMUSCULAR ONCE
Qty: 0.5 ML | Refills: 0 | Status: SHIPPED | OUTPATIENT
Start: 2020-12-07 | End: 2020-12-07

## 2020-12-15 ENCOUNTER — VBI (OUTPATIENT)
Dept: ADMINISTRATIVE | Facility: OTHER | Age: 70
End: 2020-12-15

## 2020-12-21 DIAGNOSIS — E78.5 HYPERLIPIDEMIA, UNSPECIFIED HYPERLIPIDEMIA TYPE: ICD-10-CM

## 2020-12-22 DIAGNOSIS — E78.5 HYPERLIPIDEMIA, UNSPECIFIED HYPERLIPIDEMIA TYPE: ICD-10-CM

## 2020-12-22 RX ORDER — ATORVASTATIN CALCIUM 20 MG/1
TABLET, FILM COATED ORAL
Qty: 90 TABLET | Refills: 3 | Status: SHIPPED | OUTPATIENT
Start: 2020-12-22 | End: 2020-12-22 | Stop reason: SDUPTHER

## 2020-12-22 RX ORDER — ATORVASTATIN CALCIUM 20 MG/1
20 TABLET, FILM COATED ORAL DAILY
Qty: 90 TABLET | Refills: 3 | Status: SHIPPED | OUTPATIENT
Start: 2020-12-22 | End: 2021-12-20

## 2021-04-02 ENCOUNTER — TELEPHONE (OUTPATIENT)
Dept: FAMILY MEDICINE CLINIC | Facility: CLINIC | Age: 71
End: 2021-04-02

## 2021-04-02 NOTE — TELEPHONE ENCOUNTER
Patient's  called stating that patient's eye doctor Dr Ismael Holley needs history/physical for pre-op  In the message, it sounded like they just wanted a last visit faxed to [phone]; however, I wanted to confirm with the patient/ that they don't want something more recent, as patient has not been seen since 12/2020   Please ask patient/ if the physician wants her to have a pre-op consult and schedule accordingly if so

## 2021-05-20 NOTE — PROGRESS NOTES
Assessment :     E04 1 Nontoxic single thyroid nodule  I have evaluated the patient and found the condition to be: Stable  I have evaluated the patient and: Recommended continue with same treatment plan    M54 30 Sciatica  I have evaluated the patient and found the condition to be: Resolved  I have evaluated the patient and: Recommended continue with same treatment plan    M81 0 Osteoporosis  I have evaluated the patient and found the condition to be: Stable  I have evaluated the patient and: Recommended continue with same treatment plan    R53 83 Fatigue  I have evaluated the patient and found the condition to be: Stable  I have evaluated the patient and: Recommended continue with same treatment plan and Ordered additional services/ studies  The patient should continue treatment and follow-up with: repeat labs    E78 5 Hyperlipidemia  I have evaluated the patient and found the condition to be: Stable  I have evaluated the patient and: Ordered additional services/ studies and Recommended continue with same treatment plan  The patient should continue treatment and follow-up with: repeat labs    E53 8 Vitamin B12 deficiency  I have evaluated the patient and found the condition to be: Resolved  I have evaluated the patient and: Ordered additional services/ studies  The patient should continue treatment and follow-up with: repeat labs    E55 9 Vitamin D deficiency  I have evaluated the patient and found the condition to be: Stable  I have evaluated the patient and: Recommended continue with same treatment plan and Ordered additional services/ studies  The patient should continue treatment and follow-up with: repeat labs    F41 9 Anxiety  I have evaluated the patient and found the condition to be: Stable  I have evaluated the patient and: Recommended continue with same treatment plan          Surgical clearance: The patient is found to be at acceptable risk from a cardiovascular standpoint    Additional cardiac testing is not necessary  Preoperative testing reviewed includes EKG  Re-evaluation is needed if the patient should present with symptoms prior to surgery/ procedure  Surgical clearance will be faxed to Dr Alex Ortega  Subjective:     Patient ID: Christopher Child is a 70 y o  female     Chief Complaint   Patient presents with    Consult     eye surgery       Pre-Op Visit:  The patient is being seen today for preoperative visit  The procedure that is scheduled is cataract extraction  It is scheduled on 6/17/21 and 7/12/21  with Dr Miles Sparks  The indication for the surgery is decreased visual acuity  Surgical Risk Assessment:  Prior Anesthesia:  The patient confirms prior anesthesia and denies problems with anesthesia  Pertinent Past Medical History:  The patient  denies  diabetes,  angina,  CAD,  arrhythmia,  DVT,  pulmonary embolism,  chronic liver disease,  chronic kidney disease,  COPD,  asthma,  thyroid disease and  seizure disorder  Exercise Capacity:  The patient denies ability to walk 4 blocks without symptoms and denies ability to walk two flights of stairs without symptoms  Lifestyle Factors: The patient denies alcohol use, denies tobacco use, and denies drug use  Symptoms:  The patient confirm palpitations (rare and brief with laying down), but denies easy bruising,  chest pain,  cough,  dyspnea,  edema and  wheezing  Pertinent Family History:  The patient confirms denies family history of  ischemic heart diease,  adverse reaction to anesthesia,  aneurysm,  bleeding problems,  sudden early death and  stroke  Living Situation:  The patient's reports home is secure and denies any post-op concerns with current living situation  Review of Systems   Constitutional: Negative for chills and fever  HENT: Positive for sneezing  Negative for congestion, rhinorrhea and sore throat  Eyes: Negative for visual disturbance  Respiratory: Negative for cough, shortness of breath and wheezing  Cardiovascular: Positive for palpitations  Negative for chest pain and leg swelling  Gastrointestinal: Negative for abdominal pain, blood in stool, constipation, diarrhea, nausea and vomiting  Endocrine: Negative for polydipsia and polyuria  Genitourinary: Negative for dysuria and frequency  Musculoskeletal: Positive for back pain  Negative for arthralgias and myalgias  Skin: Negative for rash  Neurological: Positive for headaches  Negative for dizziness and syncope  Hematological: Does not bruise/bleed easily  Psychiatric/Behavioral: Negative for dysphoric mood  The patient is nervous/anxious (stress)  Objective:      /80   Pulse 71   Temp 97 8 °F (36 6 °C) (Temporal)   Ht 5' 2" (1 575 m)   Wt 56 2 kg (124 lb)   SpO2 100%   BMI 22 68 kg/m²     Problem List Items Addressed This Visit        Nervous and Auditory    Sciatica     Intermittent  Not recently an issue  Will monitor  Musculoskeletal and Integument    Osteoporosis     Currently on Ca and D, but no interest in osteoporosis medication  Due for next DEXA in 2/2022  Relevant Orders    CBC and differential    Comprehensive metabolic panel    TSH, 3rd generation with Free T4 reflex       Other    Hyperlipidemia     Currently on atorvastatin 20 mg daily  Recheck labs prior to next visit  Relevant Orders    Lipid Panel with Direct LDL reflex    Vitamin B12 deficiency     No longer supplementing  Will recheck with other labs  Relevant Orders    Vitamin B12    Vitamin D deficiency     Currently on 1000 units daily  Recheck with labs ordered  Relevant Orders    Comprehensive metabolic panel    Vitamin D 25 hydroxy    Anxiety     Controlled  No meds needed  Will monitor              Other Visit Diagnoses     Preop examination    -  Primary    Relevant Orders    POCT ECG    Colon cancer screening        Relevant Orders    Cologuard    Cataract of both eyes, unspecified cataract type Physical Exam  Vitals signs reviewed  Constitutional:       General: She is not in acute distress  Appearance: Normal appearance  She is well-developed and normal weight  She is not ill-appearing  HENT:      Head: Normocephalic and atraumatic  Right Ear: Tympanic membrane, ear canal and external ear normal       Left Ear: Tympanic membrane, ear canal and external ear normal       Nose: Nose normal       Mouth/Throat:      Pharynx: No oropharyngeal exudate  Eyes:      Conjunctiva/sclera: Conjunctivae normal       Pupils: Pupils are equal, round, and reactive to light  Neck:      Musculoskeletal: Normal range of motion and neck supple  Thyroid: No thyromegaly  Cardiovascular:      Rate and Rhythm: Normal rate and regular rhythm  Pulses: Normal pulses  Heart sounds: Normal heart sounds  No murmur  Pulmonary:      Effort: Pulmonary effort is normal       Breath sounds: Normal breath sounds  No wheezing, rhonchi or rales  Abdominal:      General: Bowel sounds are normal  There is no distension  Palpations: Abdomen is soft  There is no mass  Tenderness: There is no abdominal tenderness  Musculoskeletal:      Right lower leg: No edema  Left lower leg: No edema  Lymphadenopathy:      Cervical: No cervical adenopathy  Skin:     General: Skin is warm and dry  Findings: No rash  Neurological:      Mental Status: She is alert  Sensory: No sensory deficit  Comments: 5/5 strength in UE and LE   Psychiatric:         Mood and Affect: Mood normal          Behavior: Behavior normal          Thought Content: Thought content normal          Judgment: Judgment normal        EKG: normal EKG, normal sinus rhythm     Ventricular rate: 64 bpm

## 2021-05-21 ENCOUNTER — RA CDI HCC (OUTPATIENT)
Dept: OTHER | Facility: HOSPITAL | Age: 71
End: 2021-05-21

## 2021-05-21 NOTE — PROGRESS NOTES
[facility] 75  coding opportunities          Chart reviewed, no opportunity found: CHART REVIEWED, NO OPPORTUNITY FOUND              Patients insurance company: [employer] (Medicare Advantage and Commercial)

## 2021-05-28 ENCOUNTER — CONSULT (OUTPATIENT)
Dept: FAMILY MEDICINE CLINIC | Facility: CLINIC | Age: 71
End: 2021-05-28
Payer: COMMERCIAL

## 2021-05-28 VITALS
DIASTOLIC BLOOD PRESSURE: 80 MMHG | TEMPERATURE: 97.8 F | SYSTOLIC BLOOD PRESSURE: 150 MMHG | HEART RATE: 71 BPM | OXYGEN SATURATION: 100 % | WEIGHT: 124 LBS | HEIGHT: 62 IN | BODY MASS INDEX: 22.82 KG/M2

## 2021-05-28 DIAGNOSIS — Z01.818 PREOP EXAMINATION: Primary | ICD-10-CM

## 2021-05-28 DIAGNOSIS — Z12.11 COLON CANCER SCREENING: ICD-10-CM

## 2021-05-28 DIAGNOSIS — M81.0 OSTEOPOROSIS, UNSPECIFIED OSTEOPOROSIS TYPE, UNSPECIFIED PATHOLOGICAL FRACTURE PRESENCE: ICD-10-CM

## 2021-05-28 DIAGNOSIS — E55.9 VITAMIN D DEFICIENCY: ICD-10-CM

## 2021-05-28 DIAGNOSIS — H26.9 CATARACT OF BOTH EYES, UNSPECIFIED CATARACT TYPE: ICD-10-CM

## 2021-05-28 DIAGNOSIS — E78.5 HYPERLIPIDEMIA, UNSPECIFIED HYPERLIPIDEMIA TYPE: ICD-10-CM

## 2021-05-28 DIAGNOSIS — M54.30 SCIATICA, UNSPECIFIED LATERALITY: ICD-10-CM

## 2021-05-28 DIAGNOSIS — E53.8 VITAMIN B12 DEFICIENCY: ICD-10-CM

## 2021-05-28 DIAGNOSIS — F41.9 ANXIETY: ICD-10-CM

## 2021-05-28 PROCEDURE — T1015 CLINIC SERVICE: HCPCS | Performed by: PHYSICIAN ASSISTANT

## 2021-05-28 PROCEDURE — 3725F SCREEN DEPRESSION PERFORMED: CPT | Performed by: PHYSICIAN ASSISTANT

## 2021-05-28 PROCEDURE — 99214 OFFICE O/P EST MOD 30 MIN: CPT | Performed by: PHYSICIAN ASSISTANT

## 2021-05-28 NOTE — PATIENT INSTRUCTIONS
Upper Back Exercises   AMBULATORY CARE:   Upper back exercises  help heal and strengthen your back muscles and prevent another injury  Ask your healthcare provider if you need to see a physical therapist for more advanced exercises  · Do the exercises on a mat or firm surface  (not on a bed) to support your spine  · Move slowly and smoothly  Avoid fast or jerky motions  · Breathe normally  Do not hold your breath  · Stop if you feel pain  It is normal to feel some discomfort at first  Regular exercise will help decrease your discomfort over time  Seek care immediately if:   · You have severe pain that prevents you from moving  Contact your healthcare provider if:   · Your pain becomes worse  · You have new pain  · You have questions or concerns about your condition, care, or exercise program     Perform upper back exercises safely:  Ask your healthcare provider which of the following exercises are best for you and how often to do them  · Head rolls:  Sit in a chair or stand  Bring your chin toward your chest and roll your head to the right  Your ear should be over your shoulder  Hold this position for 5 seconds  Roll your head back toward your chest and to the left  Your ear should be over your left shoulder  Hold this position for 5 seconds  Next, roll your head back slowly in a clockwise [place] and repeat 3 times  Do 3 sets of head rolls  · Scapular squeeze:  Sit or stand with your arms at your sides  Squeeze your shoulder blades together and hold for 3 seconds  Relax and repeat 3 times  · Pectoralis stretch:   a doorway  Lift your hands and place them on each side of the door frame or wall slightly higher than your head  Lean forward slowly until you feel a gentle stretch  Hold for 15 seconds  Repeat 3 times, or as directed  · Cat and camel exercise:  Place your hands and knees on the floor   Arch your back upward toward the ceiling and lower your head  Round out your spine as much as you can  Hold for 5 seconds  Lift your head upward and push your chest downward toward the floor  Hold for 5 seconds  Do 3 sets or as directed  · Bird dog:  Place your hands and knees on the floor  Keep your wrists directly below your shoulders and your knees directly below your hips  Pull your belly button in toward your spine  Do not flatten or arch your back  Tighten your abdominal muscles  Raise one arm straight out so that it is aligned with your head  Next, raise the leg opposite your arm  Hold this position for 15 seconds  Lower your arm and leg slowly and change sides  Do 5 sets  © Copyright [address] Information is for End User's use only and may not be sold, redistributed or otherwise used for commercial purposes  All illustrations and images included in CareNotes® are the copyrighted property of [employer]  or Formerly named [facility] Karissa Solorzano   The above information is an  only  It is not intended as medical advice for individual conditions or treatments  Talk to your doctor, nurse or pharmacist before following any medical regimen to see if it is safe and effective for you

## 2021-05-30 PROCEDURE — 93000 ELECTROCARDIOGRAM COMPLETE: CPT | Performed by: PHYSICIAN ASSISTANT

## 2021-06-03 NOTE — PROGRESS NOTES
FAMILY PRACTICE OFFICE VISIT  [facility] - [facility] PRIMARY CARE       NAME: Antonio Nieves  AGE: 70 y o  SEX: female       : 1950        MRN: [id number]    DATE: 2021  TIME: 6:53 PM    Assessment and Plan     Problem List Items Addressed This Visit     None      Visit Diagnoses     Blood pressure elevated without history of HTN    -  Primary    BP improved/is normal today without medication  She is now at acceptable risk for the proposed cataract extraction  Form will be faxed to Dr Cecelia Aragon  Chief Complaint     Chief Complaint   Patient presents with    Follow-up     BP Reassessment       History of Present Illness   Antonio Dover is a 70y o -year-old female who presents for follow-up on BP  BP was high last week during preop - not cleared until that it addressed  Today she has had no coffee or excessive sodium  She reports home readings have been 688 and 374 systolic readings over the last week  She is not currently on blood pressure medications  The patient denies symptoms of poor control such as chest pain, shortness of breath, leg swelling, vision changes, headaches, or dizziness  Review of Systems   Review of Systems   Constitutional: Negative for chills and fever  Respiratory: Negative for shortness of breath  Cardiovascular: Negative for chest pain, palpitations and leg swelling  Neurological: Negative for dizziness and headaches         Active Problem List     Patient Active Problem List   Diagnosis    Fatigue    Hyperlipidemia    Microscopic hematuria    Nontoxic single thyroid nodule    Osteoporosis    Positive PPD    Sciatica    Simple goiter    Vitamin B12 deficiency    Vitamin D deficiency    Callus of foot    Plantar wart of both feet    Dizziness    Paresthesia of left arm    Anxiety    Heel pain, chronic, left    Osteoporosis of lumbar spine         Past Medical History:  Past Medical History: Diagnosis Date    Anemia     last assessed: 5/27/2016    Anxiety     last assessed: 11/16/2015    Esophageal reflux     resolved: 2/28/2017    External hemorrhoids     last assessed: 11/20/2012    Migraine     resolved after went through menopause       Past Surgical History:  Past Surgical History:   Procedure Laterality Date    ESOPHAGOGASTRODUODENOSCOPY  2015    diagnostic; neg    FINE NEEDLE ASPIRATION  2013    thyroid nodule-negative for malignancy       Family History:  Family History   Problem Relation Age of Onset    Diabetes Mother         mellitus    Diabetes Father         mellitus    Arthritis Sister     Kidney disease Sister         kidneys are "shrinking" due to lots of protein    Thyroid disease Sister         disorder    Hypertension Sister     Breast cancer Sister     Breast cancer Family     No Known Problems Daughter     No Known Problems Maternal Grandmother     No Known Problems Maternal Grandfather     No Known Problems Paternal Grandmother     No Known Problems Paternal Grandfather     No Known Problems Sister     No Known Problems Sister     Osteoporosis Sister     No Known Problems Sister     No Known Problems Sister     No Known Problems Daughter     No Known Problems Maternal Aunt     No Known Problems Paternal Aunt        Social History:  Social History     Socioeconomic History    Marital status: /Civil Union     Spouse name: Not on file    Number of children: Not on file    Years of education: Not on file    Highest education level: Not on file   Occupational History    Occupation: teacher   Social Needs    Financial resource strain: Not on file    Food insecurity     Worry: Not on file     Inability: Not on file   [employer] needs     Medical: Not on file     Non-medical: Not on file   Tobacco Use    Smoking status: Never Smoker    Smokeless tobacco: Never Used   Substance and Sexual Activity    Alcohol use: Never     Frequency: Never    Drug use: Never    Sexual activity: Not on file   Lifestyle    Physical activity     Days per week: Not on file     Minutes per session: Not on file    Stress: Not on file   Relationships    Social connections     Talks on phone: Not on file     Gets together: Not on file     Attends [employer] service: Not on file     Active member of club or organization: Not on file     Attends meetings of clubs or organizations: Not on file     Relationship status: Not on file    Intimate partner violence     Fear of current or ex partner: Not on file     Emotionally abused: Not on file     Physically abused: Not on file     Forced sexual activity: Not on file   Other Topics Concern    Not on file   Social History Narrative    Denied: history of caffeine use       Objective     Vitals:    06/04/21 1708   BP: 134/64   BP Location: Left arm   Patient Position: Sitting   Cuff Size: Adult   Pulse: 66   Temp: (!) 97 3 °F (36 3 °C)   TempSrc: Temporal   SpO2: 100%   Weight: 55 8 kg (123 lb)   Height: 5' 2" (1 575 m)     Wt Readings from Last 3 Encounters:   06/04/21 55 8 kg (123 lb)   05/28/21 56 2 kg (124 lb)   12/07/20 58 9 kg (129 lb 12 8 oz)       Physical Exam  Vitals signs reviewed  Constitutional:       General: She is not in acute distress  Appearance: Normal appearance  She is well-developed and normal weight  She is not ill-appearing  HENT:      Head: Normocephalic and atraumatic  Neck:      Musculoskeletal: Neck supple  Thyroid: No thyromegaly  Cardiovascular:      Rate and Rhythm: Normal rate and regular rhythm  Pulses: Normal pulses  Heart sounds: Normal heart sounds  No murmur  Comments: No carotid bruits noted  Pulmonary:      Effort: Pulmonary effort is normal       Breath sounds: Normal breath sounds  No wheezing, rhonchi or rales  Musculoskeletal:      Right lower leg: No edema  Left lower leg: No edema  Lymphadenopathy:      Cervical: No cervical adenopathy  Neurological:      Mental Status: She is alert  Psychiatric:         Behavior: Behavior normal          Thought Content:  Thought content normal          Judgment: Judgment normal          Pertinent Laboratory/Diagnostic Studies:  Lab Results   Component Value Date    GLUCOSE 113 11/19/2015    BUN 12 10/20/2020    CREATININE 0 68 10/20/2020    CALCIUM 9 2 10/20/2020    CALCIUM 9 2 10/20/2020     03/06/2017    K 4 0 10/20/2020    CO2 27 10/20/2020     10/20/2020     Lab Results   Component Value Date    ALT 17 10/20/2020    AST 15 10/20/2020    ALKPHOS 80 10/20/2020    BILITOT 0 3 03/06/2017       Lab Results   Component Value Date    WBC 5 9 10/20/2020    HGB 13 6 10/20/2020    HCT 40 6 10/20/2020    MCV 88 6 10/20/2020     10/20/2020     Lab Results   Component Value Date    CHOL 188 08/30/2016     Lab Results   Component Value Date    TRIG 82 10/20/2020     Lab Results   Component Value Date    HDL 56 10/20/2020     Lab Results   Component Value Date    LDLCALC 79 10/20/2020     Results for orders placed or performed in visit on 10/20/20   Lipid Panel with Direct LDL reflex   Result Value Ref Range    Total Cholesterol 151 <200 mg/dL    HDL 56 > OR = 50 mg/dL    Triglycerides 82 <150 mg/dL    LDL Calculated 79 mg/dL (calc)    Chol HDLC Ratio 2 7 <5 0 (calc)    Non-HDL Cholesterol 95 <130 mg/dL (calc)   PTH, Intact and Calcium   Result Value Ref Range    PTH, Intact 50 14 - 64 pg/mL    Calcium 9 2 8 6 - 10 4 mg/dL   Comprehensive metabolic panel   Result Value Ref Range    Glucose, Random 93 65 - 99 mg/dL    BUN 12 7 - 25 mg/dL    Creatinine 0 68 0 60 - 0 93 mg/dL    eGFR Non  89 > OR = 60 mL/min/1 73m2    eGFR  103 > OR = 60 mL/min/1 73m2    SL AMB BUN/CREATININE RATIO NOT APPLICABLE 6 - 22 (calc)    Sodium 137 135 - 146 mmol/L    Potassium 4 0 3 5 - 5 3 mmol/L    Chloride 105 98 - 110 mmol/L    CO2 27 20 - 32 mmol/L    Calcium 9 2 8 6 - 10 4 mg/dL    Protein, Total 6 7 6 1 - 8 1 g/dL    Albumin 4 1 3 6 - 5 1 g/dL    Globulin 2 6 1 9 - 3 7 g/dL (calc)    Albumin/Globulin Ratio 1 6 1 0 - 2 5 (calc)    TOTAL BILIRUBIN 0 6 0 2 - 1 2 mg/dL    Alkaline Phosphatase 80 37 - 153 U/L    AST 15 10 - 35 U/L    ALT 17 6 - 29 U/L   CBC and differential   Result Value Ref Range    White Blood Cell Count 5 9 3 8 - 10 8 Thousand/uL    Red Blood Cell Count 4 58 3 80 - 5 10 Million/uL    Hemoglobin 13 6 11 7 - 15 5 g/dL    HCT 40 6 35 0 - 45 0 %    MCV 88 6 80 0 - 100 0 fL    MCH 29 7 27 0 - 33 0 pg    MCHC 33 5 32 0 - 36 0 g/dL    RDW 11 9 11 0 - 15 0 %    Platelet Count 506 691 - 400 Thousand/uL    SL AMB MPV 10 7 7 5 - 12 5 fL    Neutrophils (Absolute) 2,283 1,500 - 7,800 cells/uL    Lymphocytes (Absolute) 3,044 850 - 3,900 cells/uL    Monocytes (Absolute) 395 200 - 950 cells/uL    Eosinophils (Absolute) 124 15 - 500 cells/uL    Basophils ABS 53 0 - 200 cells/uL    Neutrophils 38 7 %    Lymphocytes 51 6 %    Monocytes 6 7 %    Eosinophils 2 1 %    Basophils PCT 0 9 %   Vitamin B12   Result Value Ref Range    Vitamin B-12 218 200 - 1,100 pg/mL   Vitamin D 25 hydroxy   Result Value Ref Range    Vitamin D, 25-Hydroxy, Serum 39 30 - 100 ng/mL   TSH, 3rd generation with Free T4 reflex   Result Value Ref Range    TSH W/RFX TO FREE T4 1 51 0 40 - 4 50 mIU/L         ALLERGIES:  Allergies   Allergen Reactions    Fosamax [Alendronate] Vomiting and Abdominal Pain       Current Medications     Current Outpatient Medications   Medication Sig Dispense Refill    atorvastatin (LIPITOR) 20 mg tablet Take 1 tablet (20 mg total) by mouth daily 90 tablet 3    calcium carbonate (CALCIUM 600) 600 MG tablet Take 1 tablet (600 mg total) by mouth 2 (two) times a day with meals      cholecalciferol (VITAMIN D3) 1,000 units tablet Take 1 tablet by mouth daily       No current facility-administered medications for this visit            Health Maintenance     Health Maintenance   Topic Date Due    COVID-19 Vaccine (1) Never done    Cervical Cancer Screening  Never done    Colorectal Cancer Screening  Never done    DTaP,Tdap,and Td Vaccines (2 - Td) 05/12/2019    MAMMOGRAM  02/05/2021    Fall Risk  12/07/2021    Medicare Annual Wellness Visit (AWV)  12/07/2021    DXA SCAN  02/07/2022    Depression Screening PHQ  05/28/2022    BMI: Adult  06/04/2022    Hepatitis C Screening  Completed    Pneumococcal Vaccine: 65+ Years  Completed    Influenza Vaccine  Completed    HIB Vaccine  Aged Out    Hepatitis B Vaccine  Aged Out    IPV Vaccine  Aged Out    Hepatitis A Vaccine  Aged Out    Meningococcal ACWY Vaccine  Aged Out    HPV Vaccine  Aged Out     Immunization History   Administered Date(s) Administered    H1N1, All Formulations 10/29/2009    Hep A, adult 05/12/2009    INFLUENZA 12/05/2006    Influenza Split High Dose Preservative Free IM 09/01/2016, 10/05/2017    Influenza, high dose seasonal 0 7 mL 11/27/2018, 10/07/2019, 10/07/2020    Influenza, seasonal, injectable 12/19/2005, 12/10/2007, 11/08/2008, 10/11/2012, 09/26/2014, 09/16/2015    Pneumococcal Conjugate 13-Valent 02/28/2017    Pneumococcal Polysaccharide PPV23 06/22/2018    Tdap 05/12/2009    Tuberculin Skin Test-PPD Intradermal 04/01/1998       Mayank Elizabeth PA-C  6/4/2021 6:53 PM  [address] Primary Care

## 2021-06-04 ENCOUNTER — OFFICE VISIT (OUTPATIENT)
Dept: FAMILY MEDICINE CLINIC | Facility: CLINIC | Age: 71
End: 2021-06-04
Payer: COMMERCIAL

## 2021-06-04 VITALS
WEIGHT: 123 LBS | DIASTOLIC BLOOD PRESSURE: 64 MMHG | TEMPERATURE: 97.3 F | SYSTOLIC BLOOD PRESSURE: 134 MMHG | HEART RATE: 66 BPM | OXYGEN SATURATION: 100 % | HEIGHT: 62 IN | BODY MASS INDEX: 22.63 KG/M2

## 2021-06-04 DIAGNOSIS — R03.0 BLOOD PRESSURE ELEVATED WITHOUT HISTORY OF HTN: Primary | ICD-10-CM

## 2021-06-04 PROCEDURE — 1036F TOBACCO NON-USER: CPT | Performed by: PHYSICIAN ASSISTANT

## 2021-06-04 PROCEDURE — 3008F BODY MASS INDEX DOCD: CPT | Performed by: PHYSICIAN ASSISTANT

## 2021-06-04 PROCEDURE — 1160F RVW MEDS BY RX/DR IN RCRD: CPT | Performed by: PHYSICIAN ASSISTANT

## 2021-06-04 PROCEDURE — 99213 OFFICE O/P EST LOW 20 MIN: CPT | Performed by: PHYSICIAN ASSISTANT

## 2021-06-10 ENCOUNTER — TELEPHONE (OUTPATIENT)
Dept: FAMILY MEDICINE CLINIC | Facility: CLINIC | Age: 71
End: 2021-06-10

## 2021-06-10 NOTE — TELEPHONE ENCOUNTER
Patient was just cleared  Please print notes from 5/28 and 6/4 and submit with the form  Just write "please see attached notes" on the form

## 2021-06-10 NOTE — TELEPHONE ENCOUNTER
pts  dropped off a form for pt  She has surgery on July 12th for her left eye  Forms will be in accordance file for when you come back

## 2021-06-10 NOTE — TELEPHONE ENCOUNTER
Can I stamp your signature ?  We faxed the first form yesterday and he dropped off another today that needs to be signed

## 2021-07-02 ENCOUNTER — CONSULT (OUTPATIENT)
Dept: FAMILY MEDICINE CLINIC | Facility: CLINIC | Age: 71
End: 2021-07-02
Payer: COMMERCIAL

## 2021-07-02 VITALS
HEIGHT: 62 IN | HEART RATE: 75 BPM | WEIGHT: 127 LBS | OXYGEN SATURATION: 99 % | DIASTOLIC BLOOD PRESSURE: 70 MMHG | BODY MASS INDEX: 23.37 KG/M2 | SYSTOLIC BLOOD PRESSURE: 130 MMHG

## 2021-07-02 DIAGNOSIS — Z01.818 PREOP EXAMINATION: Primary | ICD-10-CM

## 2021-07-02 PROCEDURE — 1036F TOBACCO NON-USER: CPT | Performed by: INTERNAL MEDICINE

## 2021-07-02 PROCEDURE — 99214 OFFICE O/P EST MOD 30 MIN: CPT | Performed by: INTERNAL MEDICINE

## 2021-07-02 PROCEDURE — 3008F BODY MASS INDEX DOCD: CPT | Performed by: INTERNAL MEDICINE

## 2021-07-02 RX ORDER — PREDNISOLONE ACETATE 10 MG/ML
SUSPENSION/ DROPS OPHTHALMIC
COMMUNITY
Start: 2021-06-28 | End: 2021-11-08 | Stop reason: ALTCHOICE

## 2021-07-02 RX ORDER — BROMFENAC SODIUM 0.7 MG/ML
SOLUTION/ DROPS OPHTHALMIC
COMMUNITY
Start: 2021-06-10 | End: 2021-11-08 | Stop reason: ALTCHOICE

## 2021-07-02 RX ORDER — OFLOXACIN 3 MG/ML
SOLUTION/ DROPS OPHTHALMIC
COMMUNITY
Start: 2021-06-28 | End: 2021-11-08 | Stop reason: ALTCHOICE

## 2021-07-02 NOTE — PROGRESS NOTES
Assessment/Plan:    No problem-specific Assessment & Plan notes found for this encounter  Diagnoses and all orders for this visit:    Preop examination    Other orders  -     Cancel: Ambulatory referral to Gynecology; Future  -     Prolensa 0 07 % SOLN; INSTILL 1 DROP INTO AFFECTED EYE ONCE A DAY AS DIRECTED  START 3 DAYS PRIOR TO SURGERY  -     ofloxacin (OCUFLOX) 0 3 % ophthalmic solution; INSTILL ONE DROP INTO THE OPERATIVE EYE 4 TIMES A DAY START 3 DAYS PRIOR TO SURGERY  -     TobraDex ophthalmic ointment; APPLY A SMALL AMOUNT INTO BOTH EYES TWICE A DAY AS DIRECTED  -     prednisoLONE acetate (PRED FORTE) 1 % ophthalmic suspension; INSTILL 1 DROP INTO AFFECTED EYE 3 TIMES A DAY AS DIRECTED  START 3 DAYS PRIOR TO SURGERY          According to revised cardiac risk index calculator patient is low risk of MI, cardiac arrest or death during low risk surgery  She may proceed with surgery  Her vital signs are within normal limits  She does not report any cardiac complaints  She may resume her medications as scheduled  Subjective:      Patient ID: Mily Solis is a 70 y o  female  Patient came today for physical exam before upcoming eye surgery on 07/12/2021 under MAC/local anesthesia she just had right eye surgery done  She does not have any significant cardiac history, denies any exertional symptoms  She is not on any blood thinners  She does not smoke  Vital signs are okay, blood pressure rechecked by me 130/80  The following portions of the patient's history were reviewed and updated as appropriate: allergies, current medications, past family history, past medical history, past social history, past surgical history, and problem list     Review of Systems   Constitutional: Negative for chills and fever  Respiratory: Negative for shortness of breath  Cardiovascular: Negative for chest pain, palpitations and leg swelling  Gastrointestinal: Negative for abdominal distention  Genitourinary: Negative for difficulty urinating  Skin: Negative for color change  Neurological: Negative for dizziness and headaches  Psychiatric/Behavioral: Negative for agitation  Objective:      /70 (BP Location: Left arm, Patient Position: Sitting, Cuff Size: Adult)   Pulse 75   Ht 5' 2" (1 575 m)   Wt 57 6 kg (127 lb)   SpO2 99%   BMI 23 23 kg/m²          Physical Exam  Vitals reviewed  HENT:      Head: Normocephalic  Eyes:      Pupils: Pupils are equal, round, and reactive to light  Cardiovascular:      Rate and Rhythm: Normal rate  Pulses: Normal pulses  Pulmonary:      Effort: Pulmonary effort is normal    Abdominal:      General: Abdomen is flat  Musculoskeletal:         General: Normal range of motion  Skin:     General: Skin is warm  Neurological:      General: No focal deficit present  Mental Status: She is alert  Psychiatric:         Mood and Affect: Mood normal                Current Outpatient Medications:     atorvastatin (LIPITOR) 20 mg tablet, Take 1 tablet (20 mg total) by mouth daily, Disp: 90 tablet, Rfl: 3    calcium carbonate (CALCIUM 600) 600 MG tablet, Take 1 tablet (600 mg total) by mouth 2 (two) times a day with meals, Disp: , Rfl:     cholecalciferol (VITAMIN D3) 1,000 units tablet, Take 1 tablet by mouth daily, Disp: , Rfl:     ofloxacin (OCUFLOX) 0 3 % ophthalmic solution, INSTILL ONE DROP INTO THE OPERATIVE EYE 4 TIMES A DAY START 3 DAYS PRIOR TO SURGERY, Disp: , Rfl:     prednisoLONE acetate (PRED FORTE) 1 % ophthalmic suspension, INSTILL 1 DROP INTO AFFECTED EYE 3 TIMES A DAY AS DIRECTED  START 3 DAYS PRIOR TO SURGERY, Disp: , Rfl:     Prolensa 0 07 % SOLN, INSTILL 1 DROP INTO AFFECTED EYE ONCE A DAY AS DIRECTED   START 3 DAYS PRIOR TO SURGERY, Disp: , Rfl:     TobraDex ophthalmic ointment, APPLY A SMALL AMOUNT INTO BOTH EYES TWICE A DAY AS DIRECTED, Disp: , Rfl:

## 2021-07-09 ENCOUNTER — TELEPHONE (OUTPATIENT)
Dept: FAMILY MEDICINE CLINIC | Facility: CLINIC | Age: 71
End: 2021-07-09

## 2021-07-09 NOTE — TELEPHONE ENCOUNTER
I called the pt to follow-up with her on her cologuard test and the pt stated that she will do it in august and then follow-up with us

## 2021-07-20 ENCOUNTER — VBI (OUTPATIENT)
Dept: ADMINISTRATIVE | Facility: OTHER | Age: 71
End: 2021-07-20

## 2021-08-09 ENCOUNTER — OFFICE VISIT (OUTPATIENT)
Dept: FAMILY MEDICINE CLINIC | Facility: CLINIC | Age: 71
End: 2021-08-09
Payer: COMMERCIAL

## 2021-08-09 VITALS
HEIGHT: 62 IN | HEART RATE: 83 BPM | OXYGEN SATURATION: 99 % | RESPIRATION RATE: 18 BRPM | WEIGHT: 128 LBS | BODY MASS INDEX: 23.55 KG/M2 | DIASTOLIC BLOOD PRESSURE: 64 MMHG | SYSTOLIC BLOOD PRESSURE: 130 MMHG

## 2021-08-09 DIAGNOSIS — M94.0 COSTOCHONDRITIS: Primary | ICD-10-CM

## 2021-08-09 PROCEDURE — 99214 OFFICE O/P EST MOD 30 MIN: CPT | Performed by: FAMILY MEDICINE

## 2021-08-09 PROCEDURE — 3008F BODY MASS INDEX DOCD: CPT | Performed by: FAMILY MEDICINE

## 2021-08-09 PROCEDURE — 1036F TOBACCO NON-USER: CPT | Performed by: FAMILY MEDICINE

## 2021-08-09 PROCEDURE — 1160F RVW MEDS BY RX/DR IN RCRD: CPT | Performed by: FAMILY MEDICINE

## 2021-08-09 PROCEDURE — 93000 ELECTROCARDIOGRAM COMPLETE: CPT | Performed by: FAMILY MEDICINE

## 2021-08-09 RX ORDER — MELOXICAM 15 MG/1
15 TABLET ORAL DAILY
Qty: 30 TABLET | Refills: 1 | Status: SHIPPED | OUTPATIENT
Start: 2021-08-09 | End: 2021-10-22 | Stop reason: ALTCHOICE

## 2021-08-09 NOTE — PROGRESS NOTES
Assessment/Plan:       Problem List Items Addressed This Visit        Musculoskeletal and Integument    Costochondritis - Primary     EKG was normal today  She does appear to have costochondritis on exam   I am going to place her on meloxicam daily for 10 days and then as needed  Relevant Medications    meloxicam (MOBIC) 15 mg tablet            Subjective:      Patient ID: Ayden Garcia is a 70 y o  female  HPI patient presents today complaining of pain in her left chest wall  She notes she turned while in bed and had acute onset of pain 1 week ago  The pain has been persistent  He gets worse if she takes a very deep breath or she turns  She denies shortness of breath, palpitations or lightheadedness  She is taking no medication for the pain at this point  The following portions of the patient's history were reviewed and updated as appropriate: allergies, current medications, past family history, past medical history, past social history, past surgical history and problem list       Current Outpatient Medications:     atorvastatin (LIPITOR) 20 mg tablet, Take 1 tablet (20 mg total) by mouth daily, Disp: 90 tablet, Rfl: 3    calcium carbonate (CALCIUM 600) 600 MG tablet, Take 1 tablet (600 mg total) by mouth 2 (two) times a day with meals, Disp: , Rfl:     cholecalciferol (VITAMIN D3) 1,000 units tablet, Take 1 tablet by mouth daily, Disp: , Rfl:     ofloxacin (OCUFLOX) 0 3 % ophthalmic solution, INSTILL ONE DROP INTO THE OPERATIVE EYE 4 TIMES A DAY START 3 DAYS PRIOR TO SURGERY, Disp: , Rfl:     prednisoLONE acetate (PRED FORTE) 1 % ophthalmic suspension, INSTILL 1 DROP INTO AFFECTED EYE 3 TIMES A DAY AS DIRECTED  START 3 DAYS PRIOR TO SURGERY, Disp: , Rfl:     Prolensa 0 07 % SOLN, INSTILL 1 DROP INTO AFFECTED EYE ONCE A DAY AS DIRECTED   START 3 DAYS PRIOR TO SURGERY, Disp: , Rfl:     TobraDex ophthalmic ointment, APPLY A SMALL AMOUNT INTO BOTH EYES TWICE A DAY AS DIRECTED, Disp: , Rfl:     meloxicam (MOBIC) 15 mg tablet, Take 1 tablet (15 mg total) by mouth daily, Disp: 30 tablet, Rfl: 1     Review of Systems   Constitutional: Negative for appetite change, chills, fatigue, fever and unexpected weight change  HENT: Negative for trouble swallowing  Eyes: Negative for visual disturbance  Respiratory: Negative for cough, chest tightness, shortness of breath and wheezing  Cardiovascular: Positive for chest pain  Negative for palpitations and leg swelling  Gastrointestinal: Negative for abdominal distention, abdominal pain, blood in stool, constipation and diarrhea  Endocrine: Negative for polyuria  Genitourinary: Negative for difficulty urinating and flank pain  Musculoskeletal: Negative for arthralgias and myalgias  Skin: Negative for rash  Neurological: Negative for dizziness and light-headedness  Hematological: Negative for adenopathy  Does not bruise/bleed easily  Psychiatric/Behavioral: Negative for dysphoric mood and sleep disturbance  The patient is not nervous/anxious  Objective:      /64 (BP Location: Left arm, Patient Position: Sitting, Cuff Size: Adult)   Pulse 83   Resp 18   Ht 5' 2" (1 575 m)   Wt 58 1 kg (128 lb)   SpO2 99%   BMI 23 41 kg/m²          Physical Exam  Constitutional:       General: She is not in acute distress  Appearance: She is well-developed  She is not diaphoretic  HENT:      Head: Normocephalic  Eyes:      General:         Right eye: No discharge  Left eye: No discharge  Pupils: Pupils are equal, round, and reactive to light  Neck:      Thyroid: No thyromegaly  Trachea: No tracheal deviation  Cardiovascular:      Rate and Rhythm: Normal rate and regular rhythm  Heart sounds: Normal heart sounds  No murmur heard  Pulmonary:      Effort: Pulmonary effort is normal  No respiratory distress  Breath sounds: No wheezing or rales  Abdominal:      General: There is no distension  Palpations: Abdomen is soft  Tenderness: There is no abdominal tenderness  Musculoskeletal:         General: Tenderness (She has significant tenderness along the left lateral margin of her sternum ) present  Normal range of motion  Lymphadenopathy:      Cervical: No cervical adenopathy  Skin:     General: Skin is warm  Findings: No erythema  Neurological:      Mental Status: She is alert and oriented to person, place, and time  Cranial Nerves: No cranial nerve deficit  Psychiatric:         Thought Content:  Thought content normal          Judgment: Judgment normal            Sam Moreno MD

## 2021-08-09 NOTE — ASSESSMENT & PLAN NOTE
EKG was normal today  She does appear to have costochondritis on exam   I am going to place her on meloxicam daily for 10 days and then as needed

## 2021-09-21 ENCOUNTER — VBI (OUTPATIENT)
Dept: ADMINISTRATIVE | Facility: OTHER | Age: 71
End: 2021-09-21

## 2021-10-05 ENCOUNTER — IMMUNIZATIONS (OUTPATIENT)
Dept: FAMILY MEDICINE CLINIC | Facility: CLINIC | Age: 71
End: 2021-10-05
Payer: COMMERCIAL

## 2021-10-05 DIAGNOSIS — Z23 NEED FOR INFLUENZA VACCINATION: Primary | ICD-10-CM

## 2021-10-05 PROCEDURE — 90662 IIV NO PRSV INCREASED AG IM: CPT

## 2021-10-05 PROCEDURE — G0008 ADMIN INFLUENZA VIRUS VAC: HCPCS

## 2021-10-06 ENCOUNTER — TELEMEDICINE (OUTPATIENT)
Dept: FAMILY MEDICINE CLINIC | Facility: CLINIC | Age: 71
End: 2021-10-06
Payer: COMMERCIAL

## 2021-10-06 DIAGNOSIS — G44.009 CLUSTER HEADACHE, NOT INTRACTABLE, UNSPECIFIED CHRONICITY PATTERN: Primary | ICD-10-CM

## 2021-10-06 PROCEDURE — 99213 OFFICE O/P EST LOW 20 MIN: CPT | Performed by: INTERNAL MEDICINE

## 2021-10-06 RX ORDER — NAPROXEN 500 MG/1
500 TABLET ORAL 2 TIMES DAILY PRN
Qty: 20 TABLET | Refills: 0 | Status: SHIPPED | OUTPATIENT
Start: 2021-10-06 | End: 2021-10-11 | Stop reason: ALTCHOICE

## 2021-10-07 ENCOUNTER — TELEPHONE (OUTPATIENT)
Dept: FAMILY MEDICINE CLINIC | Facility: CLINIC | Age: 71
End: 2021-10-07

## 2021-10-11 ENCOUNTER — OFFICE VISIT (OUTPATIENT)
Dept: FAMILY MEDICINE CLINIC | Facility: CLINIC | Age: 71
End: 2021-10-11
Payer: COMMERCIAL

## 2021-10-11 VITALS
SYSTOLIC BLOOD PRESSURE: 128 MMHG | WEIGHT: 132.6 LBS | OXYGEN SATURATION: 100 % | BODY MASS INDEX: 24.4 KG/M2 | HEIGHT: 62 IN | DIASTOLIC BLOOD PRESSURE: 80 MMHG | TEMPERATURE: 98 F | HEART RATE: 80 BPM

## 2021-10-11 DIAGNOSIS — R42 DIZZINESS: Primary | ICD-10-CM

## 2021-10-11 PROCEDURE — 99213 OFFICE O/P EST LOW 20 MIN: CPT | Performed by: INTERNAL MEDICINE

## 2021-10-11 PROCEDURE — 1036F TOBACCO NON-USER: CPT | Performed by: INTERNAL MEDICINE

## 2021-10-11 PROCEDURE — 1160F RVW MEDS BY RX/DR IN RCRD: CPT | Performed by: INTERNAL MEDICINE

## 2021-10-11 RX ORDER — MECLIZINE HCL 12.5 MG/1
12.5 TABLET ORAL EVERY 8 HOURS PRN
Qty: 30 TABLET | Refills: 0 | Status: SHIPPED | OUTPATIENT
Start: 2021-10-11 | End: 2021-12-08

## 2021-10-13 ENCOUNTER — APPOINTMENT (OUTPATIENT)
Dept: RADIOLOGY | Facility: MEDICAL CENTER | Age: 71
End: 2021-10-13
Payer: COMMERCIAL

## 2021-10-13 ENCOUNTER — OFFICE VISIT (OUTPATIENT)
Dept: URGENT CARE | Facility: MEDICAL CENTER | Age: 71
End: 2021-10-13
Payer: COMMERCIAL

## 2021-10-13 VITALS
HEIGHT: 62 IN | SYSTOLIC BLOOD PRESSURE: 150 MMHG | DIASTOLIC BLOOD PRESSURE: 66 MMHG | RESPIRATION RATE: 16 BRPM | BODY MASS INDEX: 24.29 KG/M2 | WEIGHT: 132 LBS | HEART RATE: 80 BPM | TEMPERATURE: 97.6 F | OXYGEN SATURATION: 98 %

## 2021-10-13 DIAGNOSIS — S90.31XA CONTUSION OF RIGHT FOOT, INITIAL ENCOUNTER: ICD-10-CM

## 2021-10-13 DIAGNOSIS — S99.921A INJURY OF RIGHT FOOT, INITIAL ENCOUNTER: ICD-10-CM

## 2021-10-13 DIAGNOSIS — S99.921A INJURY OF RIGHT FOOT, INITIAL ENCOUNTER: Primary | ICD-10-CM

## 2021-10-13 PROCEDURE — 73630 X-RAY EXAM OF FOOT: CPT

## 2021-10-13 PROCEDURE — 99213 OFFICE O/P EST LOW 20 MIN: CPT | Performed by: PHYSICIAN ASSISTANT

## 2021-10-14 LAB
25(OH)D3 SERPL-MCNC: 36 NG/ML (ref 30–100)
ALBUMIN SERPL-MCNC: 4 G/DL (ref 3.6–5.1)
ALBUMIN/GLOB SERPL: 1.5 (CALC) (ref 1–2.5)
ALP SERPL-CCNC: 83 U/L (ref 37–153)
ALT SERPL-CCNC: 13 U/L (ref 6–29)
AST SERPL-CCNC: 13 U/L (ref 10–35)
BASOPHILS # BLD AUTO: 58 CELLS/UL (ref 0–200)
BASOPHILS NFR BLD AUTO: 0.7 %
BILIRUB SERPL-MCNC: 0.5 MG/DL (ref 0.2–1.2)
BUN SERPL-MCNC: 15 MG/DL (ref 7–25)
BUN/CREAT SERPL: 26 (CALC) (ref 6–22)
CALCIUM SERPL-MCNC: 9.2 MG/DL (ref 8.6–10.4)
CHLORIDE SERPL-SCNC: 103 MMOL/L (ref 98–110)
CHOLEST SERPL-MCNC: 169 MG/DL
CHOLEST/HDLC SERPL: 2.7 (CALC)
CO2 SERPL-SCNC: 27 MMOL/L (ref 20–32)
CREAT SERPL-MCNC: 0.58 MG/DL (ref 0.6–0.93)
EOSINOPHIL # BLD AUTO: 174 CELLS/UL (ref 15–500)
EOSINOPHIL NFR BLD AUTO: 2.1 %
ERYTHROCYTE [DISTWIDTH] IN BLOOD BY AUTOMATED COUNT: 11.9 % (ref 11–15)
GLOBULIN SER CALC-MCNC: 2.6 G/DL (CALC) (ref 1.9–3.7)
GLUCOSE SERPL-MCNC: 98 MG/DL (ref 65–99)
HCT VFR BLD AUTO: 40.3 % (ref 35–45)
HDLC SERPL-MCNC: 62 MG/DL
HGB BLD-MCNC: 13.2 G/DL (ref 11.7–15.5)
LDLC SERPL CALC-MCNC: 89 MG/DL (CALC)
LYMPHOCYTES # BLD AUTO: 3154 CELLS/UL (ref 850–3900)
LYMPHOCYTES NFR BLD AUTO: 38 %
MCH RBC QN AUTO: 28.9 PG (ref 27–33)
MCHC RBC AUTO-ENTMCNC: 32.8 G/DL (ref 32–36)
MCV RBC AUTO: 88.4 FL (ref 80–100)
MONOCYTES # BLD AUTO: 664 CELLS/UL (ref 200–950)
MONOCYTES NFR BLD AUTO: 8 %
NEUTROPHILS # BLD AUTO: 4250 CELLS/UL (ref 1500–7800)
NEUTROPHILS NFR BLD AUTO: 51.2 %
NONHDLC SERPL-MCNC: 107 MG/DL (CALC)
PLATELET # BLD AUTO: 299 THOUSAND/UL (ref 140–400)
PMV BLD REES-ECKER: 10.2 FL (ref 7.5–12.5)
POTASSIUM SERPL-SCNC: 4.4 MMOL/L (ref 3.5–5.3)
PROT SERPL-MCNC: 6.6 G/DL (ref 6.1–8.1)
RBC # BLD AUTO: 4.56 MILLION/UL (ref 3.8–5.1)
SL AMB EGFR AFRICAN AMERICAN: 107 ML/MIN/1.73M2
SL AMB EGFR NON AFRICAN AMERICAN: 93 ML/MIN/1.73M2
SODIUM SERPL-SCNC: 136 MMOL/L (ref 135–146)
TRIGL SERPL-MCNC: 85 MG/DL
TSH SERPL-ACNC: 1.73 MIU/L (ref 0.4–4.5)
VIT B12 SERPL-MCNC: 230 PG/ML (ref 200–1100)
WBC # BLD AUTO: 8.3 THOUSAND/UL (ref 3.8–10.8)

## 2021-10-21 ENCOUNTER — VBI (OUTPATIENT)
Dept: ADMINISTRATIVE | Facility: OTHER | Age: 71
End: 2021-10-21

## 2021-10-22 ENCOUNTER — OFFICE VISIT (OUTPATIENT)
Dept: FAMILY MEDICINE CLINIC | Facility: CLINIC | Age: 71
End: 2021-10-22
Payer: COMMERCIAL

## 2021-10-22 VITALS
BODY MASS INDEX: 24.11 KG/M2 | WEIGHT: 131 LBS | SYSTOLIC BLOOD PRESSURE: 160 MMHG | RESPIRATION RATE: 18 BRPM | HEART RATE: 76 BPM | HEIGHT: 62 IN | DIASTOLIC BLOOD PRESSURE: 76 MMHG | OXYGEN SATURATION: 98 %

## 2021-10-22 DIAGNOSIS — I10 ESSENTIAL HYPERTENSION: Primary | ICD-10-CM

## 2021-10-22 PROCEDURE — 3008F BODY MASS INDEX DOCD: CPT | Performed by: INTERNAL MEDICINE

## 2021-10-22 PROCEDURE — 99213 OFFICE O/P EST LOW 20 MIN: CPT | Performed by: INTERNAL MEDICINE

## 2021-10-22 RX ORDER — LOSARTAN POTASSIUM 50 MG/1
50 TABLET ORAL DAILY
Qty: 30 TABLET | Refills: 0 | Status: SHIPPED | OUTPATIENT
Start: 2021-10-22 | End: 2021-11-03 | Stop reason: SDUPTHER

## 2021-11-02 LAB
BUN SERPL-MCNC: 17 MG/DL (ref 7–25)
BUN/CREAT SERPL: NORMAL (CALC) (ref 6–22)
CALCIUM SERPL-MCNC: 9.2 MG/DL (ref 8.6–10.4)
CHLORIDE SERPL-SCNC: 105 MMOL/L (ref 98–110)
CO2 SERPL-SCNC: 29 MMOL/L (ref 20–32)
CREAT SERPL-MCNC: 0.65 MG/DL (ref 0.6–0.93)
GLUCOSE SERPL-MCNC: 98 MG/DL (ref 65–99)
POTASSIUM SERPL-SCNC: 4.3 MMOL/L (ref 3.5–5.3)
SL AMB EGFR AFRICAN AMERICAN: 104 ML/MIN/1.73M2
SL AMB EGFR NON AFRICAN AMERICAN: 89 ML/MIN/1.73M2
SODIUM SERPL-SCNC: 139 MMOL/L (ref 135–146)

## 2021-11-03 DIAGNOSIS — I10 ESSENTIAL HYPERTENSION: ICD-10-CM

## 2021-11-03 RX ORDER — LOSARTAN POTASSIUM 50 MG/1
50 TABLET ORAL DAILY
Qty: 90 TABLET | Refills: 3 | Status: SHIPPED | OUTPATIENT
Start: 2021-11-03 | End: 2021-11-19

## 2021-11-08 ENCOUNTER — OFFICE VISIT (OUTPATIENT)
Dept: FAMILY MEDICINE CLINIC | Facility: CLINIC | Age: 71
End: 2021-11-08
Payer: COMMERCIAL

## 2021-11-08 VITALS
DIASTOLIC BLOOD PRESSURE: 80 MMHG | RESPIRATION RATE: 12 BRPM | HEART RATE: 78 BPM | WEIGHT: 133.4 LBS | SYSTOLIC BLOOD PRESSURE: 140 MMHG | HEIGHT: 62 IN | OXYGEN SATURATION: 98 % | BODY MASS INDEX: 24.55 KG/M2

## 2021-11-08 DIAGNOSIS — I10 ESSENTIAL HYPERTENSION: Primary | ICD-10-CM

## 2021-11-08 DIAGNOSIS — Z23 NEED FOR TETANUS BOOSTER: ICD-10-CM

## 2021-11-08 DIAGNOSIS — F33.8 SEASONAL AFFECTIVE DISORDER (HCC): ICD-10-CM

## 2021-11-08 PROCEDURE — 99213 OFFICE O/P EST LOW 20 MIN: CPT | Performed by: INTERNAL MEDICINE

## 2021-11-08 PROCEDURE — 3725F SCREEN DEPRESSION PERFORMED: CPT | Performed by: INTERNAL MEDICINE

## 2021-11-08 RX ORDER — ESCITALOPRAM OXALATE 10 MG/1
10 TABLET ORAL DAILY
Qty: 30 TABLET | Refills: 0 | Status: SHIPPED | OUTPATIENT
Start: 2021-11-08 | End: 2021-12-03

## 2021-11-11 ENCOUNTER — APPOINTMENT (EMERGENCY)
Dept: RADIOLOGY | Facility: HOSPITAL | Age: 71
End: 2021-11-11
Payer: COMMERCIAL

## 2021-11-11 ENCOUNTER — HOSPITAL ENCOUNTER (EMERGENCY)
Facility: HOSPITAL | Age: 71
Discharge: HOME | End: 2021-11-11
Attending: EMERGENCY MEDICINE | Admitting: EMERGENCY MEDICINE
Payer: COMMERCIAL

## 2021-11-11 ENCOUNTER — TELEPHONE (OUTPATIENT)
Dept: FAMILY MEDICINE CLINIC | Facility: CLINIC | Age: 71
End: 2021-11-11

## 2021-11-11 ENCOUNTER — APPOINTMENT (EMERGENCY)
Dept: CT IMAGING | Facility: HOSPITAL | Age: 71
End: 2021-11-11
Payer: COMMERCIAL

## 2021-11-11 VITALS
BODY MASS INDEX: 24.5 KG/M2 | HEIGHT: 62 IN | RESPIRATION RATE: 18 BRPM | WEIGHT: 133.16 LBS | DIASTOLIC BLOOD PRESSURE: 70 MMHG | SYSTOLIC BLOOD PRESSURE: 153 MMHG | OXYGEN SATURATION: 98 % | HEART RATE: 78 BPM | TEMPERATURE: 97.5 F

## 2021-11-11 DIAGNOSIS — R42 DIZZINESS: ICD-10-CM

## 2021-11-11 DIAGNOSIS — J32.9 SINUSITIS: Primary | ICD-10-CM

## 2021-11-11 DIAGNOSIS — R63.0 DECREASED APPETITE: ICD-10-CM

## 2021-11-11 DIAGNOSIS — R51.9 HEADACHE: ICD-10-CM

## 2021-11-11 LAB
ALBUMIN SERPL BCP-MCNC: 3.8 G/DL (ref 3.5–5)
ALP SERPL-CCNC: 92 U/L (ref 46–116)
ALT SERPL W P-5'-P-CCNC: 25 U/L (ref 12–78)
ANION GAP SERPL CALCULATED.3IONS-SCNC: 9 MMOL/L (ref 4–13)
AST SERPL W P-5'-P-CCNC: 21 U/L (ref 5–45)
ATRIAL RATE: 73 BPM
BASOPHILS # BLD AUTO: 0.05 THOUSANDS/ΜL (ref 0–0.1)
BASOPHILS NFR BLD AUTO: 1 % (ref 0–1)
BILIRUB SERPL-MCNC: 0.36 MG/DL (ref 0.2–1)
BUN SERPL-MCNC: 12 MG/DL (ref 5–25)
CALCIUM SERPL-MCNC: 9.3 MG/DL (ref 8.3–10.1)
CARDIAC TROPONIN I PNL SERPL HS: 2 NG/L
CHLORIDE SERPL-SCNC: 103 MMOL/L (ref 100–108)
CO2 SERPL-SCNC: 26 MMOL/L (ref 21–32)
CREAT SERPL-MCNC: 0.64 MG/DL (ref 0.6–1.3)
EOSINOPHIL # BLD AUTO: 0.1 THOUSAND/ΜL (ref 0–0.61)
EOSINOPHIL NFR BLD AUTO: 1 % (ref 0–6)
ERYTHROCYTE [DISTWIDTH] IN BLOOD BY AUTOMATED COUNT: 11.9 % (ref 11.6–15.1)
GFR SERPL CREATININE-BSD FRML MDRD: 90 ML/MIN/1.73SQ M
GLUCOSE SERPL-MCNC: 107 MG/DL (ref 65–140)
HCT VFR BLD AUTO: 44.4 % (ref 34.8–46.1)
HGB BLD-MCNC: 14.8 G/DL (ref 11.5–15.4)
IMM GRANULOCYTES # BLD AUTO: 0.02 THOUSAND/UL (ref 0–0.2)
IMM GRANULOCYTES NFR BLD AUTO: 0 % (ref 0–2)
LYMPHOCYTES # BLD AUTO: 2.03 THOUSANDS/ΜL (ref 0.6–4.47)
LYMPHOCYTES NFR BLD AUTO: 25 % (ref 14–44)
MCH RBC QN AUTO: 30 PG (ref 26.8–34.3)
MCHC RBC AUTO-ENTMCNC: 33.3 G/DL (ref 31.4–37.4)
MCV RBC AUTO: 90 FL (ref 82–98)
MONOCYTES # BLD AUTO: 0.61 THOUSAND/ΜL (ref 0.17–1.22)
MONOCYTES NFR BLD AUTO: 8 % (ref 4–12)
NEUTROPHILS # BLD AUTO: 5.22 THOUSANDS/ΜL (ref 1.85–7.62)
NEUTS SEG NFR BLD AUTO: 65 % (ref 43–75)
NRBC BLD AUTO-RTO: 0 /100 WBCS
P AXIS: 59 DEGREES
PLATELET # BLD AUTO: 231 THOUSANDS/UL (ref 149–390)
PMV BLD AUTO: 10.6 FL (ref 8.9–12.7)
POTASSIUM SERPL-SCNC: 3.9 MMOL/L (ref 3.5–5.3)
PR INTERVAL: 190 MS
PROT SERPL-MCNC: 7.9 G/DL (ref 6.4–8.2)
QRS AXIS: -9 DEGREES
QRSD INTERVAL: 88 MS
QT INTERVAL: 398 MS
QTC INTERVAL: 438 MS
RBC # BLD AUTO: 4.94 MILLION/UL (ref 3.81–5.12)
SODIUM SERPL-SCNC: 138 MMOL/L (ref 136–145)
T WAVE AXIS: 30 DEGREES
TSH SERPL DL<=0.05 MIU/L-ACNC: 0.95 UIU/ML (ref 0.36–3.74)
VENTRICULAR RATE: 73 BPM
WBC # BLD AUTO: 8.03 THOUSAND/UL (ref 4.31–10.16)

## 2021-11-11 PROCEDURE — 84484 ASSAY OF TROPONIN QUANT: CPT | Performed by: PHYSICIAN ASSISTANT

## 2021-11-11 PROCEDURE — 93005 ELECTROCARDIOGRAM TRACING: CPT

## 2021-11-11 PROCEDURE — 80053 COMPREHEN METABOLIC PANEL: CPT | Performed by: PHYSICIAN ASSISTANT

## 2021-11-11 PROCEDURE — 70450 CT HEAD/BRAIN W/O DYE: CPT

## 2021-11-11 PROCEDURE — 84443 ASSAY THYROID STIM HORMONE: CPT | Performed by: PHYSICIAN ASSISTANT

## 2021-11-11 PROCEDURE — 36415 COLL VENOUS BLD VENIPUNCTURE: CPT | Performed by: PHYSICIAN ASSISTANT

## 2021-11-11 PROCEDURE — 99284 EMERGENCY DEPT VISIT MOD MDM: CPT

## 2021-11-11 PROCEDURE — 93010 ELECTROCARDIOGRAM REPORT: CPT | Performed by: INTERNAL MEDICINE

## 2021-11-11 PROCEDURE — 71045 X-RAY EXAM CHEST 1 VIEW: CPT

## 2021-11-11 PROCEDURE — 85025 COMPLETE CBC W/AUTO DIFF WBC: CPT | Performed by: PHYSICIAN ASSISTANT

## 2021-11-11 PROCEDURE — 99285 EMERGENCY DEPT VISIT HI MDM: CPT | Performed by: PHYSICIAN ASSISTANT

## 2021-11-11 RX ORDER — AMOXICILLIN AND CLAVULANATE POTASSIUM 875; 125 MG/1; MG/1
1 TABLET, FILM COATED ORAL EVERY 12 HOURS
Qty: 14 TABLET | Refills: 0 | Status: SHIPPED | OUTPATIENT
Start: 2021-11-11 | End: 2021-11-18

## 2021-11-11 RX ORDER — ACETAMINOPHEN 325 MG/1
650 TABLET ORAL ONCE
Status: COMPLETED | OUTPATIENT
Start: 2021-11-11 | End: 2021-11-11

## 2021-11-11 RX ADMIN — ACETAMINOPHEN 650 MG: 325 TABLET, FILM COATED ORAL at 14:43

## 2021-11-12 ENCOUNTER — TELEPHONE (OUTPATIENT)
Dept: FAMILY MEDICINE CLINIC | Facility: CLINIC | Age: 71
End: 2021-11-12

## 2021-11-16 ENCOUNTER — VBI (OUTPATIENT)
Dept: ADMINISTRATIVE | Facility: OTHER | Age: 71
End: 2021-11-16

## 2021-11-16 ENCOUNTER — OFFICE VISIT (OUTPATIENT)
Dept: FAMILY MEDICINE CLINIC | Facility: CLINIC | Age: 71
End: 2021-11-16
Payer: COMMERCIAL

## 2021-11-16 VITALS
DIASTOLIC BLOOD PRESSURE: 70 MMHG | HEIGHT: 62 IN | WEIGHT: 130 LBS | TEMPERATURE: 97.5 F | SYSTOLIC BLOOD PRESSURE: 140 MMHG | OXYGEN SATURATION: 98 % | HEART RATE: 53 BPM | BODY MASS INDEX: 23.92 KG/M2

## 2021-11-16 DIAGNOSIS — F41.9 ANXIETY: Primary | ICD-10-CM

## 2021-11-16 PROCEDURE — 3078F DIAST BP <80 MM HG: CPT | Performed by: FAMILY MEDICINE

## 2021-11-16 PROCEDURE — 99214 OFFICE O/P EST MOD 30 MIN: CPT | Performed by: FAMILY MEDICINE

## 2021-11-16 PROCEDURE — 1160F RVW MEDS BY RX/DR IN RCRD: CPT | Performed by: FAMILY MEDICINE

## 2021-11-16 PROCEDURE — 1036F TOBACCO NON-USER: CPT | Performed by: FAMILY MEDICINE

## 2021-11-16 PROCEDURE — 3008F BODY MASS INDEX DOCD: CPT | Performed by: FAMILY MEDICINE

## 2021-11-16 PROCEDURE — 3077F SYST BP >= 140 MM HG: CPT | Performed by: FAMILY MEDICINE

## 2021-11-19 DIAGNOSIS — I10 ESSENTIAL HYPERTENSION: ICD-10-CM

## 2021-11-19 RX ORDER — LOSARTAN POTASSIUM 50 MG/1
50 TABLET ORAL DAILY
Qty: 90 TABLET | Refills: 1 | Status: SHIPPED | OUTPATIENT
Start: 2021-11-19 | End: 2022-03-14 | Stop reason: SDUPTHER

## 2021-11-19 RX ORDER — LOSARTAN POTASSIUM 50 MG/1
TABLET ORAL
Qty: 30 TABLET | Refills: 2 | Status: SHIPPED | OUTPATIENT
Start: 2021-11-19 | End: 2021-11-19

## 2021-12-01 ENCOUNTER — RA CDI HCC (OUTPATIENT)
Dept: OTHER | Facility: HOSPITAL | Age: 71
End: 2021-12-01

## 2021-12-03 DIAGNOSIS — F33.8 SEASONAL AFFECTIVE DISORDER (HCC): ICD-10-CM

## 2021-12-03 RX ORDER — ESCITALOPRAM OXALATE 10 MG/1
TABLET ORAL
Qty: 30 TABLET | Refills: 0 | Status: SHIPPED | OUTPATIENT
Start: 2021-12-03 | End: 2022-01-02

## 2021-12-08 ENCOUNTER — OFFICE VISIT (OUTPATIENT)
Dept: FAMILY MEDICINE CLINIC | Facility: CLINIC | Age: 71
End: 2021-12-08
Payer: COMMERCIAL

## 2021-12-08 VITALS
HEART RATE: 85 BPM | OXYGEN SATURATION: 99 % | HEIGHT: 62 IN | DIASTOLIC BLOOD PRESSURE: 78 MMHG | BODY MASS INDEX: 24.66 KG/M2 | WEIGHT: 134 LBS | SYSTOLIC BLOOD PRESSURE: 122 MMHG | TEMPERATURE: 97.3 F

## 2021-12-08 DIAGNOSIS — Z78.0 ASYMPTOMATIC POSTMENOPAUSAL STATE: ICD-10-CM

## 2021-12-08 DIAGNOSIS — I10 ESSENTIAL HYPERTENSION: ICD-10-CM

## 2021-12-08 DIAGNOSIS — Z23 NEED FOR TDAP VACCINATION: ICD-10-CM

## 2021-12-08 DIAGNOSIS — Z12.31 ENCOUNTER FOR SCREENING MAMMOGRAM FOR BREAST CANCER: ICD-10-CM

## 2021-12-08 DIAGNOSIS — Z00.00 MEDICARE ANNUAL WELLNESS VISIT, SUBSEQUENT: Primary | ICD-10-CM

## 2021-12-08 DIAGNOSIS — E04.1 NONTOXIC SINGLE THYROID NODULE: ICD-10-CM

## 2021-12-08 DIAGNOSIS — E53.8 VITAMIN B12 DEFICIENCY: ICD-10-CM

## 2021-12-08 DIAGNOSIS — F41.9 ANXIETY: ICD-10-CM

## 2021-12-08 DIAGNOSIS — Z12.31 BREAST CANCER SCREENING BY MAMMOGRAM: ICD-10-CM

## 2021-12-08 DIAGNOSIS — M81.0 OSTEOPOROSIS OF LUMBAR SPINE: ICD-10-CM

## 2021-12-08 DIAGNOSIS — E78.5 HYPERLIPIDEMIA, UNSPECIFIED HYPERLIPIDEMIA TYPE: ICD-10-CM

## 2021-12-08 DIAGNOSIS — E55.9 VITAMIN D DEFICIENCY: ICD-10-CM

## 2021-12-08 PROCEDURE — 3008F BODY MASS INDEX DOCD: CPT | Performed by: PHYSICIAN ASSISTANT

## 2021-12-08 PROCEDURE — 1125F AMNT PAIN NOTED PAIN PRSNT: CPT | Performed by: PHYSICIAN ASSISTANT

## 2021-12-08 PROCEDURE — 99214 OFFICE O/P EST MOD 30 MIN: CPT | Performed by: PHYSICIAN ASSISTANT

## 2021-12-08 PROCEDURE — 1036F TOBACCO NON-USER: CPT | Performed by: PHYSICIAN ASSISTANT

## 2021-12-08 PROCEDURE — 3725F SCREEN DEPRESSION PERFORMED: CPT | Performed by: PHYSICIAN ASSISTANT

## 2021-12-08 PROCEDURE — 3078F DIAST BP <80 MM HG: CPT | Performed by: PHYSICIAN ASSISTANT

## 2021-12-08 PROCEDURE — G0439 PPPS, SUBSEQ VISIT: HCPCS | Performed by: PHYSICIAN ASSISTANT

## 2021-12-08 PROCEDURE — 3288F FALL RISK ASSESSMENT DOCD: CPT | Performed by: PHYSICIAN ASSISTANT

## 2021-12-08 PROCEDURE — 1160F RVW MEDS BY RX/DR IN RCRD: CPT | Performed by: PHYSICIAN ASSISTANT

## 2021-12-08 PROCEDURE — 1170F FXNL STATUS ASSESSED: CPT | Performed by: PHYSICIAN ASSISTANT

## 2021-12-08 PROCEDURE — 3074F SYST BP LT 130 MM HG: CPT | Performed by: PHYSICIAN ASSISTANT

## 2021-12-08 RX ORDER — TETANUS TOXOID, REDUCED DIPHTHERIA TOXOID AND ACELLULAR PERTUSSIS VACCINE, ADSORBED 5; 2.5; 8; 8; 2.5 [IU]/.5ML; [IU]/.5ML; UG/.5ML; UG/.5ML; UG/.5ML
0.5 SUSPENSION INTRAMUSCULAR ONCE
Qty: 0.5 ML | Refills: 0 | Status: SHIPPED | OUTPATIENT
Start: 2021-12-08 | End: 2021-12-08

## 2021-12-08 RX ORDER — DIPHENOXYLATE HYDROCHLORIDE AND ATROPINE SULFATE 2.5; .025 MG/1; MG/1
1 TABLET ORAL DAILY
COMMUNITY

## 2021-12-20 DIAGNOSIS — E78.5 HYPERLIPIDEMIA, UNSPECIFIED HYPERLIPIDEMIA TYPE: ICD-10-CM

## 2021-12-20 RX ORDER — ATORVASTATIN CALCIUM 20 MG/1
TABLET, FILM COATED ORAL
Qty: 90 TABLET | Refills: 0 | Status: SHIPPED | OUTPATIENT
Start: 2021-12-20 | End: 2022-03-14 | Stop reason: SDUPTHER

## 2022-01-02 DIAGNOSIS — F33.8 SEASONAL AFFECTIVE DISORDER (HCC): ICD-10-CM

## 2022-01-02 RX ORDER — ESCITALOPRAM OXALATE 10 MG/1
TABLET ORAL
Qty: 30 TABLET | Refills: 0 | Status: SHIPPED | OUTPATIENT
Start: 2022-01-02 | End: 2022-02-03

## 2022-01-07 ENCOUNTER — VBI (OUTPATIENT)
Dept: ADMINISTRATIVE | Facility: OTHER | Age: 72
End: 2022-01-07

## 2022-02-03 DIAGNOSIS — F33.8 SEASONAL AFFECTIVE DISORDER (HCC): ICD-10-CM

## 2022-02-03 RX ORDER — ESCITALOPRAM OXALATE 10 MG/1
TABLET ORAL
Qty: 30 TABLET | Refills: 0 | Status: SHIPPED | OUTPATIENT
Start: 2022-02-03 | End: 2022-03-04

## 2022-03-04 DIAGNOSIS — F33.8 SEASONAL AFFECTIVE DISORDER (HCC): ICD-10-CM

## 2022-03-04 RX ORDER — ESCITALOPRAM OXALATE 10 MG/1
TABLET ORAL
Qty: 30 TABLET | Refills: 0 | Status: SHIPPED | OUTPATIENT
Start: 2022-03-04 | End: 2022-03-14 | Stop reason: SDUPTHER

## 2022-03-08 ENCOUNTER — RA CDI HCC (OUTPATIENT)
Dept: OTHER | Facility: HOSPITAL | Age: 72
End: 2022-03-08

## 2022-03-08 NOTE — PROGRESS NOTES
[facility] 75  coding opportunities       Chart reviewed, no opportunity found: CHART REVIEWED, NO OPPORTUNITY FOUND                        Patients insurance company: [employer] (Medicare Advantage and Commercial)

## 2022-03-13 NOTE — PROGRESS NOTES
FAMILY PRACTICE OFFICE VISIT  [facility] - [facility]       NAME: Antonio Nieves  AGE: 70 y o  SEX: female       : 1950        MRN: [id number]    DATE: 3/15/2022  TIME: 11:35 AM    Assessment and Plan     Problem List Items Addressed This Visit        Cardiovascular and Mediastinum    Essential hypertension - Primary     Uncontrolled  She will increase losartan 100 mg daily  Recheck BMP in 2 weeks  Encouraged to monitor blood pressure at home and follow up within 1 month  Call with any problems or concerns in the interim  Continue to limit intake of caffeine and salt  Relevant Medications    losartan (COZAAR) 100 MG tablet    Other Relevant Orders    Basic metabolic panel       Other    Hyperlipidemia     Well controlled with an LDL of 89 in 2021  Continue atorvastatin 20 mg daily  Will continue to monitor  Relevant Medications    atorvastatin (LIPITOR) 20 mg tablet    Anxiety     Well controlled  She is still debating weaning off of Lexapro  We discussed that if she wants to start the process, she could cut her tablets in half so that she is taking 5 mg daily instead of her current 10 mg daily  Will continue to monitor  She should reach out with any problems or concerns prior to her next visit  Other Visit Diagnoses     Seasonal affective disorder ([facility] [address] )        Relevant Medications    escitalopram (LEXAPRO) 10 mg tablet          Essential hypertension  Uncontrolled  She will increase losartan 100 mg daily  Recheck BMP in 2 weeks  Encouraged to monitor blood pressure at home and follow up within 1 month  Call with any problems or concerns in the interim  Continue to limit intake of caffeine and salt  Hyperlipidemia  Well controlled with an LDL of 89 in 2021  Continue atorvastatin 20 mg daily  Will continue to monitor  Anxiety  Well controlled  She is still debating weaning off of Lexapro    We discussed that if she wants to start the process, she could cut her tablets in half so that she is taking 5 mg daily instead of her current 10 mg daily  Will continue to monitor  She should reach out with any problems or concerns prior to her next visit  Chief Complaint     Chief Complaint   Patient presents with    Follow-up     3 months fallow up       History of Present Illness   Antonio Danielson is a 70y o -year-old female who presents for 3 month follow-up on anxiety  The patient reports that recently anxiety/depression level has been improved since starting the Lexapro  Daily medication includes Lexapro 10 mg daily  Care team does not include a therapist - and notes that she is not interested currently  The patient denies panic attacks  The patient denies SI/HI  Today's PHQ9 score was 5  She is still considering weaning off  She notes that she has been seeing higher BP readings over the last year  She thinks it is from surgery or booster COVID vaccine but confirms other stressors like sister with cancer  She notes that she has seen 120s-150s  She avoids salt and only has 1 coffee cup daily  She takes losartan 50 mg daily in the morning including today  She does confirm high blood in family  Review of Systems   Review of Systems   Constitutional: Negative for chills and fever  Respiratory: Negative for shortness of breath  Cardiovascular: Negative for chest pain, palpitations and leg swelling  Neurological: Negative for dizziness and headaches  Psychiatric/Behavioral: Negative for dysphoric mood and suicidal ideas  The patient is not nervous/anxious          Active Problem List     Patient Active Problem List   Diagnosis    Fatigue    Hyperlipidemia    Microscopic hematuria    Nontoxic single thyroid nodule    Osteoporosis    Positive PPD    Sciatica    Simple goiter    Vitamin B12 deficiency    Vitamin D deficiency    Callus of foot    Plantar wart of both feet    Dizziness    Paresthesia of left arm    Anxiety    Heel pain, chronic, left    Osteoporosis of lumbar spine    Costochondritis    Essential hypertension         Past Medical History:  Past Medical History:   Diagnosis Date    Anemia     last assessed: 5/27/2016    Anxiety     last assessed: 11/16/2015    Esophageal reflux     resolved: 2/28/2017    External hemorrhoids     last assessed: 11/20/2012    Migraine     resolved after went through menopause       Past Surgical History:  Past Surgical History:   Procedure Laterality Date    CATARACT EXTRACTION, BILATERAL Bilateral     around March or July 2021    ESOPHAGOGASTRODUODENOSCOPY  2015    diagnostic; neg    FINE NEEDLE ASPIRATION  2013    thyroid nodule-negative for malignancy       Family History:  Family History   Problem Relation Age of Onset    Diabetes Mother         mellitus    Diabetes Father         mellitus    Arthritis Sister     Kidney disease Sister         kidneys are "shrinking" due to lots of protein    Thyroid disease Sister         disorder    Hypertension Sister     Breast cancer Sister     No Known Problems Sister     No Known Problems Sister     Osteoporosis Sister     No Known Problems Sister     Arthritis Sister     Glaucoma Sister     No Known Problems Daughter     No Known Problems Daughter     No Known Problems Maternal Grandmother     No Known Problems Maternal Grandfather     No Known Problems Paternal Grandmother     No Known Problems Paternal Grandfather     No Known Problems Maternal Aunt     No Known Problems Paternal Aunt     Breast cancer Family        Social History:  Social History     Socioeconomic History    Marital status: /Civil Union     Spouse name: Not on file    Number of children: Not on file    Years of education: Not on file    Highest education level: Not on file   Occupational History    Occupation: teacher   Tobacco Use    Smoking status: Never Smoker    Smokeless tobacco: Never Used   Vaping Use    Vaping Use: Never used   Substance and Sexual Activity    Alcohol use: Never    Drug use: Never    Sexual activity: Not on file   Other Topics Concern    Not on file   Social History Narrative    Denied: history of caffeine use     Social Determinants of Health     Financial Resource Strain: Not on file   Food Insecurity: Not on file   Transportation Needs: Not on file   Physical Activity: Not on file   Stress: Not on file   Social Connections: Not on file   Intimate Partner Violence: Not on file   Housing Stability: Not on file       Objective     Vitals:    03/14/22 0753 03/14/22 0832   BP: 150/80 146/82   BP Location: Left arm    Patient Position: Sitting    Cuff Size: Standard    Pulse: 80    Temp: 97 5 °F (36 4 °C)    SpO2: 98%    Weight: 64 kg (141 lb)    Height: 5' 2" (1 575 m)      Wt Readings from Last 3 Encounters:   03/14/22 64 kg (141 lb)   12/08/21 60 8 kg (134 lb)   11/16/21 59 kg (130 lb)       Physical Exam  Vitals reviewed  Constitutional:       General: She is not in acute distress  Appearance: Normal appearance  She is well-developed  She is not ill-appearing  HENT:      Head: Normocephalic and atraumatic  Neck:      Thyroid: No thyromegaly  Cardiovascular:      Rate and Rhythm: Normal rate and regular rhythm  Pulses: Normal pulses  Heart sounds: Normal heart sounds  No murmur heard  Comments: No carotid bruits noted  Pulmonary:      Effort: Pulmonary effort is normal       Breath sounds: Normal breath sounds  No wheezing, rhonchi or rales  Musculoskeletal:      Cervical back: Neck supple  Right lower leg: No edema  Left lower leg: No edema  Lymphadenopathy:      Cervical: No cervical adenopathy  Neurological:      Mental Status: She is alert  Psychiatric:         Mood and Affect: Mood normal          Behavior: Behavior normal          Thought Content:  Thought content normal          Judgment: Judgment normal          Pertinent Laboratory/Diagnostic Studies:  Lab Results   Component Value Date    GLUCOSE 113 11/19/2015    BUN 12 11/11/2021    CREATININE 0 64 11/11/2021    CALCIUM 9 3 11/11/2021     03/06/2017    K 3 9 11/11/2021    CO2 26 11/11/2021     11/11/2021     Lab Results   Component Value Date    ALT 25 11/11/2021    AST 21 11/11/2021    ALKPHOS 92 11/11/2021    BILITOT 0 3 03/06/2017       Lab Results   Component Value Date    WBC 8 03 11/11/2021    HGB 14 8 11/11/2021    HCT 44 4 11/11/2021    MCV 90 11/11/2021     11/11/2021     Lab Results   Component Value Date    CHOL 188 08/30/2016     Lab Results   Component Value Date    TRIG 85 10/14/2021     Lab Results   Component Value Date    HDL 62 10/14/2021     Lab Results   Component Value Date    LDLCALC 89 10/14/2021     Results for orders placed or performed during the hospital encounter of 11/11/21   CBC and differential   Result Value Ref Range    WBC 8 03 4 31 - 10 16 Thousand/uL    RBC 4 94 3 81 - 5 12 Million/uL    Hemoglobin 14 8 11 5 - 15 4 g/dL    Hematocrit 44 4 34 8 - 46 1 %    MCV 90 82 - 98 fL    MCH 30 0 26 8 - 34 3 pg    MCHC 33 3 31 4 - 37 4 g/dL    RDW 11 9 11 6 - 15 1 %    MPV 10 6 8 9 - 12 7 fL    Platelets 978 399 - 166 Thousands/uL    nRBC 0 /100 WBCs    Neutrophils Relative 65 43 - 75 %    Immat GRANS % 0 0 - 2 %    Lymphocytes Relative 25 14 - 44 %    Monocytes Relative 8 4 - 12 %    Eosinophils Relative 1 0 - 6 %    Basophils Relative 1 0 - 1 %    Neutrophils Absolute 5 22 1 85 - 7 62 Thousands/µL    Immature Grans Absolute 0 02 0 00 - 0 20 Thousand/uL    Lymphocytes Absolute 2 03 0 60 - 4 47 Thousands/µL    Monocytes Absolute 0 61 0 17 - 1 22 Thousand/µL    Eosinophils Absolute 0 10 0 00 - 0 61 Thousand/µL    Basophils Absolute 0 05 0 00 - 0 10 Thousands/µL   Comprehensive metabolic panel   Result Value Ref Range    Sodium 138 136 - 145 mmol/L    Potassium 3 9 3 5 - 5 3 mmol/L    Chloride 103 100 - 108 mmol/L CO2 26 21 - 32 mmol/L    ANION GAP 9 4 - 13 mmol/L    BUN 12 5 - 25 mg/dL    Creatinine 0 64 0 60 - 1 30 mg/dL    Glucose 107 65 - 140 mg/dL    Calcium 9 3 8 3 - 10 1 mg/dL    AST 21 5 - 45 U/L    ALT 25 12 - 78 U/L    Alkaline Phosphatase 92 46 - 116 U/L    Total Protein 7 9 6 4 - 8 2 g/dL    Albumin 3 8 3 5 - 5 0 g/dL    Total Bilirubin 0 36 0 20 - 1 00 mg/dL    eGFR 90 ml/min/1 73sq m   TSH   Result Value Ref Range    TSH 3RD GENERATON 0 955 0 358 - 3 740 uIU/mL   HS Troponin 0hr (reflex protocol)   Result Value Ref Range    hs TnI 0hr 2 "Refer to ACS Flowchart"- see link ng/L   ECG 12 lead   Result Value Ref Range    Ventricular Rate 73 BPM    Atrial Rate 73 BPM    IL Interval 190 ms    QRSD Interval 88 ms    QT Interval 398 ms    QTC Interval 438 ms    P Dennis 59 degrees    QRS Axis -9 degrees    T Wave Axis 30 degrees         ALLERGIES:  Allergies   Allergen Reactions    Fosamax [Alendronate] Vomiting and Abdominal Pain       Current Medications     Current Outpatient Medications   Medication Sig Dispense Refill    atorvastatin (LIPITOR) 20 mg tablet Take 1 tablet (20 mg total) by mouth daily 90 tablet 1    calcium carbonate (CALCIUM 600) 600 MG tablet Take 1 tablet (600 mg total) by mouth 2 (two) times a day with meals      cholecalciferol (VITAMIN D3) 1,000 units tablet Take 1 tablet by mouth daily      escitalopram (LEXAPRO) 10 mg tablet Take 1 tablet (10 mg total) by mouth daily 90 tablet 1    losartan (COZAAR) 100 MG tablet Take 1 tablet (100 mg total) by mouth daily 90 tablet 1    multivitamin (THERAGRAN) TABS Take 1 tablet by mouth daily       No current facility-administered medications for this visit           Health Maintenance     Health Maintenance   Topic Date Due    BMI: Followup Plan  Never done    Colorectal Cancer Screening  Never done    Breast Cancer Screening: Mammogram  02/05/2021    DXA SCAN  02/07/2022    DTaP,Tdap,and Td Vaccines (2 - Td or Tdap) 11/08/2022 (Originally 5/12/2019)    Cervical Cancer Screening  11/08/2022 (Originally 3/21/1971)    Fall Risk  12/08/2022    Medicare Annual Wellness Visit (AWV)  12/08/2022    Depression Screening  03/14/2023    BMI: Adult  03/14/2023    Depression Follow-up Plan  03/14/2023    Hepatitis C Screening  Completed    Osteoporosis Screening  Completed    Pneumococcal Vaccine: 65+ Years  Completed    Influenza Vaccine  Completed    COVID-19 Vaccine  Completed    HIB Vaccine  Aged Out    Hepatitis B Vaccine  Aged Out    IPV Vaccine  Aged Out    Hepatitis A Vaccine  Aged Out    Meningococcal ACWY Vaccine  Aged Out    HPV Vaccine  Aged Out     Immunization History   Administered Date(s) Administered    COVID-19 MODERNA VACC 0 5 ML IM 02/20/2021, 03/06/2021, 11/08/2021    H1N1, All Formulations 10/29/2009    Hep A, adult 05/12/2009    INFLUENZA 12/05/2006    Influenza Split High Dose Preservative Free IM 09/01/2016, 10/05/2017    Influenza, high dose seasonal 0 7 mL 11/27/2018, 10/07/2019, 10/07/2020, 10/05/2021    Influenza, seasonal, injectable 12/19/2005, 12/10/2007, 11/08/2008, 10/11/2012, 09/26/2014, 09/16/2015    Pneumococcal Conjugate 13-Valent 02/28/2017    Pneumococcal Polysaccharide PPV23 06/22/2018    Tdap 05/12/2009    Tuberculin Skin Test-PPD Intradermal 04/01/1998       Nicholas Mora PA-C  3/15/2022 11:35 AM  [facility]

## 2022-03-14 ENCOUNTER — OFFICE VISIT (OUTPATIENT)
Dept: FAMILY MEDICINE CLINIC | Facility: CLINIC | Age: 72
End: 2022-03-14
Payer: COMMERCIAL

## 2022-03-14 VITALS
TEMPERATURE: 97.5 F | WEIGHT: 141 LBS | HEIGHT: 62 IN | OXYGEN SATURATION: 98 % | DIASTOLIC BLOOD PRESSURE: 82 MMHG | BODY MASS INDEX: 25.95 KG/M2 | HEART RATE: 80 BPM | SYSTOLIC BLOOD PRESSURE: 146 MMHG

## 2022-03-14 DIAGNOSIS — F33.8 SEASONAL AFFECTIVE DISORDER (HCC): ICD-10-CM

## 2022-03-14 DIAGNOSIS — F41.9 ANXIETY: ICD-10-CM

## 2022-03-14 DIAGNOSIS — I10 ESSENTIAL HYPERTENSION: Primary | ICD-10-CM

## 2022-03-14 DIAGNOSIS — E78.5 HYPERLIPIDEMIA, UNSPECIFIED HYPERLIPIDEMIA TYPE: ICD-10-CM

## 2022-03-14 PROCEDURE — 3725F SCREEN DEPRESSION PERFORMED: CPT | Performed by: PHYSICIAN ASSISTANT

## 2022-03-14 PROCEDURE — 3077F SYST BP >= 140 MM HG: CPT | Performed by: PHYSICIAN ASSISTANT

## 2022-03-14 PROCEDURE — 3079F DIAST BP 80-89 MM HG: CPT | Performed by: PHYSICIAN ASSISTANT

## 2022-03-14 PROCEDURE — 99214 OFFICE O/P EST MOD 30 MIN: CPT | Performed by: PHYSICIAN ASSISTANT

## 2022-03-14 PROCEDURE — 1160F RVW MEDS BY RX/DR IN RCRD: CPT | Performed by: PHYSICIAN ASSISTANT

## 2022-03-14 PROCEDURE — 3008F BODY MASS INDEX DOCD: CPT | Performed by: PHYSICIAN ASSISTANT

## 2022-03-14 PROCEDURE — 1036F TOBACCO NON-USER: CPT | Performed by: PHYSICIAN ASSISTANT

## 2022-03-14 RX ORDER — LOSARTAN POTASSIUM 100 MG/1
100 TABLET ORAL DAILY
Qty: 90 TABLET | Refills: 1 | Status: SHIPPED | OUTPATIENT
Start: 2022-03-14 | End: 2022-08-02 | Stop reason: SDUPTHER

## 2022-03-14 RX ORDER — ATORVASTATIN CALCIUM 20 MG/1
20 TABLET, FILM COATED ORAL DAILY
Qty: 90 TABLET | Refills: 1 | Status: SHIPPED | OUTPATIENT
Start: 2022-03-14 | End: 2022-08-02 | Stop reason: SDUPTHER

## 2022-03-14 RX ORDER — ESCITALOPRAM OXALATE 10 MG/1
10 TABLET ORAL DAILY
Qty: 90 TABLET | Refills: 1 | Status: SHIPPED | OUTPATIENT
Start: 2022-03-14

## 2022-04-07 LAB
BUN SERPL-MCNC: 20 MG/DL (ref 7–25)
BUN/CREAT SERPL: NORMAL (CALC) (ref 6–22)
CALCIUM SERPL-MCNC: 9.6 MG/DL (ref 8.6–10.4)
CHLORIDE SERPL-SCNC: 103 MMOL/L (ref 98–110)
CO2 SERPL-SCNC: 31 MMOL/L (ref 20–32)
CREAT SERPL-MCNC: 0.71 MG/DL (ref 0.6–0.93)
GLUCOSE SERPL-MCNC: 98 MG/DL (ref 65–99)
POTASSIUM SERPL-SCNC: 4.9 MMOL/L (ref 3.5–5.3)
SL AMB EGFR AFRICAN AMERICAN: 99 ML/MIN/1.73M2
SL AMB EGFR NON AFRICAN AMERICAN: 85 ML/MIN/1.73M2
SODIUM SERPL-SCNC: 137 MMOL/L (ref 135–146)

## 2022-04-14 ENCOUNTER — VBI (OUTPATIENT)
Dept: ADMINISTRATIVE | Facility: OTHER | Age: 72
End: 2022-04-14

## 2022-04-27 ENCOUNTER — VBI (OUTPATIENT)
Dept: ADMINISTRATIVE | Facility: OTHER | Age: 72
End: 2022-04-27

## 2022-06-24 ENCOUNTER — VBI (OUTPATIENT)
Dept: ADMINISTRATIVE | Facility: OTHER | Age: 72
End: 2022-06-24

## 2022-06-28 ENCOUNTER — VBI (OUTPATIENT)
Dept: ADMINISTRATIVE | Facility: OTHER | Age: 72
End: 2022-06-28

## 2022-08-02 ENCOUNTER — OFFICE VISIT (OUTPATIENT)
Dept: FAMILY MEDICINE CLINIC | Facility: CLINIC | Age: 72
End: 2022-08-02
Payer: COMMERCIAL

## 2022-08-02 VITALS
BODY MASS INDEX: 25.58 KG/M2 | SYSTOLIC BLOOD PRESSURE: 140 MMHG | OXYGEN SATURATION: 97 % | DIASTOLIC BLOOD PRESSURE: 80 MMHG | HEART RATE: 79 BPM | HEIGHT: 62 IN | TEMPERATURE: 98.7 F | WEIGHT: 139 LBS

## 2022-08-02 DIAGNOSIS — M54.50 ACUTE BILATERAL LOW BACK PAIN WITHOUT SCIATICA: ICD-10-CM

## 2022-08-02 DIAGNOSIS — E78.5 HYPERLIPIDEMIA, UNSPECIFIED HYPERLIPIDEMIA TYPE: ICD-10-CM

## 2022-08-02 DIAGNOSIS — L84 FOOT CALLUS: ICD-10-CM

## 2022-08-02 DIAGNOSIS — I10 ESSENTIAL HYPERTENSION: ICD-10-CM

## 2022-08-02 DIAGNOSIS — T75.3XXA MOTION SICKNESS, INITIAL ENCOUNTER: Primary | ICD-10-CM

## 2022-08-02 PROCEDURE — 3077F SYST BP >= 140 MM HG: CPT | Performed by: INTERNAL MEDICINE

## 2022-08-02 PROCEDURE — 1160F RVW MEDS BY RX/DR IN RCRD: CPT | Performed by: INTERNAL MEDICINE

## 2022-08-02 PROCEDURE — 99214 OFFICE O/P EST MOD 30 MIN: CPT | Performed by: INTERNAL MEDICINE

## 2022-08-02 PROCEDURE — 3079F DIAST BP 80-89 MM HG: CPT | Performed by: INTERNAL MEDICINE

## 2022-08-02 RX ORDER — LOSARTAN POTASSIUM 100 MG/1
100 TABLET ORAL DAILY
Qty: 90 TABLET | Refills: 1 | Status: SHIPPED | OUTPATIENT
Start: 2022-08-02

## 2022-08-02 RX ORDER — PROMETHAZINE HYDROCHLORIDE 25 MG/1
25 TABLET ORAL EVERY 8 HOURS PRN
Qty: 30 TABLET | Refills: 0 | Status: SHIPPED | OUTPATIENT
Start: 2022-08-02

## 2022-08-02 RX ORDER — ATORVASTATIN CALCIUM 20 MG/1
20 TABLET, FILM COATED ORAL DAILY
Qty: 90 TABLET | Refills: 1 | Status: SHIPPED | OUTPATIENT
Start: 2022-08-02

## 2022-08-02 RX ORDER — CELECOXIB 200 MG/1
200 CAPSULE ORAL 2 TIMES DAILY PRN
Qty: 60 CAPSULE | Refills: 0 | Status: SHIPPED | OUTPATIENT
Start: 2022-08-02

## 2022-08-02 NOTE — ASSESSMENT & PLAN NOTE
She reports lower back pain with no sciatica  Overall it is improving  She is planning for long flight  Will start her on Celebrex 200 mg b i d  p r n  Abelino Ahmeek

## 2022-08-02 NOTE — PROGRESS NOTES
Assessment/Plan:    Essential hypertension  Overall relatively well controlled on her losartan  Continue the same  Hyperlipidemia  Tolerate statins well  Continue the same  Acute bilateral low back pain without sciatica  She reports lower back pain with no sciatica  Overall it is improving  She is planning for long flight  Will start her on Celebrex 200 mg b i d  p r n       Foot callus  Refer to podiatry  Motion sickness  She is planning for long flight  Will start promethazine 25 mg q 8 hours p r n  for motion sickness  Diagnoses and all orders for this visit:    Motion sickness, initial encounter  -     promethazine (PHENERGAN) 25 mg tablet; Take 1 tablet (25 mg total) by mouth every 8 (eight) hours as needed for nausea or vomiting    Acute bilateral low back pain without sciatica  -     celecoxib (CeleBREX) 200 mg capsule; Take 1 capsule (200 mg total) by mouth 2 (two) times a day as needed for mild pain    Hyperlipidemia, unspecified hyperlipidemia type  -     atorvastatin (LIPITOR) 20 mg tablet; Take 1 tablet (20 mg total) by mouth daily    Essential hypertension  -     losartan (COZAAR) 100 MG tablet; Take 1 tablet (100 mg total) by mouth daily    Foot callus  -     Ambulatory Referral to Podiatry; Future          Subjective:      Patient ID: Sally Ellison is a 67 y o  female  Patient came today to address multiple problems including hypertension, foot calluses, chronic back pain and anxiety  The following portions of the patient's history were reviewed and updated as appropriate: allergies, current medications, past family history, past medical history, past social history, past surgical history, and problem list     Review of Systems   Constitutional: Negative for chills and fever  Genitourinary: Negative for decreased urine volume, dysuria, flank pain, frequency and urgency  Musculoskeletal: Positive for back pain  Negative for arthralgias and joint swelling     Skin: Bilateral plantar calluses   Neurological: Negative for weakness and numbness  Psychiatric/Behavioral: Negative for confusion  Objective:      /80 (BP Location: Left arm, Patient Position: Sitting, Cuff Size: Standard)   Pulse 79   Temp 98 7 °F (37 1 °C)   Ht 5' 2" (1 575 m)   Wt 63 kg (139 lb)   SpO2 97%   BMI 25 42 kg/m²     Allergies   Allergen Reactions    Fosamax [Alendronate] Vomiting and Abdominal Pain          Current Outpatient Medications:     atorvastatin (LIPITOR) 20 mg tablet, Take 1 tablet (20 mg total) by mouth daily, Disp: 90 tablet, Rfl: 1    calcium carbonate (CALCIUM 600) 600 MG tablet, Take 1 tablet (600 mg total) by mouth 2 (two) times a day with meals, Disp: , Rfl:     celecoxib (CeleBREX) 200 mg capsule, Take 1 capsule (200 mg total) by mouth 2 (two) times a day as needed for mild pain, Disp: 60 capsule, Rfl: 0    cholecalciferol (VITAMIN D3) 1,000 units tablet, Take 1 tablet by mouth daily, Disp: , Rfl:     escitalopram (LEXAPRO) 10 mg tablet, Take 1 tablet (10 mg total) by mouth daily, Disp: 90 tablet, Rfl: 1    losartan (COZAAR) 100 MG tablet, Take 1 tablet (100 mg total) by mouth daily, Disp: 90 tablet, Rfl: 1    multivitamin (THERAGRAN) TABS, Take 1 tablet by mouth daily, Disp: , Rfl:     promethazine (PHENERGAN) 25 mg tablet, Take 1 tablet (25 mg total) by mouth every 8 (eight) hours as needed for nausea or vomiting, Disp: 30 tablet, Rfl: 0     Patient Instructions   Switch losartan to at night  Take promethazine for motion sickness as needed every 8 h             Physical Exam

## 2022-08-02 NOTE — ASSESSMENT & PLAN NOTE
She is planning for long flight  Will start promethazine 25 mg q 8 hours p r n  for motion sickness

## 2022-08-15 ENCOUNTER — VBI (OUTPATIENT)
Dept: ADMINISTRATIVE | Facility: OTHER | Age: 72
End: 2022-08-15

## 2022-08-17 ENCOUNTER — VBI (OUTPATIENT)
Dept: ADMINISTRATIVE | Facility: OTHER | Age: 72
End: 2022-08-17

## 2022-08-21 DIAGNOSIS — F33.8 SEASONAL AFFECTIVE DISORDER (HCC): ICD-10-CM

## 2022-08-21 RX ORDER — ESCITALOPRAM OXALATE 10 MG/1
TABLET ORAL
Qty: 90 TABLET | Refills: 0 | Status: SHIPPED | OUTPATIENT
Start: 2022-08-21

## 2022-08-30 ENCOUNTER — TELEPHONE (OUTPATIENT)
Dept: FAMILY MEDICINE CLINIC | Facility: CLINIC | Age: 72
End: 2022-08-30

## 2022-09-06 ENCOUNTER — VBI (OUTPATIENT)
Dept: ADMINISTRATIVE | Facility: OTHER | Age: 72
End: 2022-09-06

## 2022-09-22 ENCOUNTER — VBI (OUTPATIENT)
Dept: ADMINISTRATIVE | Facility: OTHER | Age: 72
End: 2022-09-22

## 2022-09-28 ENCOUNTER — VBI (OUTPATIENT)
Dept: ADMINISTRATIVE | Facility: OTHER | Age: 72
End: 2022-09-28

## 2022-10-28 ENCOUNTER — VBI (OUTPATIENT)
Dept: ADMINISTRATIVE | Facility: OTHER | Age: 72
End: 2022-10-28

## 2022-11-18 ENCOUNTER — VBI (OUTPATIENT)
Dept: ADMINISTRATIVE | Facility: OTHER | Age: 72
End: 2022-11-18

## 2022-11-30 ENCOUNTER — VBI (OUTPATIENT)
Dept: ADMINISTRATIVE | Facility: OTHER | Age: 72
End: 2022-11-30

## 2022-12-02 ENCOUNTER — VBI (OUTPATIENT)
Dept: ADMINISTRATIVE | Facility: OTHER | Age: 72
End: 2022-12-02

## 2022-12-09 ENCOUNTER — CLINICAL SUPPORT (OUTPATIENT)
Dept: FAMILY MEDICINE CLINIC | Facility: CLINIC | Age: 72
End: 2022-12-09

## 2022-12-09 DIAGNOSIS — Z23 NEED FOR VACCINATION: Primary | ICD-10-CM

## 2022-12-22 ENCOUNTER — VBI (OUTPATIENT)
Dept: ADMINISTRATIVE | Facility: OTHER | Age: 72
End: 2022-12-22

## 2023-01-05 ENCOUNTER — VBI (OUTPATIENT)
Dept: ADMINISTRATIVE | Facility: OTHER | Age: 73
End: 2023-01-05

## 2023-01-09 ENCOUNTER — VBI (OUTPATIENT)
Dept: ADMINISTRATIVE | Facility: OTHER | Age: 73
End: 2023-01-09

## 2023-01-12 ENCOUNTER — VBI (OUTPATIENT)
Dept: ADMINISTRATIVE | Facility: OTHER | Age: 73
End: 2023-01-12

## 2023-02-02 ENCOUNTER — HOSPITAL ENCOUNTER (OUTPATIENT)
Dept: MAMMOGRAPHY | Facility: CLINIC | Age: 73
End: 2023-02-02

## 2023-02-02 DIAGNOSIS — N64.4 BREAST PAIN, LEFT: ICD-10-CM

## 2023-04-24 ENCOUNTER — OFFICE VISIT (OUTPATIENT)
Dept: FAMILY MEDICINE CLINIC | Facility: CLINIC | Age: 73
End: 2023-04-24

## 2023-04-24 VITALS
SYSTOLIC BLOOD PRESSURE: 120 MMHG | HEIGHT: 62 IN | BODY MASS INDEX: 25.98 KG/M2 | DIASTOLIC BLOOD PRESSURE: 70 MMHG | WEIGHT: 141.2 LBS | TEMPERATURE: 96.7 F

## 2023-04-24 DIAGNOSIS — F33.8 SEASONAL AFFECTIVE DISORDER (HCC): ICD-10-CM

## 2023-04-24 DIAGNOSIS — Z12.4 CERVICAL CANCER SCREENING: ICD-10-CM

## 2023-04-24 DIAGNOSIS — Z00.00 MEDICARE ANNUAL WELLNESS VISIT, SUBSEQUENT: Primary | ICD-10-CM

## 2023-04-24 DIAGNOSIS — E78.5 HYPERLIPIDEMIA, UNSPECIFIED HYPERLIPIDEMIA TYPE: ICD-10-CM

## 2023-04-24 DIAGNOSIS — E53.8 VITAMIN B12 DEFICIENCY: ICD-10-CM

## 2023-04-24 DIAGNOSIS — I10 ESSENTIAL HYPERTENSION: ICD-10-CM

## 2023-04-24 DIAGNOSIS — E55.9 VITAMIN D DEFICIENCY: ICD-10-CM

## 2023-04-24 DIAGNOSIS — F41.9 ANXIETY: ICD-10-CM

## 2023-04-24 DIAGNOSIS — M81.0 OSTEOPOROSIS OF LUMBAR SPINE: ICD-10-CM

## 2023-04-24 DIAGNOSIS — L82.1 SEBORRHEIC KERATOSES: ICD-10-CM

## 2023-04-24 DIAGNOSIS — M81.0 OSTEOPOROSIS, UNSPECIFIED OSTEOPOROSIS TYPE, UNSPECIFIED PATHOLOGICAL FRACTURE PRESENCE: ICD-10-CM

## 2023-04-24 RX ORDER — CHLORAL HYDRATE 500 MG
1000 CAPSULE ORAL DAILY
COMMUNITY

## 2023-04-24 RX ORDER — ESCITALOPRAM OXALATE 10 MG/1
5 TABLET ORAL DAILY
Start: 2023-04-24

## 2023-04-24 NOTE — PROGRESS NOTES
Assessment and Plan:     Problem List Items Addressed This Visit        Cardiovascular and Mediastinum    Essential hypertension     Well-controlled  Continue losartan 100 mg daily  Relevant Orders    CBC and differential    Comprehensive metabolic panel    TSH, 3rd generation with Free T4 reflex       Musculoskeletal and Integument    Osteoporosis     Declined DEXA  She reports the main reason is a bad reaction that she had to Fosamax  She does not want to try other osteoporosis meds and prefers to simply continue calcium and vitamin D  Osteoporosis of lumbar spine    Relevant Orders    Comprehensive metabolic panel    Vitamin D 25 hydroxy       Other    Hyperlipidemia     Currently on atorvastatin 20 mg daily  Recheck with labs as ordered  Relevant Orders    Lipid Panel with Direct LDL reflex    Vitamin B12 deficiency     Recheck with labs as ordered  Relevant Orders    Vitamin B12    Vitamin D deficiency     Recheck with labs as ordered  Relevant Orders    Vitamin D 25 hydroxy    Anxiety     Controlled  Patient will continue with Lexapro 5 mg daily for now  Will continue to monitor  Other Visit Diagnoses     Medicare annual wellness visit, subsequent    -  Primary    Cervical cancer screening        Seasonal affective disorder ([facility] [address] )        Relevant Medications    escitalopram (LEXAPRO) 10 mg tablet    Seborrheic keratoses        In the groin  Patient was referred to a female dermatologist for removal      Relevant Orders    Ambulatory Referral to Dermatology         BMI Counseling: Body mass index is 25 83 kg/m²  The BMI is above normal  Nutrition recommendations include reducing intake of saturated and trans fat  Exercise recommendations include exercising 3-5 times per week  Rationale for BMI follow-up plan is due to patient being overweight or obese  Depression Screening and Follow-up Plan: Patient was screened for depression during today's encounter  They screened negative with a PHQ-2 score of 2  Preventive health issues were discussed with patient, and age appropriate screening tests were ordered as noted in patient's After Visit Summary  Personalized health advice and appropriate referrals for health education or preventive services given if needed, as noted in patient's After Visit Summary  History of Present Illness:     Patient presents for a Medicare Wellness Visit    Hypertension-increase losartan to 100 mg last year - at home about 120/70    Hyperlipidemia-on atorvastatin 20 mg daily-LDL in October 2021 was 80    Anxiety-was contemplating weaning off of Lexapro last year - currently on 5 mg daily and working well     Osteoporosis on calcium 600 mg twice daily and vitamin D 1000 units daily-vitamin D level in October 2021 was 39     Patient Care Team:  Suzan Massey PA-C as PCP - General (Family Medicine)  Suzan Massey PA-C as PCP - PCP-[facility]-Javier Franco MD as PCP - Endocrinology (Endocrinology)  Nikhil Coon MD     Review of Systems:     Review of Systems   Constitutional: Negative for chills and fever  HENT: Negative for rhinorrhea and sore throat  Eyes: Negative for visual disturbance  Respiratory: Negative for cough, shortness of breath and wheezing  Cardiovascular: Negative for chest pain, palpitations and leg swelling  Gastrointestinal: Negative for abdominal pain, blood in stool, constipation, diarrhea, nausea and vomiting  Endocrine: Negative for polydipsia and polyuria  Genitourinary: Negative for dysuria and frequency  Musculoskeletal: Negative for arthralgias and myalgias  Skin: Negative for rash  Neurological: Negative for dizziness, syncope and headaches  Hematological: Does not bruise/bleed easily  Psychiatric/Behavioral: Negative for dysphoric mood  The patient is not nervous/anxious           Problem List:     Patient Active Problem List   Diagnosis   • "Fatigue   • Hyperlipidemia   • Microscopic hematuria   • Nontoxic single thyroid nodule   • Osteoporosis   • Positive PPD   • Sciatica   • Simple goiter   • Vitamin B12 deficiency   • Vitamin D deficiency   • Foot callus   • Plantar wart of both feet   • Dizziness   • Paresthesia of left arm   • Anxiety   • Heel pain, chronic, left   • Osteoporosis of lumbar spine   • Costochondritis   • Essential hypertension   • Acute bilateral low back pain without sciatica   • Motion sickness      Past Medical and Surgical History:     Past Medical History:   Diagnosis Date   • Anemia     last assessed: 5/27/2016   • Anxiety     last assessed: 11/16/2015   • Esophageal reflux     resolved: 2/28/2017   • External hemorrhoids     last assessed: 11/20/2012   • Migraine     resolved after went through menopause     Past Surgical History:   Procedure Laterality Date   • CATARACT EXTRACTION, BILATERAL Bilateral     around March or July 2021   • ESOPHAGOGASTRODUODENOSCOPY  2015    diagnostic; neg   • FINE NEEDLE ASPIRATION  2013    thyroid nodule-negative for malignancy      Family History:     Family History   Problem Relation Age of Onset   • Diabetes Mother         mellitus   • Diabetes Father         mellitus   • Cancer Sister         stomach cancer   • Arthritis Sister    • Kidney disease Sister         kidneys are \"shrinking\" due to lots of protein   • Thyroid disease Sister         disorder   • Hypertension Sister    • Breast cancer Sister    • No Known Problems Sister    • No Known Problems Sister    • Osteoporosis Sister    • No Known Problems Sister    • Arthritis Sister    • Glaucoma Sister    • No Known Problems Daughter    • No Known Problems Daughter    • No Known Problems Maternal Grandmother    • No Known Problems Maternal Grandfather    • No Known Problems Paternal Grandmother    • No Known Problems Paternal Grandfather    • No Known Problems Maternal Aunt    • No Known Problems Paternal Aunt    • Breast cancer Family  " Social History:     Social History     Socioeconomic History   • Marital status: /Civil Union     Spouse name: None   • Number of children: None   • Years of education: None   • Highest education level: None   Occupational History   • Occupation: teacher   Tobacco Use   • Smoking status: Never   • Smokeless tobacco: Never   Vaping Use   • Vaping Use: Never used   Substance and Sexual Activity   • Alcohol use: Never   • Drug use: Never   • Sexual activity: None   Other Topics Concern   • None   Social History Narrative    Denied: history of caffeine use     Social Determinants of Health     Financial Resource Strain: Low Risk    • Difficulty of Paying Living Expenses: Not hard at all   Food Insecurity: Not on file   Transportation Needs: No Transportation Needs   • Lack of Transportation (Medical): No   • Lack of Transportation (Non-Medical): No   Physical Activity: Not on file   Stress: Not on file   Social Connections: Not on file   Intimate Partner Violence: Not on file   Housing Stability: Not on file      Medications and Allergies:     Current Outpatient Medications   Medication Sig Dispense Refill   • atorvastatin (LIPITOR) 20 mg tablet Take 1 tablet by mouth once daily 90 tablet 0   • Calcium Carbonate-Vitamin D (CALCARB 600/D PO) Take 1 tablet by mouth 2 (two) times a day     • escitalopram (LEXAPRO) 10 mg tablet Take 0 5 tablets (5 mg total) by mouth daily     • losartan (COZAAR) 100 MG tablet Take 1 tablet by mouth once daily 90 tablet 0   • multivitamin (THERAGRAN) TABS Take 1 tablet by mouth daily     • Omega-3 Fatty Acids (fish oil) 1,000 mg Take 1,000 mg by mouth daily     • promethazine (PHENERGAN) 25 mg tablet Take 1 tablet (25 mg total) by mouth every 8 (eight) hours as needed for nausea or vomiting 30 tablet 0     No current facility-administered medications for this visit       Allergies   Allergen Reactions   • Fosamax [Alendronate] Vomiting and Abdominal Pain      Immunizations: Immunization History   Administered Date(s) Administered   • COVID-19 MODERNA VACC 0 5 ML IM 02/20/2021, 03/06/2021, 11/08/2021   • H1N1, All Formulations 10/29/2009   • Hep A, adult 05/12/2009   • INFLUENZA 12/05/2006   • Influenza Split High Dose Preservative Free IM 09/01/2016, 10/05/2017   • Influenza, high dose seasonal 0 7 mL 11/27/2018, 10/07/2019, 10/07/2020, 10/05/2021, 12/09/2022   • Influenza, seasonal, injectable 12/19/2005, 12/10/2007, 11/08/2008, 10/11/2012, 09/26/2014, 09/16/2015   • Pneumococcal Conjugate 13-Valent 02/28/2017   • Pneumococcal Polysaccharide PPV23 06/22/2018   • Tdap 05/12/2009   • Tuberculin Skin Test-PPD Intradermal 04/01/1998      Health Maintenance:         Topic Date Due   • Cervical Cancer Screening  Never done   • Colorectal Cancer Screening  10/24/2023 (Originally 3/21/1995)   • DXA SCAN  10/24/2023 (Originally 2/7/2022)   • Breast Cancer Screening: Mammogram  02/02/2024   • Hepatitis C Screening  Completed         Topic Date Due   • COVID-19 Vaccine (4 - Booster) 01/03/2022      Medicare Screening Tests and Risk Assessments:     Antonio is here for her Subsequent Wellness visit  Health Risk Assessment:   Patient rates overall health as good  Patient feels that their physical health rating is same  Patient is satisfied with their life  Eyesight was rated as same  Hearing was rated as same  Patient feels that their emotional and mental health rating is much better  Patients states they are sometimes angry  Patient states they are sometimes unusually tired/fatigued  Pain experienced in the last 7 days has been none  Patient states that she has experienced no weight loss or gain in last 6 months  Fall Risk Screening: In the past year, patient has experienced: no history of falling in past year      Urinary Incontinence Screening:   Patient has not leaked urine accidently in the last six months       Home Safety:  Patient has trouble with stairs inside or outside of their home  Patient has working smoke alarms and has working carbon monoxide detector  Home safety hazards include: none  Occasionally has difficulty going up the steps    Nutrition:   Current diet is Regular  Medications:   Patient is currently taking over-the-counter supplements  OTC medications include: see medication list  Patient is able to manage medications  Activities of Daily Living (ADLs)/Instrumental Activities of Daily Living (IADLs):   Walk and transfer into and out of bed and chair?: Yes  Dress and groom yourself?: Yes    Bathe or shower yourself?: Yes    Feed yourself? Yes  Do your laundry/housekeeping?: Yes  Manage your money, pay your bills and track your expenses?: Yes  Make your own meals?: Yes    Do your own shopping?: Yes    Previous Hospitalizations:   Any hospitalizations or ED visits within the last 12 months?: No      Advance Care Planning:   Living will: Yes    Durable POA for healthcare:  Yes    Advanced directive: Yes      Cognitive Screening:   Provider or family/friend/caregiver concerned regarding cognition?: No    PREVENTIVE SCREENINGS      Cardiovascular Screening:    General: Screening Not Indicated and History Lipid Disorder    Due for: Lipid Panel      Diabetes Screening:       Due for: Blood Glucose      Colorectal Cancer Screening:     General: Patient Declines      Breast Cancer Screening:     General: Screening Current      Cervical Cancer Screening:    General: Screening Not Indicated      Osteoporosis Screening:    General: Screening Not Indicated, History Osteoporosis and Patient Declines      Abdominal Aortic Aneurysm (AAA) Screening:        General: Screening Not Indicated      Lung Cancer Screening:     General: Screening Not Indicated      Hepatitis C Screening:    General: Screening Current    Screening, Brief Intervention, and Referral to Treatment (SBIRT)    Screening  Typical number of drinks in a day: 0  Typical number of drinks in a week: 0  Interpretation: "Low risk drinking behavior  Single Item Drug Screening:  How often have you used an illegal drug (including marijuana) or a prescription medication for non-medical reasons in the past year? never    Single Item Drug Screen Score: 0  Interpretation: Negative screen for possible drug use disorder    No results found  Physical Exam:     /70 (BP Location: Left arm, Patient Position: Sitting, Cuff Size: Adult)   Temp (!) 96 7 °F (35 9 °C) (Temporal)   Ht 5' 2\" (1 575 m)   Wt 64 kg (141 lb 3 2 oz)   BMI 25 83 kg/m²     Physical Exam  Vitals reviewed  Constitutional:       General: She is not in acute distress  Appearance: Normal appearance  She is well-developed  She is not ill-appearing  HENT:      Head: Normocephalic and atraumatic  Right Ear: Tympanic membrane, ear canal and external ear normal  There is no impacted cerumen  Left Ear: Tympanic membrane, ear canal and external ear normal  There is no impacted cerumen  Nose: Nose normal       Mouth/Throat:      Mouth: Mucous membranes are moist       Pharynx: No oropharyngeal exudate or posterior oropharyngeal erythema  Eyes:      Conjunctiva/sclera: Conjunctivae normal       Pupils: Pupils are equal, round, and reactive to light  Neck:      Thyroid: No thyromegaly  Vascular: No carotid bruit  Cardiovascular:      Rate and Rhythm: Normal rate and regular rhythm  Pulses: Normal pulses  Heart sounds: Normal heart sounds  No murmur heard  Pulmonary:      Effort: Pulmonary effort is normal       Breath sounds: Normal breath sounds  No wheezing, rhonchi or rales  Abdominal:      General: Bowel sounds are normal  There is no distension  Palpations: Abdomen is soft  There is no mass  Tenderness: There is no abdominal tenderness  Musculoskeletal:      Cervical back: Normal range of motion and neck supple  Right lower leg: No edema  Left lower leg: No edema     Lymphadenopathy:      Cervical: No " cervical adenopathy  Skin:     General: Skin is warm and dry  Findings: No rash  Neurological:      Mental Status: She is alert  Sensory: No sensory deficit  Motor: No weakness  Comments: 5/5 strength in UE and LE   Psychiatric:         Mood and Affect: Mood normal          Behavior: Behavior normal          Thought Content:  Thought content normal          Judgment: Judgment normal           Christen JA Fay

## 2023-04-24 NOTE — PATIENT INSTRUCTIONS
Medicare Preventive Visit Patient Instructions  Thank you for completing your Welcome to Medicare Visit or Medicare Annual Wellness Visit today  Your next wellness visit will be due in one year (4/24/2024)  The screening/preventive services that you may require over the next 5-10 years are detailed below  Some tests may not apply to you based off risk factors and/or age  Screening tests ordered at today's visit but not completed yet may show as past due  Also, please note that scanned in results may not display below  Preventive Screenings:  Service Recommendations Previous Testing/Comments   Colorectal Cancer Screening  * Colonoscopy    * Fecal Occult Blood Test (FOBT)/Fecal Immunochemical Test (FIT)  * Fecal DNA/Cologuard Test  * Flexible Sigmoidoscopy Age: 39-70 years old   Colonoscopy: every 10 years (may be performed more frequently if at higher risk)  OR  FOBT/FIT: every 1 year  OR  Cologuard: every 3 years  OR  Sigmoidoscopy: every 5 years  Screening may be recommended earlier than age 39 if at higher risk for colorectal cancer  Also, an individualized decision between you and your healthcare provider will decide whether screening between the ages of 74-80 would be appropriate  Colonoscopy: Not on file  FOBT/FIT: Not on file  Cologuard: Not on file  Sigmoidoscopy: Not on file          Breast Cancer Screening Age: 36 years old  Frequency: every 1-2 years  Not required if history of left and right mastectomy Mammogram: 02/02/2023        Cervical Cancer Screening Between the ages of 21-29, pap smear recommended once every 3 years  Between the ages of 33-67, can perform pap smear with HPV co-testing every 5 years     Recommendations may differ for women with a history of total hysterectomy, cervical cancer, or abnormal pap smears in past  Pap Smear: Not on file        Hepatitis C Screening Once for adults born between [facility]  More frequently in patients at high risk for Hepatitis C Hep C Antibody: 07/09/2018        Diabetes Screening 1-2 times per year if you're at risk for diabetes or have pre-diabetes Fasting glucose: No results in last 5 years (No results in last 5 years)  A1C: No results in last 5 years (No results in last 5 years)      Cholesterol Screening Once every 5 years if you don't have a lipid disorder  May order more often based on risk factors  Lipid panel: 10/14/2021          Other Preventive Screenings Covered by Medicare:  1  Abdominal Aortic Aneurysm (AAA) Screening: covered once if your at risk  You're considered to be at risk if you have a family history of AAA  2  Lung Cancer Screening: covers low dose CT scan once per year if you meet all of the following conditions: (1) Age 50-69; (2) No signs or symptoms of lung cancer; (3) Current smoker or have quit smoking within the last 15 years; (4) You have a tobacco smoking history of at least 20 pack years (packs per day multiplied by number of years you smoked); (5) You get a written order from a healthcare provider  3  Glaucoma Screening: covered annually if you're considered high risk: (1) You have diabetes OR (2) Family history of glaucoma OR (3)  aged 48 and older OR (3)  American aged 72 and older  3  Osteoporosis Screening: covered every 2 years if you meet one of the following conditions: (1) You're estrogen deficient and at risk for osteoporosis based off medical history and other findings; (2) Have a vertebral abnormality; (3) On glucocorticoid therapy for more than 3 months; (4) Have primary hyperparathyroidism; (5) On osteoporosis medications and need to assess response to drug therapy  · Last bone density test (DXA Scan): 02/11/2020   5  HIV Screening: covered annually if you're between the age of 15-65  Also covered annually if you are younger than 13 and older than 72 with risk factors for HIV infection  For pregnant patients, it is covered up to 3 times per pregnancy      Immunizations:  Immunization Recommendations   Influenza Vaccine Annual influenza vaccination during flu season is recommended for all persons aged >= 6 months who do not have contraindications   Pneumococcal Vaccine   * Pneumococcal conjugate vaccine = PCV13 (Prevnar 13), PCV15 (Vaxneuvance), PCV20 (Prevnar 20)  * Pneumococcal polysaccharide vaccine = PPSV23 (Pneumovax) Adults 25-60 years old: 1-3 doses may be recommended based on certain risk factors  Adults 72 years old: 1-2 doses may be recommended based off what pneumonia vaccine you previously received   Hepatitis B Vaccine 3 dose series if at intermediate or high risk (ex: diabetes, end stage renal disease, liver disease)   Tetanus (Td) Vaccine - COST NOT COVERED BY MEDICARE PART B Following completion of primary series, a booster dose should be given every 10 years to maintain immunity against tetanus  Td may also be given as tetanus wound prophylaxis  Tdap Vaccine - COST NOT COVERED BY MEDICARE PART B Recommended at least once for all adults  For pregnant patients, recommended with each pregnancy  Shingles Vaccine (Shingrix) - COST NOT COVERED BY MEDICARE PART B  2 shot series recommended in those aged 48 and above     Health Maintenance Due:      Topic Date Due   • Cervical Cancer Screening  Never done   • Colorectal Cancer Screening  Never done   • DXA SCAN  02/07/2022   • Breast Cancer Screening: Mammogram  02/02/2024   • Hepatitis C Screening  Completed     Immunizations Due:      Topic Date Due   • COVID-19 Vaccine (4 - Booster) 01/03/2022     Advance Directives   What are advance directives? Advance directives are legal documents that state your wishes and plans for medical care  These plans are made ahead of time in case you lose your ability to make decisions for yourself  Advance directives can apply to any medical decision, such as the treatments you want, and if you want to donate organs  What are the types of advance directives?   There are many types of advance directives, and each state has rules about how to use them  You may choose a combination of any of the following:  · Living will: This is a written record of the treatment you want  You can also choose which treatments you do not want, which to limit, and which to stop at a certain time  This includes surgery, medicine, IV fluid, and tube feedings  · Durable power of  for healthcare [facility]): This is a written record that states who you want to make healthcare choices for you when you are unable to make them for yourself  This person, called a proxy, is usually a family member or a friend  You may choose more than 1 proxy  · Do not resuscitate (DNR) order:  A DNR order is used in case your heart stops beating or you stop breathing  It is a request not to have certain forms of treatment, such as CPR  A DNR order may be included in other types of advance directives  · Medical directive: This covers the care that you want if you are in a coma, near death, or unable to make decisions for yourself  You can list the treatments you want for each condition  Treatment may include pain medicine, surgery, blood transfusions, dialysis, IV or tube feedings, and a ventilator (breathing machine)  · Values history: This document has questions about your views, beliefs, and how you feel and think about life  This information can help others choose the care that you would choose  Why are advance directives important? An advance directive helps you control your care  Although spoken wishes may be used, it is better to have your wishes written down  Spoken wishes can be misunderstood, or not followed  Treatments may be given even if you do not want them  An advance directive may make it easier for your family to make difficult choices about your care     Weight Management   Why it is important to manage your weight:  Being overweight increases your risk of health conditions such as heart disease, high blood pressure, type 2 diabetes, and certain types of cancer  It can also increase your risk for osteoarthritis, sleep apnea, and other respiratory problems  Aim for a slow, steady weight loss  Even a small amount of weight loss can lower your risk of health problems  How to lose weight safely:  A safe and healthy way to lose weight is to eat fewer calories and get regular exercise  You can lose up about 1 pound a week by decreasing the number of calories you eat by 500 calories each day  Healthy meal plan for weight management:  A healthy meal plan includes a variety of foods, contains fewer calories, and helps you stay healthy  A healthy meal plan includes the following:  · Eat whole-grain foods more often  A healthy meal plan should contain fiber  Fiber is the part of grains, fruits, and vegetables that is not broken down by your body  Whole-grain foods are healthy and provide extra fiber in your diet  Some examples of whole-grain foods are whole-wheat breads and pastas, oatmeal, brown rice, and bulgur  · Eat a variety of vegetables every day  Include dark, leafy greens such as spinach, kale, ren greens, and mustard greens  Eat yellow and orange vegetables such as carrots, sweet potatoes, and winter squash  · Eat a variety of fruits every day  Choose fresh or canned fruit (canned in its own juice or light syrup) instead of juice  Fruit juice has very little or no fiber  · Eat low-fat dairy foods  Drink fat-free (skim) milk or 1% milk  Eat fat-free yogurt and low-fat cottage cheese  Try low-fat cheeses such as mozzarella and other reduced-fat cheeses  · Choose meat and other protein foods that are low in fat  Choose beans or other legumes such as split peas or lentils  Choose fish, skinless poultry (chicken or turkey), or lean cuts of red meat (beef or pork)  Before you cook meat or poultry, cut off any visible fat  · Use less fat and oil  Try baking foods instead of frying them   Add less fat, such as margarine, sour cream, regular salad dressing and mayonnaise to foods  Eat fewer high-fat foods  Some examples of high-fat foods include french fries, doughnuts, ice cream, and cakes  · Eat fewer sweets  Limit foods and drinks that are high in sugar  This includes candy, cookies, regular soda, and sweetened drinks  Exercise:  Exercise at least 30 minutes per day on most days of the week  Some examples of exercise include walking, biking, dancing, and swimming  You can also fit in more physical activity by taking the stairs instead of the elevator or parking farther away from stores  Ask your healthcare provider about the best exercise plan for you  © Copyright [employer] 2018 Information is for End User's use only and may not be sold, redistributed or otherwise used for commercial purposes   All illustrations and images included in CareNotes® are the copyrighted property of A D A M , Inc  or [address] [place] [address]

## 2023-04-24 NOTE — ASSESSMENT & PLAN NOTE
Declined DEXA  She reports the main reason is a bad reaction that she had to Fosamax   She does not want to try other osteoporosis meds and prefers to simply continue calcium and vitamin D

## 2023-04-24 NOTE — PROGRESS NOTES
Assessment and Plan:     Problem List Items Addressed This Visit        Cardiovascular and Mediastinum    Essential hypertension    Relevant Orders    CBC and differential    Comprehensive metabolic panel    TSH, 3rd generation with Free T4 reflex       Musculoskeletal and Integument    Osteoporosis of lumbar spine    Relevant Orders    Comprehensive metabolic panel    Vitamin D 25 hydroxy       Other    Hyperlipidemia    Relevant Orders    Lipid Panel with Direct LDL reflex    Vitamin B12 deficiency    Relevant Orders    Vitamin B12    Vitamin D deficiency    Relevant Orders    Vitamin D 25 hydroxy    Anxiety   Other Visit Diagnoses     Medicare annual wellness visit, subsequent    -  Primary    Cervical cancer screening        Seasonal affective disorder ([address] )        Relevant Medications    escitalopram (LEXAPRO) 10 mg tablet    Seborrheic keratoses        Relevant Orders    Ambulatory Referral to Dermatology           Preventive health issues were discussed with patient, and age appropriate screening tests were ordered as noted in patient's After Visit Summary  Personalized health advice and appropriate referrals for health education or preventive services given if needed, as noted in patient's After Visit Summary       History of Present Illness:     Patient presents for a Medicare Wellness Visit    HPI   Patient Care Team:  Siddhartha Gerardo PA-C as PCP - General (Family Medicine)  Siddhartha Gerardo PA-C as PCP - PCP-[facility]  Micheline Connor MD as PCP - Endocrinology (Endocrinology)  Mildred Carnes MD     Review of Systems:     Review of Systems     Problem List:     Patient Active Problem List   Diagnosis   • Fatigue   • Hyperlipidemia   • Microscopic hematuria   • Nontoxic single thyroid nodule   • Osteoporosis   • Positive PPD   • Sciatica   • Simple goiter   • Vitamin B12 deficiency   • Vitamin D deficiency   • Foot callus   • Plantar wart of both feet   • Dizziness   • "Paresthesia of left arm   • Anxiety   • Heel pain, chronic, left   • Osteoporosis of lumbar spine   • Costochondritis   • Essential hypertension   • Acute bilateral low back pain without sciatica   • Motion sickness      Past Medical and Surgical History:     Past Medical History:   Diagnosis Date   • Anemia     last assessed: 5/27/2016   • Anxiety     last assessed: 11/16/2015   • Esophageal reflux     resolved: 2/28/2017   • External hemorrhoids     last assessed: 11/20/2012   • Migraine     resolved after went through menopause     Past Surgical History:   Procedure Laterality Date   • CATARACT EXTRACTION, BILATERAL Bilateral     around March or July 2021   • ESOPHAGOGASTRODUODENOSCOPY  2015    diagnostic; neg   • FINE NEEDLE ASPIRATION  2013    thyroid nodule-negative for malignancy      Family History:     Family History   Problem Relation Age of Onset   • Diabetes Mother         mellitus   • Diabetes Father         mellitus   • Cancer Sister         stomach cancer   • Arthritis Sister    • Kidney disease Sister         kidneys are \"shrinking\" due to lots of protein   • Thyroid disease Sister         disorder   • Hypertension Sister    • Breast cancer Sister    • No Known Problems Sister    • No Known Problems Sister    • Osteoporosis Sister    • No Known Problems Sister    • Arthritis Sister    • Glaucoma Sister    • No Known Problems Daughter    • No Known Problems Daughter    • No Known Problems Maternal Grandmother    • No Known Problems Maternal Grandfather    • No Known Problems Paternal Grandmother    • No Known Problems Paternal Grandfather    • No Known Problems Maternal Aunt    • No Known Problems Paternal Aunt    • Breast cancer Family       Social History:     Social History     Socioeconomic History   • Marital status: /Civil Union     Spouse name: None   • Number of children: None   • Years of education: None   • Highest education level: None   Occupational History   • Occupation: teacher " Tobacco Use   • Smoking status: Never   • Smokeless tobacco: Never   Vaping Use   • Vaping Use: Never used   Substance and Sexual Activity   • Alcohol use: Never   • Drug use: Never   • Sexual activity: None   Other Topics Concern   • None   Social History Narrative    Denied: history of caffeine use     Social Determinants of Health     Financial Resource Strain: Low Risk    • Difficulty of Paying Living Expenses: Not hard at all   Food Insecurity: Not on file   Transportation Needs: No Transportation Needs   • Lack of Transportation (Medical): No   • Lack of Transportation (Non-Medical): No   Physical Activity: Not on file   Stress: Not on file   Social Connections: Not on file   Intimate Partner Violence: Not on file   Housing Stability: Not on file      Medications and Allergies:     Current Outpatient Medications   Medication Sig Dispense Refill   • atorvastatin (LIPITOR) 20 mg tablet Take 1 tablet by mouth once daily 90 tablet 0   • Calcium Carbonate-Vitamin D (CALCARB 600/D PO) Take 1 tablet by mouth 2 (two) times a day     • escitalopram (LEXAPRO) 10 mg tablet Take 0 5 tablets (5 mg total) by mouth daily     • losartan (COZAAR) 100 MG tablet Take 1 tablet by mouth once daily 90 tablet 0   • multivitamin (THERAGRAN) TABS Take 1 tablet by mouth daily     • Omega-3 Fatty Acids (fish oil) 1,000 mg Take 1,000 mg by mouth daily     • promethazine (PHENERGAN) 25 mg tablet Take 1 tablet (25 mg total) by mouth every 8 (eight) hours as needed for nausea or vomiting 30 tablet 0     No current facility-administered medications for this visit       Allergies   Allergen Reactions   • Fosamax [Alendronate] Vomiting and Abdominal Pain      Immunizations:     Immunization History   Administered Date(s) Administered   • COVID-19 MODERNA VACC 0 5 ML IM 02/20/2021, 03/06/2021, 11/08/2021   • H1N1, All Formulations 10/29/2009   • Hep A, adult 05/12/2009   • INFLUENZA 12/05/2006   • Influenza Split High Dose Preservative Free IM "09/01/2016, 10/05/2017   • Influenza, high dose seasonal 0 7 mL 11/27/2018, 10/07/2019, 10/07/2020, 10/05/2021, 12/09/2022   • Influenza, seasonal, injectable 12/19/2005, 12/10/2007, 11/08/2008, 10/11/2012, 09/26/2014, 09/16/2015   • Pneumococcal Conjugate 13-Valent 02/28/2017   • Pneumococcal Polysaccharide PPV23 06/22/2018   • Tdap 05/12/2009   • Tuberculin Skin Test-PPD Intradermal 04/01/1998      Health Maintenance:         Topic Date Due   • Cervical Cancer Screening  Never done   • Colorectal Cancer Screening  10/24/2023 (Originally 3/21/1995)   • DXA SCAN  10/24/2023 (Originally 2/7/2022)   • Breast Cancer Screening: Mammogram  02/02/2024   • Hepatitis C Screening  Completed         Topic Date Due   • COVID-19 Vaccine (4 - Booster) 01/03/2022      Medicare Screening Tests and Risk Assessments:     Annual Wellness Visit  No results found       Physical Exam:     /70 (BP Location: Left arm, Patient Position: Sitting, Cuff Size: Adult)   Temp (!) 96 7 °F (35 9 °C) (Temporal)   Ht 5' 2\" (1 575 m)   Wt 64 kg (141 lb 3 2 oz)   BMI 25 83 kg/m²     Physical Exam     Lawana Olszewski, PA-C  "

## 2023-05-01 LAB
25(OH)D3 SERPL-MCNC: 48 NG/ML (ref 30–100)
ALBUMIN SERPL-MCNC: 4 G/DL (ref 3.6–5.1)
ALBUMIN/GLOB SERPL: 1.5 (CALC) (ref 1–2.5)
ALP SERPL-CCNC: 71 U/L (ref 37–153)
ALT SERPL-CCNC: 16 U/L (ref 6–29)
AST SERPL-CCNC: 13 U/L (ref 10–35)
BASOPHILS # BLD AUTO: 51 CELLS/UL (ref 0–200)
BASOPHILS NFR BLD AUTO: 1 %
BILIRUB SERPL-MCNC: 0.4 MG/DL (ref 0.2–1.2)
BUN SERPL-MCNC: 15 MG/DL (ref 7–25)
BUN/CREAT SERPL: NORMAL (CALC) (ref 6–22)
CALCIUM SERPL-MCNC: 9.2 MG/DL (ref 8.6–10.4)
CHLORIDE SERPL-SCNC: 105 MMOL/L (ref 98–110)
CHOLEST SERPL-MCNC: 165 MG/DL
CHOLEST/HDLC SERPL: 2.6 (CALC)
CO2 SERPL-SCNC: 29 MMOL/L (ref 20–32)
CREAT SERPL-MCNC: 0.68 MG/DL (ref 0.6–1)
EOSINOPHIL # BLD AUTO: 128 CELLS/UL (ref 15–500)
EOSINOPHIL NFR BLD AUTO: 2.5 %
ERYTHROCYTE [DISTWIDTH] IN BLOOD BY AUTOMATED COUNT: 12.1 % (ref 11–15)
GFR/BSA.PRED SERPLBLD CYS-BASED-ARV: 92 ML/MIN/1.73M2
GLOBULIN SER CALC-MCNC: 2.6 G/DL (CALC) (ref 1.9–3.7)
GLUCOSE SERPL-MCNC: 94 MG/DL (ref 65–99)
HCT VFR BLD AUTO: 40 % (ref 35–45)
HDLC SERPL-MCNC: 63 MG/DL
HGB BLD-MCNC: 13.3 G/DL (ref 11.7–15.5)
LDLC SERPL CALC-MCNC: 81 MG/DL (CALC)
LYMPHOCYTES # BLD AUTO: 2351 CELLS/UL (ref 850–3900)
LYMPHOCYTES NFR BLD AUTO: 46.1 %
MCH RBC QN AUTO: 30 PG (ref 27–33)
MCHC RBC AUTO-ENTMCNC: 33.3 G/DL (ref 32–36)
MCV RBC AUTO: 90.1 FL (ref 80–100)
MONOCYTES # BLD AUTO: 439 CELLS/UL (ref 200–950)
MONOCYTES NFR BLD AUTO: 8.6 %
NEUTROPHILS # BLD AUTO: 2132 CELLS/UL (ref 1500–7800)
NEUTROPHILS NFR BLD AUTO: 41.8 %
NONHDLC SERPL-MCNC: 102 MG/DL (CALC)
PLATELET # BLD AUTO: 265 THOUSAND/UL (ref 140–400)
PMV BLD REES-ECKER: 10.6 FL (ref 7.5–12.5)
POTASSIUM SERPL-SCNC: 4.3 MMOL/L (ref 3.5–5.3)
PROT SERPL-MCNC: 6.6 G/DL (ref 6.1–8.1)
RBC # BLD AUTO: 4.44 MILLION/UL (ref 3.8–5.1)
SODIUM SERPL-SCNC: 139 MMOL/L (ref 135–146)
TRIGL SERPL-MCNC: 111 MG/DL
TSH SERPL-ACNC: 1.31 MIU/L (ref 0.4–4.5)
VIT B12 SERPL-MCNC: 394 PG/ML (ref 200–1100)
WBC # BLD AUTO: 5.1 THOUSAND/UL (ref 3.8–10.8)

## 2023-06-02 DIAGNOSIS — F33.8 SEASONAL AFFECTIVE DISORDER (HCC): ICD-10-CM

## 2023-06-02 RX ORDER — ESCITALOPRAM OXALATE 10 MG/1
5 TABLET ORAL DAILY
Qty: 15 TABLET | Refills: 1 | Status: SHIPPED | OUTPATIENT
Start: 2023-06-02

## 2023-06-13 DIAGNOSIS — E78.5 HYPERLIPIDEMIA, UNSPECIFIED HYPERLIPIDEMIA TYPE: ICD-10-CM

## 2023-06-13 RX ORDER — ATORVASTATIN CALCIUM 20 MG/1
20 TABLET, FILM COATED ORAL DAILY
Qty: 90 TABLET | Refills: 0 | Status: SHIPPED | OUTPATIENT
Start: 2023-06-13

## 2023-07-05 DIAGNOSIS — I10 ESSENTIAL HYPERTENSION: ICD-10-CM

## 2023-07-05 RX ORDER — LOSARTAN POTASSIUM 100 MG/1
TABLET ORAL
Qty: 90 TABLET | Refills: 0 | Status: SHIPPED | OUTPATIENT
Start: 2023-07-05

## 2023-07-06 ENCOUNTER — VBI (OUTPATIENT)
Dept: ADMINISTRATIVE | Facility: OTHER | Age: 73
End: 2023-07-06

## 2023-07-28 DIAGNOSIS — E78.5 HYPERLIPIDEMIA, UNSPECIFIED HYPERLIPIDEMIA TYPE: ICD-10-CM

## 2023-07-28 DIAGNOSIS — F33.8 SEASONAL AFFECTIVE DISORDER (HCC): ICD-10-CM

## 2023-07-28 DIAGNOSIS — I10 ESSENTIAL HYPERTENSION: ICD-10-CM

## 2023-07-28 RX ORDER — LOSARTAN POTASSIUM 100 MG/1
100 TABLET ORAL DAILY
Qty: 90 TABLET | Refills: 0 | Status: SHIPPED | OUTPATIENT
Start: 2023-07-28

## 2023-07-28 RX ORDER — ESCITALOPRAM OXALATE 10 MG/1
5 TABLET ORAL DAILY
Qty: 45 TABLET | Refills: 0 | Status: SHIPPED | OUTPATIENT
Start: 2023-07-28

## 2023-07-28 RX ORDER — ATORVASTATIN CALCIUM 20 MG/1
20 TABLET, FILM COATED ORAL DAILY
Qty: 90 TABLET | Refills: 0 | Status: SHIPPED | OUTPATIENT
Start: 2023-07-28

## 2023-07-28 RX ORDER — ESCITALOPRAM OXALATE 10 MG/1
5 TABLET ORAL DAILY
Qty: 15 TABLET | Refills: 1 | Status: SHIPPED | OUTPATIENT
Start: 2023-07-28 | End: 2023-07-28 | Stop reason: SDUPTHER

## 2023-07-28 NOTE — TELEPHONE ENCOUNTER
Leaving for [place] for 45 days on 7/31/223  She'll be there for 45 days, so she'll need enough medication to get her through. Please fill if appropriate.

## 2023-09-08 DIAGNOSIS — E78.5 HYPERLIPIDEMIA, UNSPECIFIED HYPERLIPIDEMIA TYPE: ICD-10-CM

## 2023-09-08 RX ORDER — ATORVASTATIN CALCIUM 20 MG/1
20 TABLET, FILM COATED ORAL DAILY
Qty: 90 TABLET | Refills: 0 | Status: SHIPPED | OUTPATIENT
Start: 2023-09-08

## 2023-09-08 NOTE — TELEPHONE ENCOUNTER
Good afternoon. This is [facility] March 21st, 1950. She need a third western for [place]. She doesn't have enough, so would you please call the pharmacy [facility] for atorvastatin 20 milligram, once a day, 90 days. Telephone number is [phone]. Thank you very much and have a good weekend.  Russel.

## 2023-09-27 DIAGNOSIS — F33.8 SEASONAL AFFECTIVE DISORDER (HCC): ICD-10-CM

## 2023-09-27 RX ORDER — ESCITALOPRAM OXALATE 10 MG/1
5 TABLET ORAL DAILY
Qty: 45 TABLET | Refills: 0 | Status: SHIPPED | OUTPATIENT
Start: 2023-09-27

## 2023-10-09 ENCOUNTER — CLINICAL SUPPORT (OUTPATIENT)
Dept: FAMILY MEDICINE CLINIC | Facility: CLINIC | Age: 73
End: 2023-10-09
Payer: COMMERCIAL

## 2023-10-09 DIAGNOSIS — Z23 NEED FOR INFLUENZA VACCINATION: Primary | ICD-10-CM

## 2023-10-09 PROCEDURE — 90662 IIV NO PRSV INCREASED AG IM: CPT

## 2023-10-09 PROCEDURE — G0008 ADMIN INFLUENZA VIRUS VAC: HCPCS

## 2023-10-10 DIAGNOSIS — I10 ESSENTIAL HYPERTENSION: ICD-10-CM

## 2023-10-10 RX ORDER — LOSARTAN POTASSIUM 100 MG/1
100 TABLET ORAL DAILY
Qty: 90 TABLET | Refills: 0 | Status: SHIPPED | OUTPATIENT
Start: 2023-10-10

## 2023-11-29 DIAGNOSIS — E78.5 HYPERLIPIDEMIA, UNSPECIFIED HYPERLIPIDEMIA TYPE: ICD-10-CM

## 2023-11-29 RX ORDER — ATORVASTATIN CALCIUM 20 MG/1
20 TABLET, FILM COATED ORAL DAILY
Qty: 90 TABLET | Refills: 0 | Status: SHIPPED | OUTPATIENT
Start: 2023-11-29

## 2023-12-18 ENCOUNTER — VBI (OUTPATIENT)
Dept: ADMINISTRATIVE | Facility: OTHER | Age: 73
End: 2023-12-18

## 2023-12-18 DIAGNOSIS — F33.8 SEASONAL AFFECTIVE DISORDER (HCC): ICD-10-CM

## 2023-12-18 NOTE — TELEPHONE ENCOUNTER
12/18/23 11:09 AM     [employer] SmartForm used to document caregap status.    Christel Barrett    Follow-up in the outpatient setting and return as needed for worsening symptoms.

## 2023-12-19 RX ORDER — ESCITALOPRAM OXALATE 10 MG/1
5 TABLET ORAL DAILY
Qty: 45 TABLET | Refills: 1 | Status: SHIPPED | OUTPATIENT
Start: 2023-12-19

## 2024-01-03 DIAGNOSIS — I10 ESSENTIAL HYPERTENSION: ICD-10-CM

## 2024-01-04 ENCOUNTER — VBI (OUTPATIENT)
Dept: ADMINISTRATIVE | Facility: OTHER | Age: 74
End: 2024-01-04

## 2024-01-04 RX ORDER — LOSARTAN POTASSIUM 100 MG/1
100 TABLET ORAL DAILY
Qty: 90 TABLET | Refills: 0 | Status: SHIPPED | OUTPATIENT
Start: 2024-01-04

## 2024-01-23 ENCOUNTER — OFFICE VISIT (OUTPATIENT)
Dept: FAMILY MEDICINE CLINIC | Facility: CLINIC | Age: 74
End: 2024-01-23
Payer: COMMERCIAL

## 2024-01-23 VITALS
BODY MASS INDEX: 25.95 KG/M2 | HEIGHT: 62 IN | SYSTOLIC BLOOD PRESSURE: 132 MMHG | TEMPERATURE: 98 F | WEIGHT: 141 LBS | HEART RATE: 75 BPM | OXYGEN SATURATION: 97 % | DIASTOLIC BLOOD PRESSURE: 72 MMHG

## 2024-01-23 DIAGNOSIS — F33.8 SEASONAL AFFECTIVE DISORDER (HCC): ICD-10-CM

## 2024-01-23 DIAGNOSIS — R10.11 RUQ ABDOMINAL PAIN: Primary | ICD-10-CM

## 2024-01-23 PROCEDURE — 99214 OFFICE O/P EST MOD 30 MIN: CPT | Performed by: INTERNAL MEDICINE

## 2024-01-23 NOTE — ASSESSMENT & PLAN NOTE
May be also exacerbated due to recent death in her family.  Increase her Lexapro up to 10 mg daily again.

## 2024-01-23 NOTE — PROGRESS NOTES
"Assessment/Plan:    Seasonal affective disorder (HCC)  May be also exacerbated due to recent death in her family.  Increase her Lexapro up to 10 mg daily again.    RUQ abdominal pain  Reports chronic pain in her right upper quadrant that comes and goes, she has this pain already for 1 year.  Blood work not too long ago did not reveal any changes in her liver enzymes.  Will do ultrasound of the right upper quadrant to exclude gallbladder stones.  If this test will not be revealing and her pain will continue then we will have to proceed with CT scan of the abdomen with contrast.       Diagnoses and all orders for this visit:    RUQ abdominal pain  -     US right upper quadrant; Future    Seasonal affective disorder (HCC)          Subjective:      Patient ID: Antonio Nieves is a 73 y.o. female.    Patient came today with new complaints of right upper quadrant abdominal pain that developed about a year ago but looks like became a little bit more frequent, she also reports worsening of her mood recently due to loss of a family member.    Abdominal Pain  Pertinent negatives include no constipation, diarrhea, fever, nausea or vomiting.       The following portions of the patient's history were reviewed and updated as appropriate: allergies, current medications, past family history, past medical history, past social history, past surgical history, and problem list.    Review of Systems   Constitutional:  Negative for chills and fever.   Cardiovascular:  Negative for chest pain, palpitations and leg swelling.   Gastrointestinal:  Positive for abdominal pain. Negative for abdominal distention, constipation, diarrhea, nausea and vomiting.   Psychiatric/Behavioral:  Positive for dysphoric mood. Negative for behavioral problems, self-injury and suicidal ideas.          Objective:      /72 (BP Location: Left arm, Patient Position: Sitting, Cuff Size: Standard)   Pulse 75   Temp 98 °F (36.7 °C) (Tympanic)   Ht 5' 2\" " (1.575 m)   Wt 64 kg (141 lb)   SpO2 97%   BMI 25.79 kg/m²     Allergies   Allergen Reactions    Fosamax [Alendronate] Vomiting and Abdominal Pain          Current Outpatient Medications:     atorvastatin (LIPITOR) 20 mg tablet, Take 1 tablet by mouth once daily, Disp: 90 tablet, Rfl: 0    Calcium Carbonate-Vitamin D (CALCARB 600/D PO), Take 1 tablet by mouth 2 (two) times a day, Disp: , Rfl:     escitalopram (LEXAPRO) 10 mg tablet, Take 0.5 tablets (5 mg total) by mouth daily, Disp: 45 tablet, Rfl: 1    losartan (COZAAR) 100 MG tablet, Take 1 tablet by mouth once daily, Disp: 90 tablet, Rfl: 0    multivitamin (THERAGRAN) TABS, Take 1 tablet by mouth daily, Disp: , Rfl:     Omega-3 Fatty Acids (fish oil) 1,000 mg, Take 1,000 mg by mouth daily, Disp: , Rfl:     promethazine (PHENERGAN) 25 mg tablet, Take 1 tablet (25 mg total) by mouth every 8 (eight) hours as needed for nausea or vomiting, Disp: 30 tablet, Rfl: 0     There are no Patient Instructions on file for this visit.        Physical Exam  Constitutional:       General: She is not in acute distress.     Appearance: She is not ill-appearing or toxic-appearing.   Cardiovascular:      Pulses: Normal pulses.      Heart sounds: No murmur heard.     No gallop.   Pulmonary:      Effort: Pulmonary effort is normal. No respiratory distress.      Breath sounds: No wheezing or rales.   Abdominal:      General: Bowel sounds are normal. There is no distension.      Palpations: There is no mass.      Tenderness: There is no abdominal tenderness. There is no guarding or rebound.      Hernia: No hernia is present.   Psychiatric:         Behavior: Behavior normal.         Thought Content: Thought content normal.         Judgment: Judgment normal.

## 2024-01-23 NOTE — ASSESSMENT & PLAN NOTE
Reports chronic pain in her right upper quadrant that comes and goes, she has this pain already for 1 year.  Blood work not too long ago did not reveal any changes in her liver enzymes.  Will do ultrasound of the right upper quadrant to exclude gallbladder stones.  If this test will not be revealing and her pain will continue then we will have to proceed with CT scan of the abdomen with contrast.

## 2024-01-25 ENCOUNTER — HOSPITAL ENCOUNTER (OUTPATIENT)
Dept: ULTRASOUND IMAGING | Facility: HOSPITAL | Age: 74
Discharge: HOME | End: 2024-01-25
Attending: INTERNAL MEDICINE
Payer: COMMERCIAL

## 2024-01-25 DIAGNOSIS — R10.11 RUQ ABDOMINAL PAIN: ICD-10-CM

## 2024-01-25 PROCEDURE — 76705 ECHO EXAM OF ABDOMEN: CPT

## 2024-02-05 ENCOUNTER — TELEPHONE (OUTPATIENT)
Dept: FAMILY MEDICINE CLINIC | Facility: CLINIC | Age: 74
End: 2024-02-05

## 2024-02-05 NOTE — TELEPHONE ENCOUNTER
Called Pt no answer LM. Please relay following message:  US of the right upper quadrant did not show any abnormality.  Good news. Please ask patient whether her abdominal pain got better or not.

## 2024-02-05 NOTE — TELEPHONE ENCOUNTER
----- Message from Frank Pacheco MD sent at 2/5/2024  1:10 PM EST -----  Ultrasound of the right upper quadrant did not show any abnormality.  Good news.  Please ask patient whether her abdominal pain got better or not.

## 2024-02-06 ENCOUNTER — TELEPHONE (OUTPATIENT)
Age: 74
End: 2024-02-06

## 2024-02-06 NOTE — TELEPHONE ENCOUNTER
Patient called in for ultrasound results. Informed patient that results are normal, no abnormalities found.  Patient states that her abdominal pain has resolved.

## 2024-02-19 DIAGNOSIS — E78.5 HYPERLIPIDEMIA, UNSPECIFIED HYPERLIPIDEMIA TYPE: ICD-10-CM

## 2024-02-19 RX ORDER — ATORVASTATIN CALCIUM 20 MG/1
20 TABLET, FILM COATED ORAL DAILY
Qty: 90 TABLET | Refills: 1 | Status: SHIPPED | OUTPATIENT
Start: 2024-02-19

## 2024-02-19 NOTE — TELEPHONE ENCOUNTER
Patient  called,  pharmacy said nothing called in. Please call patient  when sent to pharmacy [phone].[place]

## 2024-02-19 NOTE — TELEPHONE ENCOUNTER
Reason for call:   [x] Refill   [] Prior Auth  [] Other:     Office: [facility]   [x] PCP/Provider - moshe   [] Specialty/Provider -     Medication: atorvastatin     Dose/Frequency: 20 mg/ daily     Quantity: 90 day supply     Pharmacy: [facility]     Does the patient have enough for 3 days?   [] Yes   [x] No - Send as HP to POD

## 2024-04-04 DIAGNOSIS — I10 ESSENTIAL HYPERTENSION: ICD-10-CM

## 2024-04-05 RX ORDER — LOSARTAN POTASSIUM 100 MG/1
100 TABLET ORAL DAILY
Qty: 90 TABLET | Refills: 0 | Status: SHIPPED | OUTPATIENT
Start: 2024-04-05

## 2024-04-22 ENCOUNTER — RA CDI HCC (OUTPATIENT)
Dept: OTHER | Facility: HOSPITAL | Age: 74
End: 2024-04-22

## 2024-04-24 ENCOUNTER — TELEPHONE (OUTPATIENT)
Dept: ADMINISTRATIVE | Facility: OTHER | Age: 74
End: 2024-04-24

## 2024-04-24 NOTE — TELEPHONE ENCOUNTER
04/24/24 11:58 AM    Patient contacted (left message) to bring Advance Directive, POLST, or Living Will document to next scheduled pcp visit.    Thank you.  Devika Clemente  [facility]

## 2024-05-06 ENCOUNTER — OFFICE VISIT (OUTPATIENT)
Dept: FAMILY MEDICINE CLINIC | Facility: CLINIC | Age: 74
End: 2024-05-06
Payer: COMMERCIAL

## 2024-05-06 VITALS
HEIGHT: 62 IN | SYSTOLIC BLOOD PRESSURE: 130 MMHG | WEIGHT: 142.2 LBS | TEMPERATURE: 96.7 F | RESPIRATION RATE: 20 BRPM | DIASTOLIC BLOOD PRESSURE: 68 MMHG | HEART RATE: 68 BPM | OXYGEN SATURATION: 99 % | BODY MASS INDEX: 26.17 KG/M2

## 2024-05-06 DIAGNOSIS — F33.8 SEASONAL AFFECTIVE DISORDER (HCC): ICD-10-CM

## 2024-05-06 DIAGNOSIS — Z23 ENCOUNTER FOR IMMUNIZATION: ICD-10-CM

## 2024-05-06 DIAGNOSIS — Z13.1 SCREENING FOR DIABETES MELLITUS: ICD-10-CM

## 2024-05-06 DIAGNOSIS — Z12.31 ENCOUNTER FOR SCREENING MAMMOGRAM FOR MALIGNANT NEOPLASM OF BREAST: ICD-10-CM

## 2024-05-06 DIAGNOSIS — Z12.11 COLON CANCER SCREENING: ICD-10-CM

## 2024-05-06 DIAGNOSIS — I10 ESSENTIAL HYPERTENSION: ICD-10-CM

## 2024-05-06 DIAGNOSIS — E78.5 HYPERLIPIDEMIA, UNSPECIFIED HYPERLIPIDEMIA TYPE: ICD-10-CM

## 2024-05-06 DIAGNOSIS — Z00.00 MEDICARE ANNUAL WELLNESS VISIT, SUBSEQUENT: Primary | ICD-10-CM

## 2024-05-06 DIAGNOSIS — Z23 NEED FOR PROPHYLACTIC VACCINATION WITH COMBINED DIPHTHERIA-TETANUS-PERTUSSIS (DTP) VACCINE: ICD-10-CM

## 2024-05-06 PROCEDURE — 99214 OFFICE O/P EST MOD 30 MIN: CPT | Performed by: INTERNAL MEDICINE

## 2024-05-06 PROCEDURE — G0439 PPPS, SUBSEQ VISIT: HCPCS | Performed by: INTERNAL MEDICINE

## 2024-05-06 NOTE — PROGRESS NOTES
Assessment and Plan:     Problem List Items Addressed This Visit       Hyperlipidemia     Recheck lipid panel, continue current dose of Lipitor.         Relevant Orders    Lipid panel    Essential hypertension     Blood pressure is very well-controlled, will recheck BMP.         Relevant Orders    Basic metabolic panel    Seasonal affective disorder (HCC)     She is doing okay on current dose of Lexapro.          Other Visit Diagnoses       Medicare annual wellness visit, subsequent    -  Primary    Encounter for screening mammogram for malignant neoplasm of breast        Relevant Orders    Mammo screening bilateral w 3d & cad    Colon cancer screening        Relevant Orders    Cologuard    Screening for diabetes mellitus        Relevant Orders    Hemoglobin A1C    Need for prophylactic vaccination with combined diphtheria-tetanus-pertussis (DTP) vaccine        Relevant Medications    tetanus-diphtheria-acellular pertussis (ADACEL) 5-2-15.5 LF-mcg/0.5 injection    Encounter for immunization                 Preventive health issues were discussed with patient, and age appropriate screening tests were ordered as noted in patient's After Visit Summary.  Personalized health advice and appropriate referrals for health education or preventive services given if needed, as noted in patient's After Visit Summary.     History of Present Illness:     Patient presents for a Medicare Wellness Visit    Patient came today for Medicare wellness visit and to follow-up on her chronic medical problems.  She agreed for mammogram and Cologuard.  She agreed for Shingrix and tetanus shot.  She will consider it next visit.  Vital signs are normal.  She is physically active, eats healthy.         Patient Care Team:  Delaney Baca PA-C as PCP - General (Family Medicine)  Delaney Baca PA-C as PCP - PCP-[facility]  Carlotta Thompson MD as PCP - Endocrinology (Endocrinology)  Hubert Glover MD     Review of  Systems:     Review of Systems   Constitutional:  Negative for activity change, appetite change, chills, fatigue and fever.   HENT:  Negative for congestion, ear pain, rhinorrhea and sore throat.    Respiratory:  Negative for cough, shortness of breath and wheezing.    Cardiovascular:  Negative for chest pain, palpitations and leg swelling.   Gastrointestinal:  Negative for abdominal distention, abdominal pain, diarrhea, nausea and vomiting.   Genitourinary:  Negative for difficulty urinating, frequency and pelvic pain.   Musculoskeletal:  Negative for arthralgias, back pain and neck pain.   Skin:  Negative for rash.   Neurological:  Negative for dizziness, tremors, weakness, numbness and headaches.        Problem List:     Patient Active Problem List   Diagnosis    Fatigue    Hyperlipidemia    Microscopic hematuria    Nontoxic single thyroid nodule    Osteoporosis    Positive PPD    Sciatica    Simple goiter    Vitamin B12 deficiency    Vitamin D deficiency    Foot callus    Plantar wart of both feet    Dizziness    Paresthesia of left arm    Anxiety    Heel pain, chronic, left    Osteoporosis of lumbar spine    Costochondritis    Essential hypertension    Acute bilateral low back pain without sciatica    Motion sickness    Seasonal affective disorder (HCC)    RUQ abdominal pain      Past Medical and Surgical History:     Past Medical History:   Diagnosis Date    Anemia     last assessed: 5/27/2016    Anxiety     last assessed: 11/16/2015    Esophageal reflux     resolved: 2/28/2017    External hemorrhoids     last assessed: 11/20/2012    Migraine     resolved after went through menopause     Past Surgical History:   Procedure Laterality Date    CATARACT EXTRACTION, BILATERAL Bilateral     around March or July 2021    ESOPHAGOGASTRODUODENOSCOPY  2015    diagnostic; neg    FINE NEEDLE ASPIRATION  2013    thyroid nodule-negative for malignancy      Family History:     Family History   Problem Relation Age of Onset     "Diabetes Mother         mellitus    Diabetes Father         mellitus    Cancer Sister         stomach cancer    Arthritis Sister     Kidney disease Sister         kidneys are \"shrinking\" due to lots of protein    Thyroid disease Sister         disorder    Hypertension Sister     Breast cancer Sister     No Known Problems Sister     No Known Problems Sister     Osteoporosis Sister     No Known Problems Sister     Arthritis Sister     Glaucoma Sister     No Known Problems Daughter     No Known Problems Daughter     No Known Problems Maternal Grandmother     No Known Problems Maternal Grandfather     No Known Problems Paternal Grandmother     No Known Problems Paternal Grandfather     No Known Problems Maternal Aunt     No Known Problems Paternal Aunt     Breast cancer Family       Social History:     Social History     Socioeconomic History    Marital status: /Civil Union     Spouse name: None    Number of children: None    Years of education: None    Highest education level: None   Occupational History    Occupation: teacher   Tobacco Use    Smoking status: Never    Smokeless tobacco: Never   Vaping Use    Vaping status: Never Used   Substance and Sexual Activity    Alcohol use: Never    Drug use: Never    Sexual activity: None   Other Topics Concern    None   Social History Narrative    Denied: history of caffeine use     Social Determinants of Health     Financial Resource Strain: Low Risk  (4/24/2023)    Overall Financial Resource Strain (CARDIA)     Difficulty of Paying Living Expenses: Not hard at all   Food Insecurity: No Food Insecurity (5/6/2024)    Hunger Vital Sign     Worried About Running Out of Food in the Last Year: Never true     Ran Out of Food in the Last Year: Never true   Transportation Needs: No Transportation Needs (5/6/2024)    PRAPARE - Transportation     Lack of Transportation (Medical): No     Lack of Transportation (Non-Medical): No   Physical Activity: Not on file   Stress: Not on " file   Social Connections: Not on file   Intimate Partner Violence: Not on file   Housing Stability: Low Risk  (5/6/2024)    Housing Stability Vital Sign     Unable to Pay for Housing in the Last Year: No     Number of Places Lived in the Last Year: 1     Unstable Housing in the Last Year: No      Medications and Allergies:     Current Outpatient Medications   Medication Sig Dispense Refill    tetanus-diphtheria-acellular pertussis (ADACEL) 5-2-15.5 LF-mcg/0.5 injection Inject 0.5 mL into a muscle once for 1 dose 0.5 mL 0    atorvastatin (LIPITOR) 20 mg tablet Take 1 tablet (20 mg total) by mouth daily 90 tablet 1    Calcium Carbonate-Vitamin D (CALCARB 600/D PO) Take 1 tablet by mouth 2 (two) times a day      escitalopram (LEXAPRO) 10 mg tablet Take 0.5 tablets (5 mg total) by mouth daily 45 tablet 1    losartan (COZAAR) 100 MG tablet Take 1 tablet by mouth once daily 90 tablet 0    multivitamin (THERAGRAN) TABS Take 1 tablet by mouth daily      Omega-3 Fatty Acids (fish oil) 1,000 mg Take 1,000 mg by mouth daily      promethazine (PHENERGAN) 25 mg tablet Take 1 tablet (25 mg total) by mouth every 8 (eight) hours as needed for nausea or vomiting 30 tablet 0     No current facility-administered medications for this visit.     Allergies   Allergen Reactions    Fosamax [Alendronate] Vomiting and Abdominal Pain      Immunizations:     Immunization History   Administered Date(s) Administered    COVID-19 MODERNA VACC 0.5 ML IM 02/20/2021, 03/06/2021, 11/08/2021, 07/13/2022    H1N1, All Formulations 10/29/2009    Hep A, adult 05/12/2009    INFLUENZA 12/05/2006    Influenza Split High Dose Preservative Free IM 09/01/2016, 10/05/2017    Influenza, high dose seasonal 0.7 mL 11/27/2018, 10/07/2019, 10/07/2020, 10/05/2021, 12/09/2022, 10/09/2023    Influenza, seasonal, injectable 12/19/2005, 12/10/2007, 11/08/2008, 10/11/2012, 09/26/2014, 09/16/2015    Pneumococcal Conjugate 13-Valent 02/28/2017    Pneumococcal Polysaccharide  PPV23 06/22/2018    Tdap 05/12/2009    Tuberculin Skin Test-PPD Intradermal 04/01/1998      Health Maintenance:         Topic Date Due    Cervical Cancer Screening  Never done    Colorectal Cancer Screening  Never done    DXA SCAN  02/07/2022    Breast Cancer Screening: Mammogram  02/02/2024    Hepatitis C Screening  Completed         Topic Date Due    COVID-19 Vaccine (6 - 2023-24 season) 09/01/2023      Medicare Screening Tests and Risk Assessments:         Health Risk Assessment:   Patient rates overall health as very good. Patient feels that their physical health rating is same. Patient is satisfied with their life. Eyesight was rated as same. Hearing was rated as same. Patient feels that their emotional and mental health rating is slightly better. Patients states they are never, rarely angry. Patient states they are sometimes unusually tired/fatigued. Pain experienced in the last 7 days has been none. Patient states that she has experienced no weight loss or gain in last 6 months.     Depression Screening:   PHQ-9 Score: 0      Fall Risk Screening:   In the past year, patient has experienced: no history of falling in past year      Urinary Incontinence Screening:   Patient has leaked urine accidently in the last six months.     Home Safety:  Patient does not have trouble with stairs inside or outside of their home. Patient has working smoke alarms and has working carbon monoxide detector. Home safety hazards include: none.     Nutrition:   Current diet is Low Cholesterol.     Medications:   Patient is currently taking over-the-counter supplements. OTC medications include: see medication list. Patient is able to manage medications.     Activities of Daily Living (ADLs)/Instrumental Activities of Daily Living (IADLs):   Walk and transfer into and out of bed and chair?: Yes  Dress and groom yourself?: Yes    Bathe or shower yourself?: Yes    Feed yourself? Yes  Do your laundry/housekeeping?: Yes  Manage your  "money, pay your bills and track your expenses?: Yes  Make your own meals?: Yes    Do your own shopping?: Yes    Advance Care Planning:   Living will: No    Durable POA for healthcare: No    Advanced directive: No      PREVENTIVE SCREENINGS      Cardiovascular Screening:    General: Screening Not Indicated and History Lipid Disorder      Breast Cancer Screening:     General: Screening Current      Cervical Cancer Screening:    General: Screening Not Indicated      Osteoporosis Screening:    General: Screening Not Indicated and History Osteoporosis      Lung Cancer Screening:     General: Screening Not Indicated      Hepatitis C Screening:    General: Screening Current    Screening, Brief Intervention, and Referral to Treatment (SBIRT)    Screening  Typical number of drinks in a day: 0  Typical number of drinks in a week: 0  Interpretation: Low risk drinking behavior.    No results found.     Physical Exam:     /68 (BP Location: Left arm, Patient Position: Sitting, Cuff Size: Standard)   Pulse 68   Temp (!) 96.7 °F (35.9 °C) (Tympanic)   Resp 20   Ht 5' 2\" (1.575 m)   Wt 64.5 kg (142 lb 3.2 oz)   SpO2 99%   BMI 26.01 kg/m²     Physical Exam  Constitutional:       General: She is not in acute distress.     Appearance: Normal appearance. She is not toxic-appearing.   Cardiovascular:      Rate and Rhythm: Normal rate and regular rhythm.      Heart sounds: No murmur heard.     No friction rub. No gallop.   Pulmonary:      Effort: No respiratory distress.      Breath sounds: No wheezing or rales.   Abdominal:      General: There is no distension.      Palpations: There is no mass.      Tenderness: There is no abdominal tenderness. There is no rebound.      Hernia: No hernia is present.   Musculoskeletal:         General: No swelling or deformity.   Neurological:      General: No focal deficit present.      Mental Status: She is alert and oriented to person, place, and time.   Psychiatric:         Mood and " Affect: Mood normal.         Behavior: Behavior normal.          Frank Pacheco MD

## 2024-06-30 DIAGNOSIS — I10 ESSENTIAL HYPERTENSION: ICD-10-CM

## 2024-07-01 RX ORDER — LOSARTAN POTASSIUM 100 MG/1
100 TABLET ORAL DAILY
Qty: 90 TABLET | Refills: 0 | Status: SHIPPED | OUTPATIENT
Start: 2024-07-01

## 2024-07-18 DIAGNOSIS — F33.8 SEASONAL AFFECTIVE DISORDER (HCC): ICD-10-CM

## 2024-07-18 RX ORDER — ESCITALOPRAM OXALATE 10 MG/1
5 TABLET ORAL DAILY
Qty: 45 TABLET | Refills: 1 | Status: SHIPPED | OUTPATIENT
Start: 2024-07-18

## 2024-07-18 NOTE — TELEPHONE ENCOUNTER
Reason for call:   [x] Refill   [] Prior Auth  [] Other:     Office:   [x] PCP/Provider -   [] Specialty/Provider -     Medication: escitalopram (LEXAPRO) 10 mg  0.5 tablet daily       Pharmacy: [facility] [place]     Does the patient have enough for 3 days?   [x] Yes   [] No - Send as HP to POD

## 2024-08-09 ENCOUNTER — APPOINTMENT (OUTPATIENT)
Dept: LAB | Facility: MEDICAL CENTER | Age: 74
End: 2024-08-09
Payer: COMMERCIAL

## 2024-08-09 DIAGNOSIS — I10 ESSENTIAL HYPERTENSION: ICD-10-CM

## 2024-08-09 DIAGNOSIS — Z13.1 SCREENING FOR DIABETES MELLITUS: ICD-10-CM

## 2024-08-09 DIAGNOSIS — E78.5 HYPERLIPIDEMIA, UNSPECIFIED HYPERLIPIDEMIA TYPE: ICD-10-CM

## 2024-08-09 LAB
ANION GAP SERPL CALCULATED.3IONS-SCNC: 6 MMOL/L (ref 4–13)
BUN SERPL-MCNC: 17 MG/DL (ref 5–25)
CALCIUM SERPL-MCNC: 9.3 MG/DL (ref 8.4–10.2)
CHLORIDE SERPL-SCNC: 103 MMOL/L (ref 96–108)
CHOLEST SERPL-MCNC: 162 MG/DL
CO2 SERPL-SCNC: 28 MMOL/L (ref 21–32)
CREAT SERPL-MCNC: 0.66 MG/DL (ref 0.6–1.3)
EST. AVERAGE GLUCOSE BLD GHB EST-MCNC: 120 MG/DL
GFR SERPL CREATININE-BSD FRML MDRD: 87 ML/MIN/1.73SQ M
GLUCOSE P FAST SERPL-MCNC: 89 MG/DL (ref 65–99)
HBA1C MFR BLD: 5.8 %
HDLC SERPL-MCNC: 56 MG/DL
LDLC SERPL CALC-MCNC: 86 MG/DL (ref 0–100)
NONHDLC SERPL-MCNC: 106 MG/DL
POTASSIUM SERPL-SCNC: 4.4 MMOL/L (ref 3.5–5.3)
SODIUM SERPL-SCNC: 137 MMOL/L (ref 135–147)
TRIGL SERPL-MCNC: 98 MG/DL

## 2024-08-09 PROCEDURE — 83036 HEMOGLOBIN GLYCOSYLATED A1C: CPT

## 2024-08-09 PROCEDURE — 36415 COLL VENOUS BLD VENIPUNCTURE: CPT

## 2024-08-09 PROCEDURE — 80048 BASIC METABOLIC PNL TOTAL CA: CPT

## 2024-08-09 PROCEDURE — 80061 LIPID PANEL: CPT

## 2024-08-12 ENCOUNTER — OFFICE VISIT (OUTPATIENT)
Dept: FAMILY MEDICINE CLINIC | Facility: CLINIC | Age: 74
End: 2024-08-12
Payer: COMMERCIAL

## 2024-08-12 VITALS
OXYGEN SATURATION: 98 % | WEIGHT: 138.6 LBS | DIASTOLIC BLOOD PRESSURE: 68 MMHG | HEIGHT: 62 IN | BODY MASS INDEX: 25.51 KG/M2 | HEART RATE: 67 BPM | SYSTOLIC BLOOD PRESSURE: 130 MMHG | RESPIRATION RATE: 16 BRPM | TEMPERATURE: 97 F

## 2024-08-12 DIAGNOSIS — F41.9 ANXIETY: Primary | ICD-10-CM

## 2024-08-12 DIAGNOSIS — R73.03 PRE-DIABETES: ICD-10-CM

## 2024-08-12 DIAGNOSIS — Z12.4 SCREENING FOR CERVICAL CANCER: ICD-10-CM

## 2024-08-12 DIAGNOSIS — Z12.31 ENCOUNTER FOR SCREENING MAMMOGRAM FOR BREAST CANCER: ICD-10-CM

## 2024-08-12 PROCEDURE — 99214 OFFICE O/P EST MOD 30 MIN: CPT | Performed by: FAMILY MEDICINE

## 2024-08-12 PROCEDURE — G2211 COMPLEX E/M VISIT ADD ON: HCPCS | Performed by: FAMILY MEDICINE

## 2024-08-12 NOTE — PATIENT INSTRUCTIONS
Increase lexapro back up to 1 full tablet daily (which is 10 mg daily)    If still not feeling right in 2 weeks - notifshree contreras or dr villa    Find something to do once daily that you enjoy.    Cut back on white floured products and dessert items  Add more whole grains, brown rice, oatmeal, veggies   And enjoy exercising!

## 2024-08-12 NOTE — PROGRESS NOTES
FAMILY PRACTICE OFFICE VISIT    NAME: Antonio Nieves    AGE: 74 y.o.   SEX: female  : 1950   MRN: [id number]    DATE: 2024  TIME: 9:30 AM    Assessment and Plan   1. Screening for cervical cancer  Pt declines routine pelvic exam      2. Encounter for screening mammogram for breast cancer  Declines mammo      3. Anxiety  Increased recently due to some personal stressors - see HPI  See below re: lexapro.      4. Pre-diabetes  See below    PHQ-9 score of 2   And LEIGH 7 score of 2      Patient Instructions   Increase lexapro back up to 1 full tablet daily (which is 10 mg daily)    If still not feeling right in 2 weeks - notify ben or dr villa    Find something to do once daily that you enjoy.    Cut back on white floured products and dessert items  Add more whole grains, brown rice, oatmeal, veggies   And enjoy exercising!        Chief Complaint     Chief Complaint   Patient presents with    Stress       History of Present Illness   Antonio Nieves is a 74 y.o.-year-old female who presents today for acute concern  Just had a recent visit from her grandchildren - who were here X 2 mos  And they left a couple of days ago   They are 9 and 5 yo    She has other grandchildren in the area    Other family in [place] - and pt is worried about them      Review of Systems   Review of Systems   Constitutional:  Negative for activity change and appetite change.   Respiratory:  Negative for cough, shortness of breath and wheezing.    Cardiovascular:  Negative for chest pain, palpitations and leg swelling.   Gastrointestinal:  Negative for abdominal pain, nausea and vomiting.        No GERD     Psychiatric/Behavioral:  Negative for dysphoric mood, self-injury, sleep disturbance and suicidal ideas. The patient is nervous/anxious.         Pt was started on lexapro 1-2 years ago  And initially took 10 mg   But then pt started feeling a lot better so she went down to 5 mg daily    Enjoys gardening  Lives with  "  Limited socialization outside of the home       Active Problem List     Patient Active Problem List   Diagnosis    Fatigue    Hyperlipidemia    Microscopic hematuria    Nontoxic single thyroid nodule    Osteoporosis    Positive PPD    Sciatica    Simple goiter    Vitamin B12 deficiency    Vitamin D deficiency    Foot callus    Plantar wart of both feet    Dizziness    Paresthesia of left arm    Anxiety    Heel pain, chronic, left    Osteoporosis of lumbar spine    Costochondritis    Essential hypertension    Acute bilateral low back pain without sciatica    Motion sickness    Seasonal affective disorder (HCC)    RUQ abdominal pain         Past Medical History:  Past Medical History:   Diagnosis Date    Anemia     last assessed: 5/27/2016    Anxiety     last assessed: 11/16/2015    Esophageal reflux     resolved: 2/28/2017    External hemorrhoids     last assessed: 11/20/2012    Migraine     resolved after went through menopause       Past Surgical History:  Past Surgical History:   Procedure Laterality Date    CATARACT EXTRACTION, BILATERAL Bilateral     around March or July 2021    ESOPHAGOGASTRODUODENOSCOPY  2015    diagnostic; neg    FINE NEEDLE ASPIRATION  2013    thyroid nodule-negative for malignancy       Family History:  Family History   Problem Relation Age of Onset    Diabetes Mother         mellitus    Diabetes Father         mellitus    Cancer Sister         stomach cancer    Arthritis Sister     Kidney disease Sister         kidneys are \"shrinking\" due to lots of protein    Thyroid disease Sister         disorder    Hypertension Sister     Breast cancer Sister     No Known Problems Sister     No Known Problems Sister     Osteoporosis Sister     No Known Problems Sister     Arthritis Sister     Glaucoma Sister     No Known Problems Daughter     No Known Problems Daughter     No Known Problems Maternal Grandmother     No Known Problems Maternal Grandfather     No Known Problems Paternal " Grandmother     No Known Problems Paternal Grandfather     No Known Problems Maternal Aunt     No Known Problems Paternal Aunt     Breast cancer Family        Social History:  Social History     Socioeconomic History    Marital status: /Civil Union     Spouse name: Not on file    Number of children: Not on file    Years of education: Not on file    Highest education level: Not on file   Occupational History    Occupation: teacher   Tobacco Use    Smoking status: Never    Smokeless tobacco: Never   Vaping Use    Vaping status: Never Used   Substance and Sexual Activity    Alcohol use: Never    Drug use: Never    Sexual activity: Not on file   Other Topics Concern    Not on file   Social History Narrative    Denied: history of caffeine use     Social Determinants of Health     Financial Resource Strain: Low Risk  (4/24/2023)    Overall Financial Resource Strain (CARDIA)     Difficulty of Paying Living Expenses: Not hard at all   Food Insecurity: No Food Insecurity (5/6/2024)    Hunger Vital Sign     Worried About Running Out of Food in the Last Year: Never true     Ran Out of Food in the Last Year: Never true   Transportation Needs: No Transportation Needs (5/6/2024)    PRAPARE - Transportation     Lack of Transportation (Medical): No     Lack of Transportation (Non-Medical): No   Physical Activity: Not on file   Stress: Not on file   Social Connections: Unknown (6/18/2024)    Received from [employer]    Social Pulmonx     How often do you feel lonely or isolated from those around you? (Adult - for ages 18 years and over): Not on file   Intimate Partner Violence: Not on file   Housing Stability: Low Risk  (5/6/2024)    Housing Stability Vital Sign     Unable to Pay for Housing in the Last Year: No     Number of Times Moved in the Last Year: 1     Homeless in the Last Year: No       Objective     Vitals:    08/12/24 0923   BP: 130/68   Pulse: 67   Resp: 16   Temp: (!) 97 °F (36.1 °C)   SpO2: 98%     Wt  "Readings from Last 3 Encounters:   08/12/24 62.9 kg (138 lb 9.6 oz)   05/06/24 64.5 kg (142 lb 3.2 oz)   01/23/24 64 kg (141 lb)       Physical Exam  Vitals and nursing note reviewed.   Constitutional:       General: She is not in acute distress.     Appearance: Normal appearance. She is not ill-appearing or toxic-appearing.   Cardiovascular:      Rate and Rhythm: Normal rate and regular rhythm.      Pulses: Normal pulses.      Heart sounds: Normal heart sounds. No murmur heard.  Pulmonary:      Effort: No respiratory distress.      Breath sounds: Normal breath sounds. No wheezing, rhonchi or rales.   Abdominal:      General: There is no distension.      Palpations: Abdomen is soft. There is no mass.      Tenderness: There is no abdominal tenderness. There is no guarding or rebound.   Musculoskeletal:      Right lower leg: No edema.      Left lower leg: No edema.   Skin:     General: Skin is warm.      Coloration: Skin is not jaundiced.   Neurological:      General: No focal deficit present.      Mental Status: She is alert and oriented to person, place, and time.   Psychiatric:         Mood and Affect: Mood normal.         Behavior: Behavior normal.         Thought Content: Thought content normal.         Judgment: Judgment normal.         Pertinent Laboratory/Diagnostic Studies:  Lab Results   Component Value Date    GLUCOSE 113 11/19/2015    BUN 17 08/09/2024    CREATININE 0.66 08/09/2024    CALCIUM 9.3 08/09/2024     03/06/2017    K 4.4 08/09/2024    CO2 28 08/09/2024     08/09/2024     Lab Results   Component Value Date    ALT 16 05/01/2023    AST 13 05/01/2023    ALKPHOS 71 05/01/2023    BILITOT 0.3 03/06/2017       Lab Results   Component Value Date    WBC 5.1 05/01/2023    HGB 13.3 05/01/2023    HCT 40.0 05/01/2023    MCV 90.1 05/01/2023     05/01/2023       No results found for: \"TSH\"    Lab Results   Component Value Date    CHOL 188 08/30/2016     Lab Results   Component Value Date    " TRIG 98 08/09/2024     Lab Results   Component Value Date    HDL 56 08/09/2024     Lab Results   Component Value Date    LDLCALC 86 08/09/2024     Lab Results   Component Value Date    HGBA1C 5.8 (H) 08/09/2024       Results for orders placed or performed in visit on 08/09/24   Basic metabolic panel   Result Value Ref Range    Sodium 137 135 - 147 mmol/L    Potassium 4.4 3.5 - 5.3 mmol/L    Chloride 103 96 - 108 mmol/L    CO2 28 21 - 32 mmol/L    ANION GAP 6 4 - 13 mmol/L    BUN 17 5 - 25 mg/dL    Creatinine 0.66 0.60 - 1.30 mg/dL    Glucose, Fasting 89 65 - 99 mg/dL    Calcium 9.3 8.4 - 10.2 mg/dL    eGFR 87 ml/min/1.73sq m   Hemoglobin A1C   Result Value Ref Range    Hemoglobin A1C 5.8 (H) Normal 4.0-5.6%; PreDiabetic 5.7-6.4%; Diabetic >=6.5%; Glycemic control for adults with diabetes <7.0% %     mg/dl   Lipid panel   Result Value Ref Range    Cholesterol 162 See Comment mg/dL    Triglycerides 98 See Comment mg/dL    HDL, Direct 56 >=50 mg/dL    LDL Calculated 86 0 - 100 mg/dL    Non-HDL-Chol (CHOL-HDL) 106 mg/dl       No orders of the defined types were placed in this encounter.      ALLERGIES:  Allergies   Allergen Reactions    Fosamax [Alendronate] Vomiting and Abdominal Pain       Current Medications     Current Outpatient Medications   Medication Sig Dispense Refill    atorvastatin (LIPITOR) 20 mg tablet Take 1 tablet (20 mg total) by mouth daily 90 tablet 1    Calcium Carbonate-Vitamin D (CALCARB 600/D PO) Take 1 tablet by mouth 2 (two) times a day      escitalopram (LEXAPRO) 10 mg tablet Take 0.5 tablets (5 mg total) by mouth daily 45 tablet 1    losartan (COZAAR) 100 MG tablet Take 1 tablet by mouth once daily 90 tablet 0    multivitamin (THERAGRAN) TABS Take 1 tablet by mouth daily      Omega-3 Fatty Acids (fish oil) 1,000 mg Take 1,000 mg by mouth daily      promethazine (PHENERGAN) 25 mg tablet Take 1 tablet (25 mg total) by mouth every 8 (eight) hours as needed for nausea or vomiting 30 tablet  0     No current facility-administered medications for this visit.         Health Maintenance     Health Maintenance   Topic Date Due    Cervical Cancer Screening  Never done    Colorectal Cancer Screening  Never done    RSV Vaccine Age 60+ Years (1 - 1-dose 60+ series) Never done    DXA SCAN  02/07/2022    COVID-19 Vaccine (5 - 2023-24 season) 09/01/2023    Breast Cancer Screening: Mammogram  02/02/2024    Zoster Vaccine (2 of 2) 07/01/2024    Influenza Vaccine (1) 09/01/2024    Fall Risk  05/06/2025    Depression Screening  05/06/2025    Urinary Incontinence Screening  05/06/2025    Medicare Annual Wellness Visit (AWV)  05/06/2025    Hepatitis C Screening  Completed    Osteoporosis Screening  Completed    Pneumococcal Vaccine: 65+ Years  Completed    RSV Vaccine age 0-20 Months  Aged Out    HIB Vaccine  Aged Out    IPV Vaccine  Aged Out    Hepatitis A Vaccine  Aged Out    Meningococcal ACWY Vaccine  Aged Out    HPV Vaccine  Aged Out     Immunization History   Administered Date(s) Administered    COVID-19 MODERNA VACC 0.5 ML IM 02/20/2021, 03/06/2021, 11/08/2021, 07/13/2022    H1N1, All Formulations 10/29/2009    Hep A, adult 05/12/2009    INFLUENZA 12/05/2006    Influenza Split High Dose Preservative Free IM 09/01/2016, 10/05/2017    Influenza, high dose seasonal 0.7 mL 11/27/2018, 10/07/2019, 10/07/2020, 10/05/2021, 12/09/2022, 10/09/2023    Influenza, seasonal, injectable 12/19/2005, 12/10/2007, 11/08/2008, 10/11/2012, 09/26/2014, 09/16/2015    Pneumococcal Conjugate 13-Valent 02/28/2017    Pneumococcal Polysaccharide PPV23 06/22/2018    Tdap 05/12/2009    Tuberculin Skin Test-PPD Intradermal 04/01/1998    Zoster Vaccine Recombinant 05/06/2024          Bere Ag DO

## 2024-08-20 ENCOUNTER — TELEPHONE (OUTPATIENT)
Dept: FAMILY MEDICINE CLINIC | Facility: CLINIC | Age: 74
End: 2024-08-20

## 2024-08-20 NOTE — TELEPHONE ENCOUNTER
----- Message from Frank Pacheco MD sent at 8/19/2024  9:12 AM EDT -----  Blood work looks very acceptable.

## 2024-08-20 NOTE — TELEPHONE ENCOUNTER
I called and left a voicemail. Please let patient know about the message below. Thanks!----- Message from Frank Pacheco MD sent at 8/19/2024  9:12 AM EDT -----  Blood work looks very acceptable.

## 2024-08-21 DIAGNOSIS — E78.5 HYPERLIPIDEMIA, UNSPECIFIED HYPERLIPIDEMIA TYPE: ICD-10-CM

## 2024-08-21 RX ORDER — ATORVASTATIN CALCIUM 20 MG/1
20 TABLET, FILM COATED ORAL DAILY
Qty: 90 TABLET | Refills: 1 | Status: SHIPPED | OUTPATIENT
Start: 2024-08-21

## 2024-08-21 NOTE — TELEPHONE ENCOUNTER
Reason for call:   [x] Refill   [] Prior Auth  [] Other:     Office:   [x] PCP/Provider - Delaney Baca PA-C   [] Specialty/Provider -     Medication: atorvastatin (LIPITOR) 20 mg tablet     Dose/Frequency:     20 mg, Oral, Daily       Quantity: 90    Pharmacy: [id number] - [place] - [address]     Does the patient have enough for 3 days?   [x] Yes   [] No - Send as HP to POD

## 2024-09-17 ENCOUNTER — TELEPHONE (OUTPATIENT)
Dept: OTHER | Facility: HOSPITAL | Age: 74
End: 2024-09-17

## 2024-09-17 ENCOUNTER — IMMUNIZATIONS (OUTPATIENT)
Dept: FAMILY MEDICINE CLINIC | Facility: CLINIC | Age: 74
End: 2024-09-17
Payer: COMMERCIAL

## 2024-09-17 ENCOUNTER — TELEPHONE (OUTPATIENT)
Dept: FAMILY MEDICINE CLINIC | Facility: CLINIC | Age: 74
End: 2024-09-17

## 2024-09-17 DIAGNOSIS — Z23 ENCOUNTER FOR IMMUNIZATION: Primary | ICD-10-CM

## 2024-09-17 DIAGNOSIS — F33.8 SEASONAL AFFECTIVE DISORDER (HCC): ICD-10-CM

## 2024-09-17 PROCEDURE — G0008 ADMIN INFLUENZA VIRUS VAC: HCPCS

## 2024-09-17 PROCEDURE — 90662 IIV NO PRSV INCREASED AG IM: CPT

## 2024-09-17 RX ORDER — ESCITALOPRAM OXALATE 10 MG/1
5 TABLET ORAL DAILY
Qty: 45 TABLET | Refills: 1 | Status: SHIPPED | OUTPATIENT
Start: 2024-09-17 | End: 2024-09-17

## 2024-09-17 RX ORDER — ESCITALOPRAM OXALATE 10 MG/1
10 TABLET ORAL DAILY
Qty: 90 TABLET | Refills: 1 | Status: SHIPPED | OUTPATIENT
Start: 2024-09-17

## 2024-09-17 NOTE — TELEPHONE ENCOUNTER
Patient called the [facility] very frustrated.  Message is being forwarded to the office.     Patient states escitalopram was changed 10 mg daily not 1/2 a tablet and the prescription was sent to the pharmacy incorrectly.     Please contact patient at [phone] when this has been corrected and sent to the pharmacy. Patient only has one pill left

## 2024-09-17 NOTE — TELEPHONE ENCOUNTER
Patient called the [facility] very frustrated.  Message is being forwarded to the office.     Patient states escitalopram was changed 10 mg daily not 1/2 a tablet and the prescription was sent to the pharmacy incorrectly.     Please contact patient at [phone] when this has been corrected and sent to the pharmacy. Patient only has one pill left

## 2024-09-17 NOTE — TELEPHONE ENCOUNTER
Patient's  called in stating they need a new prescription for his wife's Lexapro sent in [facility] [address] because her dose was changed to taking one 10 mg tablet daily instead of half a tablet daily.    Please review and send in new prescription.

## 2024-09-30 DIAGNOSIS — I10 ESSENTIAL HYPERTENSION: ICD-10-CM

## 2024-09-30 RX ORDER — LOSARTAN POTASSIUM 100 MG/1
100 TABLET ORAL DAILY
Qty: 90 TABLET | Refills: 1 | Status: SHIPPED | OUTPATIENT
Start: 2024-09-30

## 2024-11-06 ENCOUNTER — APPOINTMENT (OUTPATIENT)
Dept: LAB | Facility: MEDICAL CENTER | Age: 74
End: 2024-11-06
Payer: COMMERCIAL

## 2024-11-06 ENCOUNTER — OFFICE VISIT (OUTPATIENT)
Dept: FAMILY MEDICINE CLINIC | Facility: CLINIC | Age: 74
End: 2024-11-06
Payer: COMMERCIAL

## 2024-11-06 VITALS
SYSTOLIC BLOOD PRESSURE: 120 MMHG | DIASTOLIC BLOOD PRESSURE: 68 MMHG | TEMPERATURE: 97.7 F | HEART RATE: 72 BPM | OXYGEN SATURATION: 97 % | WEIGHT: 134 LBS | BODY MASS INDEX: 24.51 KG/M2

## 2024-11-06 DIAGNOSIS — T75.3XXA MOTION SICKNESS, INITIAL ENCOUNTER: ICD-10-CM

## 2024-11-06 DIAGNOSIS — I10 ESSENTIAL HYPERTENSION: Primary | ICD-10-CM

## 2024-11-06 DIAGNOSIS — E78.5 HYPERLIPIDEMIA, UNSPECIFIED HYPERLIPIDEMIA TYPE: ICD-10-CM

## 2024-11-06 DIAGNOSIS — E55.9 VITAMIN D DEFICIENCY: ICD-10-CM

## 2024-11-06 DIAGNOSIS — E53.8 VITAMIN B12 DEFICIENCY: ICD-10-CM

## 2024-11-06 DIAGNOSIS — Z12.31 BREAST CANCER SCREENING BY MAMMOGRAM: ICD-10-CM

## 2024-11-06 DIAGNOSIS — L65.9 HAIR THINNING: ICD-10-CM

## 2024-11-06 DIAGNOSIS — Z78.0 POSTMENOPAUSAL: ICD-10-CM

## 2024-11-06 DIAGNOSIS — R73.03 PREDIABETES: ICD-10-CM

## 2024-11-06 DIAGNOSIS — F41.9 ANXIETY: ICD-10-CM

## 2024-11-06 LAB
25(OH)D3 SERPL-MCNC: 35.3 NG/ML (ref 30–100)
ALBUMIN SERPL BCG-MCNC: 4 G/DL (ref 3.5–5)
ALP SERPL-CCNC: 72 U/L (ref 34–104)
ALT SERPL W P-5'-P-CCNC: 14 U/L (ref 7–52)
ANION GAP SERPL CALCULATED.3IONS-SCNC: 6 MMOL/L (ref 4–13)
AST SERPL W P-5'-P-CCNC: 15 U/L (ref 13–39)
BASOPHILS # BLD AUTO: 0.04 THOUSANDS/ÂΜL (ref 0–0.1)
BASOPHILS NFR BLD AUTO: 1 % (ref 0–1)
BILIRUB SERPL-MCNC: 0.33 MG/DL (ref 0.2–1)
BUN SERPL-MCNC: 13 MG/DL (ref 5–25)
CALCIUM SERPL-MCNC: 9.4 MG/DL (ref 8.4–10.2)
CHLORIDE SERPL-SCNC: 103 MMOL/L (ref 96–108)
CO2 SERPL-SCNC: 29 MMOL/L (ref 21–32)
CREAT SERPL-MCNC: 0.55 MG/DL (ref 0.6–1.3)
EOSINOPHIL # BLD AUTO: 0.11 THOUSAND/ÂΜL (ref 0–0.61)
EOSINOPHIL NFR BLD AUTO: 2 % (ref 0–6)
ERYTHROCYTE [DISTWIDTH] IN BLOOD BY AUTOMATED COUNT: 12.5 % (ref 11.6–15.1)
EST. AVERAGE GLUCOSE BLD GHB EST-MCNC: 117 MG/DL
GFR SERPL CREATININE-BSD FRML MDRD: 92 ML/MIN/1.73SQ M
GLUCOSE P FAST SERPL-MCNC: 90 MG/DL (ref 65–99)
HBA1C MFR BLD: 5.7 %
HCT VFR BLD AUTO: 41.2 % (ref 34.8–46.1)
HGB BLD-MCNC: 13.8 G/DL (ref 11.5–15.4)
IMM GRANULOCYTES # BLD AUTO: 0.01 THOUSAND/UL (ref 0–0.2)
IMM GRANULOCYTES NFR BLD AUTO: 0 % (ref 0–2)
LYMPHOCYTES # BLD AUTO: 2.77 THOUSANDS/ÂΜL (ref 0.6–4.47)
LYMPHOCYTES NFR BLD AUTO: 39 % (ref 14–44)
MCH RBC QN AUTO: 29.8 PG (ref 26.8–34.3)
MCHC RBC AUTO-ENTMCNC: 33.5 G/DL (ref 31.4–37.4)
MCV RBC AUTO: 89 FL (ref 82–98)
MONOCYTES # BLD AUTO: 0.49 THOUSAND/ÂΜL (ref 0.17–1.22)
MONOCYTES NFR BLD AUTO: 7 % (ref 4–12)
NEUTROPHILS # BLD AUTO: 3.7 THOUSANDS/ÂΜL (ref 1.85–7.62)
NEUTS SEG NFR BLD AUTO: 51 % (ref 43–75)
NRBC BLD AUTO-RTO: 0 /100 WBCS
PLATELET # BLD AUTO: 321 THOUSANDS/UL (ref 149–390)
PMV BLD AUTO: 10.7 FL (ref 8.9–12.7)
POTASSIUM SERPL-SCNC: 4.2 MMOL/L (ref 3.5–5.3)
PROT SERPL-MCNC: 6.7 G/DL (ref 6.4–8.4)
RBC # BLD AUTO: 4.63 MILLION/UL (ref 3.81–5.12)
SODIUM SERPL-SCNC: 138 MMOL/L (ref 135–147)
TSH SERPL DL<=0.05 MIU/L-ACNC: 1.04 UIU/ML (ref 0.45–4.5)
VIT B12 SERPL-MCNC: 822 PG/ML (ref 180–914)
WBC # BLD AUTO: 7.12 THOUSAND/UL (ref 4.31–10.16)

## 2024-11-06 PROCEDURE — G2211 COMPLEX E/M VISIT ADD ON: HCPCS | Performed by: PHYSICIAN ASSISTANT

## 2024-11-06 PROCEDURE — 83036 HEMOGLOBIN GLYCOSYLATED A1C: CPT

## 2024-11-06 PROCEDURE — 82607 VITAMIN B-12: CPT

## 2024-11-06 PROCEDURE — 84443 ASSAY THYROID STIM HORMONE: CPT

## 2024-11-06 PROCEDURE — 85025 COMPLETE CBC W/AUTO DIFF WBC: CPT

## 2024-11-06 PROCEDURE — 99214 OFFICE O/P EST MOD 30 MIN: CPT | Performed by: PHYSICIAN ASSISTANT

## 2024-11-06 PROCEDURE — 80053 COMPREHEN METABOLIC PANEL: CPT

## 2024-11-06 PROCEDURE — 36415 COLL VENOUS BLD VENIPUNCTURE: CPT

## 2024-11-06 PROCEDURE — 82306 VITAMIN D 25 HYDROXY: CPT

## 2024-11-06 RX ORDER — PROMETHAZINE HYDROCHLORIDE 25 MG/1
25 TABLET ORAL EVERY 8 HOURS PRN
Qty: 30 TABLET | Refills: 0 | Status: SHIPPED | OUTPATIENT
Start: 2024-11-06

## 2024-11-06 NOTE — PROGRESS NOTES
Ambulatory Visit  Name: Antonio Nieves      : 1950      MRN: [id number]  Encounter Provider: Delaney Baca PA-C  Encounter Date: 2024   Encounter department: [facility] PRIMARY CARE    Assessment & Plan  Motion sickness, initial encounter  Given refill on promethazine to have on hand to use if needed for nausea during future trips.  Orders:    promethazine (PHENERGAN) 25 mg tablet; Take 1 tablet (25 mg total) by mouth every 8 (eight) hours as needed for nausea or vomiting    Essential hypertension  Controlled.  Continue losartan 100 mg daily.       Anxiety  Controlled.  Continue Lexapro 10 mg daily.  Orders:    CBC and differential; Future    TSH, 3rd generation with Free T4 reflex; Future    Comprehensive metabolic panel; Future    Hyperlipidemia, unspecified hyperlipidemia type  Controlled with an LDL of 86 in August.  Continue atorvastatin 20 mg daily.       Vitamin B12 deficiency  Recheck B12 level.  Orders:    Vitamin B12; Future    Vitamin D deficiency  Currently on calcium plus vitamin D twice daily.  Recheck vitamin D level.  Orders:    Vitamin D 25 hydroxy; Future    Hair thinning  Check labs as ordered.  Orders:    CBC and differential; Future    TSH, 3rd generation with Free T4 reflex; Future    Comprehensive metabolic panel; Future    Vitamin D 25 hydroxy; Future    Vitamin B12; Future    Breast cancer screening by mammogram    Orders:    Mammo screening bilateral w 3d and cad; Future    Postmenopausal    Orders:    DXA bone density spine hip and pelvis; Future    Prediabetes  Limit carb intake.  Recheck A1c.  Orders:    Hemoglobin A1C; Future       History of Present Illness     Hypertension - on losartan 100 mg - at home runs similar to today     Hyperlipidemia - on atorvastatin 20 mg daily - LDL in 2024 was 86    Anxiety - on Lexapro 10 mg daily - controlled    Osteoporosis - on calcium+D twice daily - vitamin D level in 2023 was 48    Notes that last month she was  sick with stomach issues after trip to [place] - gradually feeling better daily         History obtained from : patient  Review of Systems   Constitutional:  Negative for chills and fever.   Respiratory:  Negative for shortness of breath.    Cardiovascular:  Negative for chest pain, palpitations and leg swelling.   Neurological:  Negative for dizziness and headaches.     Medical History Reviewed by provider this encounter:           Objective     /68 (BP Location: Left arm, Cuff Size: Standard)   Pulse 72   Temp 97.7 °F (36.5 °C) (Tympanic)   Wt 60.8 kg (134 lb)   SpO2 97%   BMI 24.51 kg/m²     Physical Exam  Vitals reviewed.   Constitutional:       General: She is not in acute distress.     Appearance: Normal appearance. She is well-developed and normal weight. She is not ill-appearing.   HENT:      Head: Normocephalic and atraumatic.   Neck:      Thyroid: No thyromegaly.      Vascular: No carotid bruit.   Cardiovascular:      Rate and Rhythm: Normal rate and regular rhythm.      Pulses: Normal pulses.      Heart sounds: Normal heart sounds. No murmur heard.  Pulmonary:      Effort: Pulmonary effort is normal.      Breath sounds: Normal breath sounds. No wheezing, rhonchi or rales.   Musculoskeletal:      Cervical back: Neck supple.      Right lower leg: No edema.      Left lower leg: No edema.   Lymphadenopathy:      Cervical: No cervical adenopathy.   Skin:     General: Skin is warm and dry.   Neurological:      Mental Status: She is alert.   Psychiatric:         Mood and Affect: Mood normal.         Behavior: Behavior normal.         Thought Content: Thought content normal.         Judgment: Judgment normal.

## 2024-11-06 NOTE — ASSESSMENT & PLAN NOTE
Controlled.  Continue Lexapro 10 mg daily.  Orders:    CBC and differential; Future    TSH, 3rd generation with Free T4 reflex; Future    Comprehensive metabolic panel; Future

## 2024-11-06 NOTE — ASSESSMENT & PLAN NOTE
Given refill on promethazine to have on hand to use if needed for nausea during future trips.  Orders:    promethazine (PHENERGAN) 25 mg tablet; Take 1 tablet (25 mg total) by mouth every 8 (eight) hours as needed for nausea or vomiting

## 2024-11-06 NOTE — ASSESSMENT & PLAN NOTE
Currently on calcium plus vitamin D twice daily.  Recheck vitamin D level.  Orders:    Vitamin D 25 hydroxy; Future

## 2024-11-08 ENCOUNTER — TELEPHONE (OUTPATIENT)
Dept: FAMILY MEDICINE CLINIC | Facility: CLINIC | Age: 74
End: 2024-11-08

## 2024-12-12 ENCOUNTER — VBI (OUTPATIENT)
Dept: ADMINISTRATIVE | Facility: OTHER | Age: 74
End: 2024-12-12

## 2024-12-12 NOTE — TELEPHONE ENCOUNTER
12/12/24 12:50 PM     Chart reviewed for [facility]: Colonoscopy was/were not submitted to the patient's insurance.     Christel Barrett MA   [facility]

## 2024-12-31 ENCOUNTER — VBI (OUTPATIENT)
Dept: ADMINISTRATIVE | Facility: OTHER | Age: 74
End: 2024-12-31

## 2024-12-31 NOTE — TELEPHONE ENCOUNTER
12/31/24 8:35 AM     Chart reviewed for [facility]: Colonoscopy was/were not submitted to the patient's insurance.     Christel Barrett MA   [facility]

## 2025-02-13 DIAGNOSIS — E78.5 HYPERLIPIDEMIA, UNSPECIFIED HYPERLIPIDEMIA TYPE: ICD-10-CM

## 2025-02-13 RX ORDER — ATORVASTATIN CALCIUM 20 MG/1
20 TABLET, FILM COATED ORAL DAILY
Qty: 90 TABLET | Refills: 0 | Status: SHIPPED | OUTPATIENT
Start: 2025-02-13

## 2025-03-06 DIAGNOSIS — F33.8 SEASONAL AFFECTIVE DISORDER (HCC): ICD-10-CM

## 2025-03-07 RX ORDER — ESCITALOPRAM OXALATE 10 MG/1
10 TABLET ORAL DAILY
Qty: 90 TABLET | Refills: 1 | Status: SHIPPED | OUTPATIENT
Start: 2025-03-07

## 2025-03-25 DIAGNOSIS — I10 ESSENTIAL HYPERTENSION: ICD-10-CM

## 2025-03-27 RX ORDER — LOSARTAN POTASSIUM 100 MG/1
100 TABLET ORAL DAILY
Qty: 90 TABLET | Refills: 1 | Status: SHIPPED | OUTPATIENT
Start: 2025-03-27

## 2025-05-07 ENCOUNTER — OFFICE VISIT (OUTPATIENT)
Dept: FAMILY MEDICINE CLINIC | Facility: CLINIC | Age: 75
End: 2025-05-07
Payer: COMMERCIAL

## 2025-05-07 VITALS
BODY MASS INDEX: 23.81 KG/M2 | OXYGEN SATURATION: 99 % | HEART RATE: 66 BPM | WEIGHT: 129.4 LBS | SYSTOLIC BLOOD PRESSURE: 124 MMHG | DIASTOLIC BLOOD PRESSURE: 62 MMHG | HEIGHT: 62 IN

## 2025-05-07 DIAGNOSIS — B07.0 PLANTAR WART OF BOTH FEET: ICD-10-CM

## 2025-05-07 DIAGNOSIS — F33.8 SEASONAL AFFECTIVE DISORDER (HCC): ICD-10-CM

## 2025-05-07 DIAGNOSIS — I10 ESSENTIAL HYPERTENSION: ICD-10-CM

## 2025-05-07 DIAGNOSIS — E53.8 VITAMIN B12 DEFICIENCY: ICD-10-CM

## 2025-05-07 DIAGNOSIS — M81.0 OSTEOPOROSIS, UNSPECIFIED OSTEOPOROSIS TYPE, UNSPECIFIED PATHOLOGICAL FRACTURE PRESENCE: ICD-10-CM

## 2025-05-07 DIAGNOSIS — E78.5 HYPERLIPIDEMIA, UNSPECIFIED HYPERLIPIDEMIA TYPE: ICD-10-CM

## 2025-05-07 DIAGNOSIS — M81.0 OSTEOPOROSIS OF LUMBAR SPINE: ICD-10-CM

## 2025-05-07 DIAGNOSIS — F41.9 ANXIETY: ICD-10-CM

## 2025-05-07 DIAGNOSIS — R10.11 RIGHT UPPER QUADRANT ABDOMINAL PAIN: ICD-10-CM

## 2025-05-07 DIAGNOSIS — Z00.00 MEDICARE ANNUAL WELLNESS VISIT, SUBSEQUENT: Primary | ICD-10-CM

## 2025-05-07 DIAGNOSIS — R73.03 PREDIABETES: ICD-10-CM

## 2025-05-07 DIAGNOSIS — E55.9 VITAMIN D DEFICIENCY: ICD-10-CM

## 2025-05-07 PROCEDURE — G2211 COMPLEX E/M VISIT ADD ON: HCPCS | Performed by: PHYSICIAN ASSISTANT

## 2025-05-07 PROCEDURE — G0439 PPPS, SUBSEQ VISIT: HCPCS | Performed by: PHYSICIAN ASSISTANT

## 2025-05-07 PROCEDURE — 99214 OFFICE O/P EST MOD 30 MIN: CPT | Performed by: PHYSICIAN ASSISTANT

## 2025-05-07 RX ORDER — LANOLIN ALCOHOL/MO/W.PET/CERES
1000 CREAM (GRAM) TOPICAL DAILY
COMMUNITY

## 2025-05-07 NOTE — PATIENT INSTRUCTIONS
Please  salicylic acid over the counter (17%). Please apply this nightly the the warts on the bottom of both feet. If not improving, please reach out for referral back to Podiatry/Dr. Cantu.      Medicare Preventive Visit Patient Instructions  Thank you for completing your Welcome to Medicare Visit or Medicare Annual Wellness Visit today. Your next wellness visit will be due in one year (5/8/2026).  The screening/preventive services that you may require over the next 5-10 years are detailed below. Some tests may not apply to you based off risk factors and/or age. Screening tests ordered at today's visit but not completed yet may show as past due. Also, please note that scanned in results may not display below.  Preventive Screenings:  Service Recommendations Previous Testing/Comments   Colorectal Cancer Screening  * Colonoscopy    * Fecal Occult Blood Test (FOBT)/Fecal Immunochemical Test (FIT)  * Fecal DNA/Cologuard Test  * Flexible Sigmoidoscopy Age: 45-75 years old   Colonoscopy: every 10 years (may be performed more frequently if at higher risk)  OR  FOBT/FIT: every 1 year  OR  Cologuard: every 3 years  OR  Sigmoidoscopy: every 5 years  Screening may be recommended earlier than age 45 if at higher risk for colorectal cancer. Also, an individualized decision between you and your healthcare provider will decide whether screening between the ages of 76-85 would be appropriate. Colonoscopy: Not on file  FOBT/FIT: Not on file  Cologuard: Not on file  Sigmoidoscopy: Not on file          Breast Cancer Screening Age: 40+ years old  Frequency: every 1-2 years  Not required if history of left and right mastectomy Mammogram: 02/02/2023    Screening Current   Cervical Cancer Screening Between the ages of 21-29, pap smear recommended once every 3 years.   Between the ages of 30-65, can perform pap smear with HPV co-testing every 5 years.   Recommendations may differ for women with a history of total hysterectomy,  cervical cancer, or abnormal pap smears in past. Pap Smear: Not on file    Screening Not Indicated   Hepatitis C Screening Once for adults born between 1945 and 1965  More frequently in patients at high risk for Hepatitis C Hep C Antibody: 07/09/2018    Screening Current   Diabetes Screening 1-2 times per year if you're at risk for diabetes or have pre-diabetes Fasting glucose: 90 mg/dL (11/6/2024)  A1C: 5.7 % (11/6/2024)  Screening Current   Cholesterol Screening Once every 5 years if you don't have a lipid disorder. May order more often based on risk factors. Lipid panel: 08/09/2024    Screening Not Indicated  History Lipid Disorder     Other Preventive Screenings Covered by Medicare:  Abdominal Aortic Aneurysm (AAA) Screening: covered once if your at risk. You're considered to be at risk if you have a family history of AAA.  Lung Cancer Screening: covers low dose CT scan once per year if you meet all of the following conditions: (1) Age 55-77; (2) No signs or symptoms of lung cancer; (3) Current smoker or have quit smoking within the last 15 years; (4) You have a tobacco smoking history of at least 20 pack years (packs per day multiplied by number of years you smoked); (5) You get a written order from a healthcare provider.  Glaucoma Screening: covered annually if you're considered high risk: (1) You have diabetes OR (2) Family history of glaucoma OR (3)  aged 50 and older OR (4)  American aged 65 and older  Osteoporosis Screening: covered every 2 years if you meet one of the following conditions: (1) You're estrogen deficient and at risk for osteoporosis based off medical history and other findings; (2) Have a vertebral abnormality; (3) On glucocorticoid therapy for more than 3 months; (4) Have primary hyperparathyroidism; (5) On osteoporosis medications and need to assess response to drug therapy.   Last bone density test (DXA Scan): 02/11/2020.  HIV Screening: covered annually if you're  between the age of 15-65. Also covered annually if you are younger than 15 and older than 65 with risk factors for HIV infection. For pregnant patients, it is covered up to 3 times per pregnancy.    Immunizations:  Immunization Recommendations   Influenza Vaccine Annual influenza vaccination during flu season is recommended for all persons aged >= 6 months who do not have contraindications   Pneumococcal Vaccine   * Pneumococcal conjugate vaccine = PCV13 (Prevnar 13), PCV15 (Vaxneuvance), PCV20 (Prevnar 20)  * Pneumococcal polysaccharide vaccine = PPSV23 (Pneumovax) Adults 19-65 yo with certain risk factors or if 65+ yo  If never received any pneumonia vaccine: recommend Prevnar 20 (PCV20)  Give PCV20 if previously received 1 dose of PCV13 or PPSV23   Hepatitis B Vaccine 3 dose series if at intermediate or high risk (ex: diabetes, end stage renal disease, liver disease)   Respiratory syncytial virus (RSV) Vaccine - COVERED BY MEDICARE PART D  * RSVPreF3 (Arexvy) CDC recommends that adults 60 years of age and older may receive a single dose of RSV vaccine using shared clinical decision-making (SCDM)   Tetanus (Td) Vaccine - COST NOT COVERED BY MEDICARE PART B Following completion of primary series, a booster dose should be given every 10 years to maintain immunity against tetanus. Td may also be given as tetanus wound prophylaxis.   Tdap Vaccine - COST NOT COVERED BY MEDICARE PART B Recommended at least once for all adults. For pregnant patients, recommended with each pregnancy.   Shingles Vaccine (Shingrix) - COST NOT COVERED BY MEDICARE PART B  2 shot series recommended in those 19 years and older who have or will have weakened immune systems or those 50 years and older     Health Maintenance Due:      Topic Date Due    DXA SCAN  02/07/2022    Breast Cancer Screening: Mammogram  08/12/2025 (Originally 2/2/2024)    Colorectal Cancer Screening  05/07/2026 (Originally 3/21/1995)    Hepatitis C Screening  Completed      Immunizations Due:  There are no preventive care reminders to display for this patient.  Advance Directives   What are advance directives?  Advance directives are legal documents that state your wishes and plans for medical care. These plans are made ahead of time in case you lose your ability to make decisions for yourself. Advance directives can apply to any medical decision, such as the treatments you want, and if you want to donate organs.   What are the types of advance directives?  There are many types of advance directives, and each state has rules about how to use them. You may choose a combination of any of the following:  Living will:  This is a written record of the treatment you want. You can also choose which treatments you do not want, which to limit, and which to stop at a certain time. This includes surgery, medicine, IV fluid, and tube feedings.   Durable power of  for healthcare (DPAHC):  This is a written record that states who you want to make healthcare choices for you when you are unable to make them for yourself. This person, called a proxy, is usually a family member or a friend. You may choose more than 1 proxy.  Do not resuscitate (DNR) order:  A DNR order is used in case your heart stops beating or you stop breathing. It is a request not to have certain forms of treatment, such as CPR. A DNR order may be included in other types of advance directives.  Medical directive:  This covers the care that you want if you are in a coma, near death, or unable to make decisions for yourself. You can list the treatments you want for each condition. Treatment may include pain medicine, surgery, blood transfusions, dialysis, IV or tube feedings, and a ventilator (breathing machine).  Values history:  This document has questions about your views, beliefs, and how you feel and think about life. This information can help others choose the care that you would choose.  Why are advance directives  important?  An advance directive helps you control your care. Although spoken wishes may be used, it is better to have your wishes written down. Spoken wishes can be misunderstood, or not followed. Treatments may be given even if you do not want them. An advance directive may make it easier for your family to make difficult choices about your care.   Urinary Incontinence   Urinary incontinence (UI)  is when you lose control of your bladder. UI develops because your bladder cannot store or empty urine properly. The 3 most common types of UI are stress incontinence, urge incontinence, or both.  Medicines:   May be given to help strengthen your bladder control. Report any side effects of medication to your healthcare provider.  Do pelvic muscle exercises often:  Your pelvic muscles help you stop urinating. Squeeze these muscles tight for 5 seconds, then relax for 5 seconds. Gradually work up to squeezing for 10 seconds. Do 3 sets of 15 repetitions a day, or as directed. This will help strengthen your pelvic muscles and improve bladder control.  Train your bladder:  Go to the bathroom at set times, such as every 2 hours, even if you do not feel the urge to go. You can also try to hold your urine when you feel the urge to go. For example, hold your urine for 5 minutes when you feel the urge to go. As that becomes easier, hold your urine for 10 minutes.   Self-care:   Keep a UI record.  Write down how often you leak urine and how much you leak. Make a note of what you were doing when you leaked urine.  Drink liquids as directed. You may need to limit the amount of liquid you drink to help control your urine leakage. Do not drink any liquid right before you go to bed. Limit or do not have drinks that contain caffeine or alcohol.   Prevent constipation.  Eat a variety of high-fiber foods. Good examples are high-fiber cereals, beans, vegetables, and whole-grain breads. Walking is the best way to trigger your intestines to  have a bowel movement.  Exercise regularly and maintain a healthy weight.  Weight loss and exercise will decrease pressure on your bladder and help you control your leakage.   Use a catheter as directed  to help empty your bladder. A catheter is a tiny, plastic tube that is put into your bladder to drain your urine.   Go to behavior therapy as directed.  Behavior therapy may be used to help you learn to control your urge to urinate.     © Copyright [employer] 2018 Information is for End User's use only and may not be sold, redistributed or otherwise used for commercial purposes. All illustrations and images included in CareNotes® are the copyrighted property of [employer] or [employer]

## 2025-05-07 NOTE — ASSESSMENT & PLAN NOTE
- Warts are viral in origin.  - Advised to apply over-the-counter salicylic acid to the warts nightly and allow it to dry before covering feet with sheets. This treatment may take several months to completely remove the warts.  - Advised to avoid touching the warts with hands to prevent spreading the virus to other areas of feet or hands.  - If over-the-counter treatment proves ineffective, will contact for a referral back to the podiatrist.  Orders:    CBC and differential; Future

## 2025-05-07 NOTE — PROGRESS NOTES
Name: Antonio Nieves      : 1950      MRN: [id number]  Encounter Provider: Delaney Baca PA-C  Encounter Date: 2025   Encounter department: [facility] PRIMARY CARE    :  Assessment & Plan  Medicare annual wellness visit, subsequent         Essential hypertension  - Blood pressure readings have been satisfactory on losartan 100 mg, with a recent reading of 136 mmHg at home.  - Continues to monitor blood pressure at home.  - Blood pressure reading today was 124/62 mmHg.  - Advised to continue current medication regimen and home monitoring.  Orders:    CBC and differential; Future    Osteoporosis, unspecified osteoporosis type, unspecified pathological fracture presence  Continue Ca+D. Allergic to Fosamax.Declined repeat DEXA.  Orders:    CBC and differential; Future    Plantar wart of both feet  - Warts are viral in origin.  - Advised to apply over-the-counter salicylic acid to the warts nightly and allow it to dry before covering feet with sheets. This treatment may take several months to completely remove the warts.  - Advised to avoid touching the warts with hands to prevent spreading the virus to other areas of feet or hands.  - If over-the-counter treatment proves ineffective, will contact for a referral back to the podiatrist.  Orders:    CBC and differential; Future    Osteoporosis of lumbar spine  Continue Ca+D. Allergic to Fosamax.Declined repeat DEXA.  Orders:    CBC and differential; Future    Anxiety  - Reports doing well on Lexapro, especially during the spring and summer.  - No significant issues during the fall and winter.  - No changes to current medication regimen necessary.  - Continues to manage anxiety effectively with Lexapro.  Orders:    CBC and differential; Future    TSH, 3rd generation with Free T4 reflex; Future    Seasonal affective disorder (HCC)  - Reports doing well on Lexapro, especially during the spring and summer.  - No significant issues during the  fall and winter.  - No changes to current medication regimen necessary.  - Continues to manage anxiety effectively with Lexapro.    Orders:    CBC and differential; Future    Hyperlipidemia, unspecified hyperlipidemia type  Controlled. Continue atorvastatin 20 mg daily.  Orders:    CBC and differential; Future    Lipid Panel with Direct LDL reflex; Future    Vitamin D deficiency  Recheck level. Currently on Ca + D.  Orders:    CBC and differential; Future    Vitamin D 25 hydroxy; Future    Vitamin B12 deficiency  Currently supplementing. Recheck level.  Orders:    CBC and differential; Future    Vitamin B12; Future    Prediabetes  Limit carb intake. Recheck labs prior to next visit.   Orders:    CBC and differential; Future    Comprehensive metabolic panel; Future    Hemoglobin A1C    Right upper quadrant abdominal pain  - The pain is likely due to gas accumulation in the gastrointestinal tract, which resolves upon passing gas. This does not indicate a significant intestinal issue.  - Advised to monitor food portions and consider using over-the-counter Gas-X to manage gas production.  - No significant changes in the pain compared to last year.         Assessment & Plan  1. Abdominal pain.  - The pain is likely due to gas accumulation in the gastrointestinal tract, which resolves upon passing gas. This does not indicate a significant intestinal issue.  - Advised to monitor food portions and consider using over-the-counter Gas-X to manage gas production.  - No significant changes in the pain compared to last year.      2. Hypertension.  - Blood pressure readings have been satisfactory on losartan 100 mg, with a recent reading of 136 mmHg at home.  - Continues to monitor blood pressure at home.  - Blood pressure reading today was 124/62 mmHg.  - Advised to continue current medication regimen and home monitoring.    3. Anxiety.  - Reports doing well on Lexapro, especially during the spring and summer.  - No significant  issues during the fall and winter.  - No changes to current medication regimen necessary.  - Continues to manage anxiety effectively with Lexapro.    4. Health maintenance.  - LDL cholesterol level was 86 mg/dL in August 2024, within the desired range of under 100 mg/dL. All other cholesterol levels were also within normal limits.  - Declined mammogram, colon screening, bone density test, tetanus booster, additional COVID-19 vaccines, and RSV vaccine.  - Lab order placed to update cholesterol levels later in the year.  - A1c will be checked as part of annual blood work.  - Provided with a document to complete regarding living will, advance directive, and durable medical power of .    5. Warts on feet.  - Warts are viral in origin.  - Advised to apply over-the-counter salicylic acid to the warts nightly and allow it to dry before covering feet with sheets. This treatment may take several months to completely remove the warts.  - Advised to avoid touching the warts with hands to prevent spreading the virus to other areas of feet or hands.  - If over-the-counter treatment proves ineffective, will contact for a referral back to the podiatrist.    Follow-up  The patient will follow up in 6 months.         Preventive health issues were discussed with patient, and age appropriate screening tests were ordered as noted in patient's After Visit Summary. Personalized health advice and appropriate referrals for health education or preventive services given if needed, as noted in patient's After Visit Summary.    History of Present Illness         Patient Care Team:  Delaney Baca PA-C as PCP - General (Family Medicine)  Delaney Baca PA-C as PCP - PCP-[facility]  Carlotta Thompson MD as PCP - Endocrinology (Endocrinology)  Hubert Glover MD    History of Present Illness  The patient is a 75-year-old female who presents for her Medicare annual wellness visit.    She reports experiencing  intermittent, non-severe pain in the lower right quadrant of her abdomen, which she attributes to the consumption of gas-inducing foods such as beans or heavy lifting. The pain, described as muscular in nature, typically lasts for approximately 40 seconds and is localized under her ribs. She notes that the pain has not worsened since the previous year and is often relieved by the passage of gas or the intake of water. An ultrasound conducted last year did not reveal any abnormalities.    She monitors her blood pressure at home, with readings generally consistent with those obtained in the clinic. However, she reports a recent reading of 136. She is on losartan 100 mg.    She has been managing her anxiety effectively with Lexapro, noting an improvement during the spring and summer seasons. She also reports doing well during the fall and winter seasons.    She has been adhering to a sugar-free diet and is currently taking calcium, vitamin D, and vitamin B12 supplements. She has discontinued the use of fish oil. She declines the need for a mammogram, colon screening, bone density test, tetanus booster, additional COVID-19 vaccines, and the RSV vaccine. She does not have a living will, advance directive, or durable medical power of . She reports no memory concerns. She also reports no recent fevers, chills, cold symptoms, coughing, wheezing, shortness of breath, chest pains, palpitations, leg swelling, nausea, vomiting, diarrhea, hematochezia, dysuria, polyuria, muscle or joint pains, skin concerns, dizziness, syncope, bleeding or bruising easily. She occasionally experiences mild headaches, which are managed with half a tablet of Tylenol or a walk. She does not experience migraines. She does not require any medication refills at this time.    She has corns on the soles of both feet, which cause discomfort during walking. Despite applying Band-Aids, the corns remain swollen. She consulted a podiatrist two years  ago, who trimmed the corns and applied a solution, but they have since regrown. She was not advised on the use of shoe inserts or different types of footwear.    PAST SURGICAL HISTORY:  Cataracts, needle aspiration of the thyroid, endoscopy (EGD).    SOCIAL HISTORY  She does not smoke or drink alcohol.    FAMILY HISTORY  Her sister had cancer in the upper stomach and passed away. Another sister has breast cancer and lives in [place].        Review of Systems   Constitutional:  Negative for chills and fever.   HENT:  Negative for congestion, rhinorrhea and sore throat.    Respiratory:  Negative for cough, shortness of breath and wheezing.    Cardiovascular:  Negative for chest pain, palpitations and leg swelling.   Gastrointestinal:  Positive for abdominal pain (RUQ briefly and better with passing gas). Negative for blood in stool, constipation, diarrhea, nausea and vomiting.   Genitourinary:  Negative for dysuria and frequency.   Musculoskeletal:  Negative for arthralgias and myalgias.   Skin:  Negative for rash.        Corns on feet   Neurological:  Positive for headaches (very slight - relieved with fresh air). Negative for dizziness and syncope.   Hematological:  Does not bruise/bleed easily.   Psychiatric/Behavioral:  Negative for dysphoric mood. The patient is not nervous/anxious.      Annual Wellness Visit Questionnaire   Antonio is here for her Subsequent Wellness visit.     Health Risk Assessment:   Patient rates overall health as good. Patient feels that their physical health rating is same. Patient is satisfied with their life. Eyesight was rated as slightly better. Hearing was rated as slightly better. Patient feels that their emotional and mental health rating is same. Patients states they are never, rarely angry. Patient states they are sometimes unusually tired/fatigued. Pain experienced in the last 7 days has been none. Patient states that she has experienced no weight loss or gain in last 6 months.      Depression Screening:   PHQ-9 Score: 2      Fall Risk Screening:   In the past year, patient has experienced: no history of falling in past year      Urinary Incontinence Screening:   Patient has not leaked urine accidently in the last six months.     Home Safety:  Patient does not have trouble with stairs inside or outside of their home. Patient has working smoke alarms and has working carbon monoxide detector. Home safety hazards include: none.     Nutrition:   Current diet is Low Cholesterol and Regular.     Medications:   Patient is currently taking over-the-counter supplements. OTC medications include: see medication list. Patient is able to manage medications.     Activities of Daily Living (ADLs)/Instrumental Activities of Daily Living (IADLs):   Walk and transfer into and out of bed and chair?: Yes  Dress and groom yourself?: Yes    Bathe or shower yourself?: Yes    Feed yourself? Yes  Do your laundry/housekeeping?: Yes  Manage your money, pay your bills and track your expenses?: Yes  Make your own meals?: Yes    Do your own shopping?: Yes    Previous Hospitalizations:   Any hospitalizations or ED visits within the last 12 months?: No      Advance Care Planning:   Living will: No    Durable POA for healthcare: No    Advanced directive: No    ACP document given: Yes      Cognitive Screening:   Provider or family/friend/caregiver concerned regarding cognition?: No    Preventive Screenings      Cardiovascular Screening:    General: Screening Not Indicated and History Lipid Disorder      Diabetes Screening:     General: Screening Current      Colorectal Cancer Screening:     General: Patient Declines      Breast Cancer Screening:     General: Patient Declines      Cervical Cancer Screening:    General: Screening Not Indicated      Osteoporosis Screening:    General: Screening Not Indicated, History Osteoporosis and Patient Declines      Abdominal Aortic Aneurysm (AAA) Screening:        General: Screening Not  Indicated      Lung Cancer Screening:     General: Screening Not Indicated      Hepatitis C Screening:    General: Screening Current    Immunizations:  - Immunizations due: COVID, RSV  - The patient declines recommended vaccines currently despite my recommendations      Screening, Brief Intervention, and Referral to Treatment (SBIRT)     Screening  Typical number of drinks in a day: 0  Typical number of drinks in a week: 0  Interpretation: Low risk drinking behavior.    AUDIT-C Screenin) How often did you have a drink containing alcohol in the past year? never  2) How many drinks did you have on a typical day when you were drinking in the past year? 0  3) How often did you have 6 or more drinks on one occasion in the past year? never    AUDIT-C Score: 0  Interpretation: Score 0-2 (female): Negative screen for alcohol misuse    Single Item Drug Screening:  How often have you used an illegal drug (including marijuana) or a prescription medication for non-medical reasons in the past year? never    Single Item Drug Screen Score: 0  Interpretation: Negative screen for possible drug use disorder    Social Drivers of Health     Financial Resource Strain: Low Risk  (2023)    Overall Financial Resource Strain (CARDIA)     Difficulty of Paying Living Expenses: Not hard at all   Food Insecurity: No Food Insecurity (2025)    Hunger Vital Sign     Worried About Running Out of Food in the Last Year: Never true     Ran Out of Food in the Last Year: Never true   Transportation Needs: No Transportation Needs (2025)    PRAPARE - Transportation     Lack of Transportation (Medical): No     Lack of Transportation (Non-Medical): No   Housing Stability: Low Risk  (2025)    Housing Stability Vital Sign     Unable to Pay for Housing in the Last Year: No     Number of Times Moved in the Last Year: 0     Homeless in the Last Year: No   Utilities: Not At Risk (2025)    [facility] Utilities     Threatened with loss of  "utilities: No     No results found.    Objective   /62   Pulse 66   Ht 5' 1.5\" (1.562 m)   Wt 58.7 kg (129 lb 6.4 oz)   SpO2 99%   BMI 24.05 kg/m²       Physical Exam  Vitals reviewed.   Constitutional:       General: She is not in acute distress.     Appearance: Normal appearance. She is well-developed and normal weight. She is not ill-appearing.   HENT:      Head: Normocephalic and atraumatic.      Right Ear: Tympanic membrane, ear canal and external ear normal.      Left Ear: Tympanic membrane, ear canal and external ear normal.      Mouth/Throat:      Mouth: Mucous membranes are moist.      Pharynx: No oropharyngeal exudate or posterior oropharyngeal erythema.   Eyes:      Conjunctiva/sclera: Conjunctivae normal.      Pupils: Pupils are equal, round, and reactive to light.   Neck:      Thyroid: No thyromegaly.      Vascular: No carotid bruit.   Cardiovascular:      Rate and Rhythm: Normal rate and regular rhythm.      Pulses: Normal pulses.      Heart sounds: Normal heart sounds. No murmur heard.  Pulmonary:      Effort: Pulmonary effort is normal.      Breath sounds: Normal breath sounds. No wheezing, rhonchi or rales.   Abdominal:      General: Bowel sounds are normal. There is no distension.      Palpations: Abdomen is soft. There is no mass.      Tenderness: There is no abdominal tenderness. There is no guarding.   Musculoskeletal:      Cervical back: Normal range of motion and neck supple.      Right lower leg: No edema.      Left lower leg: No edema.   Lymphadenopathy:      Cervical: No cervical adenopathy.   Skin:     General: Skin is warm and dry.      Findings: No rash.   Neurological:      Mental Status: She is alert.      Sensory: No sensory deficit.      Motor: No weakness.      Comments: 5/5 strength in UE and LE   Psychiatric:         Mood and Affect: Mood normal.         Behavior: Behavior normal.         Thought Content: Thought content normal.         Judgment: Judgment normal. "

## 2025-05-07 NOTE — ASSESSMENT & PLAN NOTE
Recheck level. Currently on Ca + D.  Orders:    CBC and differential; Future    Vitamin D 25 hydroxy; Future

## 2025-05-07 NOTE — ASSESSMENT & PLAN NOTE
- Reports doing well on Lexapro, especially during the spring and summer.  - No significant issues during the fall and winter.  - No changes to current medication regimen necessary.  - Continues to manage anxiety effectively with Lexapro.    Orders:    CBC and differential; Future

## 2025-05-07 NOTE — ASSESSMENT & PLAN NOTE
Continue Ca+D. Allergic to Fosamax.Declined repeat DEXA.  Orders:    CBC and differential; Future

## 2025-05-07 NOTE — ASSESSMENT & PLAN NOTE
- Blood pressure readings have been satisfactory on losartan 100 mg, with a recent reading of 136 mmHg at home.  - Continues to monitor blood pressure at home.  - Blood pressure reading today was 124/62 mmHg.  - Advised to continue current medication regimen and home monitoring.  Orders:    CBC and differential; Future

## 2025-05-07 NOTE — ASSESSMENT & PLAN NOTE
Currently supplementing. Recheck level.  Orders:    CBC and differential; Future    Vitamin B12; Future

## 2025-05-07 NOTE — ASSESSMENT & PLAN NOTE
- Reports doing well on Lexapro, especially during the spring and summer.  - No significant issues during the fall and winter.  - No changes to current medication regimen necessary.  - Continues to manage anxiety effectively with Lexapro.  Orders:    CBC and differential; Future    TSH, 3rd generation with Free T4 reflex; Future

## 2025-05-07 NOTE — ASSESSMENT & PLAN NOTE
Controlled. Continue atorvastatin 20 mg daily.  Orders:    CBC and differential; Future    Lipid Panel with Direct LDL reflex; Future

## 2025-05-12 DIAGNOSIS — E78.5 HYPERLIPIDEMIA, UNSPECIFIED HYPERLIPIDEMIA TYPE: ICD-10-CM

## 2025-05-12 RX ORDER — ATORVASTATIN CALCIUM 20 MG/1
20 TABLET, FILM COATED ORAL DAILY
Qty: 90 TABLET | Refills: 1 | Status: SHIPPED | OUTPATIENT
Start: 2025-05-12

## 2025-05-13 ENCOUNTER — VBI (OUTPATIENT)
Dept: ADMINISTRATIVE | Facility: OTHER | Age: 75
End: 2025-05-13

## 2025-05-13 NOTE — TELEPHONE ENCOUNTER
05/13/25 12:57 PM     Chart reviewed for CRC: FIT/FOBT (3) was/were not submitted to the patient's insurance.     Christel Barrett MA   [facility]

## 2025-07-19 ENCOUNTER — OFFICE VISIT (OUTPATIENT)
Dept: URGENT CARE | Facility: MEDICAL CENTER | Age: 75
End: 2025-07-19
Payer: COMMERCIAL

## 2025-07-19 VITALS
TEMPERATURE: 98.2 F | SYSTOLIC BLOOD PRESSURE: 138 MMHG | DIASTOLIC BLOOD PRESSURE: 70 MMHG | OXYGEN SATURATION: 99 % | RESPIRATION RATE: 18 BRPM | HEART RATE: 62 BPM

## 2025-07-19 DIAGNOSIS — R05.1 ACUTE COUGH: Primary | ICD-10-CM

## 2025-07-19 PROCEDURE — 99213 OFFICE O/P EST LOW 20 MIN: CPT | Performed by: PHYSICIAN ASSISTANT

## 2025-07-19 RX ORDER — PREDNISONE 10 MG/1
TABLET ORAL
Qty: 18 TABLET | Refills: 0 | Status: SHIPPED | OUTPATIENT
Start: 2025-07-19

## 2025-07-19 NOTE — PROGRESS NOTES
[facility]  Name: Antonio Nieves      : 1950      MRN: [id number]  Encounter Provider: Anastacio Galvez PA-C  Encounter Date: 2025   Encounter department: [facility] [place]  :  Assessment & Plan  Acute cough    Orders:    predniSONE 10 mg tablet; 4 x 3 days, 3 x 1, 2 x 1, 1 x 1        Patient Instructions  Follow up with PCP in 3-5 days as needed.    If tests are performed, our office will contact you with results only if changes need to made to the care plan discussed with you at the visit. [facility]    Chief Complaint:   Chief Complaint   Patient presents with    Cough     Patient c/o a hear dry cough x 2 weeks and yesterday she started have pain with coughing      History of Present Illness   Cough  This is a new problem. The current episode started 1 to 4 weeks ago.         Review of Systems   Respiratory:  Positive for cough.    All other systems reviewed and are negative.    Past Medical History   Past Medical History[1]  Past Surgical History[2]  Family History[3]  she reports that she has never smoked. She has never used smokeless tobacco. She reports that she does not drink alcohol and does not use drugs.  Current Outpatient Medications   Medication Instructions    atorvastatin (LIPITOR) 20 mg, Oral, Daily    Calcium Carbonate-Vitamin D (CALCARB 600/D PO) 1 tablet, 2 times daily    escitalopram (LEXAPRO) 10 mg, Oral, Daily    losartan (COZAAR) 100 mg, Oral, Daily    multivitamin (THERAGRAN) TABS 1 tablet, Daily    predniSONE 10 mg tablet 4 x 3 days, 3 x 1, 2 x 1, 1 x 1    promethazine (PHENERGAN) 25 mg, Oral, Every 8 hours PRN    vitamin B-12 (VITAMIN B-12) 1,000 mcg, Daily   Allergies[4]     Objective   /70   Pulse 62   Temp 98.2 °F (36.8 °C)   Resp 18   SpO2 99%      Physical Exam  Vitals and nursing note reviewed.   Constitutional:       Appearance: Normal appearance. She is normal weight.   HENT:      Right Ear:  "Tympanic membrane, ear canal and external ear normal.      Left Ear: Tympanic membrane, ear canal and external ear normal.      Nose: Nose normal.      Mouth/Throat:      Mouth: Mucous membranes are moist.     Eyes:      Conjunctiva/sclera: Conjunctivae normal.       Cardiovascular:      Rate and Rhythm: Normal rate and regular rhythm.      Pulses: Normal pulses.      Heart sounds: Normal heart sounds.   Pulmonary:      Effort: Pulmonary effort is normal.      Breath sounds: Normal breath sounds.   Lymphadenopathy:      Cervical: No cervical adenopathy.     Neurological:      Mental Status: She is alert.     Psychiatric:         Mood and Affect: Mood normal.         Behavior: Behavior normal.         Portions of the record may have been created with voice recognition software.  Occasional wrong word or \"sound a like\" substitutions may have occurred due to the inherent limitations of voice recognition software.  Read the chart carefully and recognize, using context, where substitutions have occurred.         [1]   Past Medical History:  Diagnosis Date    Anemia     last assessed: 5/27/2016    Anxiety     last assessed: 11/16/2015    Esophageal reflux     resolved: 2/28/2017    External hemorrhoids     last assessed: 11/20/2012    Migraine     resolved after went through menopause   [2]   Past Surgical History:  Procedure Laterality Date    CATARACT EXTRACTION, BILATERAL Bilateral     around March or July 2021    ESOPHAGOGASTRODUODENOSCOPY  2015    diagnostic; neg    FINE NEEDLE ASPIRATION  2013    thyroid nodule-negative for malignancy   [3]   Family History  Problem Relation Name Age of Onset    Diabetes Mother          mellitus    Diabetes Father          mellitus    Cancer Sister          stomach cancer    Arthritis Sister      Kidney disease Sister          kidneys are \"shrinking\" due to lots of protein    Thyroid disease Sister          disorder    Hypertension Sister      Breast cancer Sister Santos     No " Known Problems Sister      No Known Problems Sister      Osteoporosis Sister      No Known Problems Sister      Arthritis Sister      Glaucoma Sister      No Known Problems Daughter      No Known Problems Daughter      No Known Problems Maternal Grandmother      No Known Problems Maternal Grandfather      No Known Problems Paternal Grandmother      No Known Problems Paternal Grandfather      No Known Problems Maternal Aunt      No Known Problems Paternal Aunt      Breast cancer Family niece    [4]   Allergies  Allergen Reactions    Fosamax [Alendronate] Vomiting and Abdominal Pain